# Patient Record
Sex: MALE | Race: WHITE | NOT HISPANIC OR LATINO | Employment: FULL TIME | URBAN - METROPOLITAN AREA
[De-identification: names, ages, dates, MRNs, and addresses within clinical notes are randomized per-mention and may not be internally consistent; named-entity substitution may affect disease eponyms.]

---

## 2017-01-27 ENCOUNTER — TELEPHONE (OUTPATIENT)
Dept: INTERNAL MEDICINE | Facility: CLINIC | Age: 62
End: 2017-01-27

## 2017-01-27 ENCOUNTER — OFFICE VISIT (OUTPATIENT)
Dept: INTERNAL MEDICINE | Facility: CLINIC | Age: 62
End: 2017-01-27

## 2017-01-27 VITALS
WEIGHT: 207 LBS | OXYGEN SATURATION: 98 % | HEART RATE: 80 BPM | SYSTOLIC BLOOD PRESSURE: 126 MMHG | DIASTOLIC BLOOD PRESSURE: 66 MMHG | BODY MASS INDEX: 28.07 KG/M2

## 2017-01-27 DIAGNOSIS — B02.9 HERPES ZOSTER WITHOUT COMPLICATION: Primary | ICD-10-CM

## 2017-01-27 PROCEDURE — 99213 OFFICE O/P EST LOW 20 MIN: CPT | Performed by: INTERNAL MEDICINE

## 2017-01-27 RX ORDER — LIDOCAINE 50 MG/G
2 PATCH TOPICAL EVERY 24 HOURS
Qty: 60 PATCH | Refills: 3 | Status: SHIPPED | OUTPATIENT
Start: 2017-01-27 | End: 2018-01-16

## 2017-01-27 RX ORDER — GABAPENTIN 300 MG/1
300 CAPSULE ORAL 3 TIMES DAILY
Qty: 90 CAPSULE | Refills: 1 | Status: SHIPPED | OUTPATIENT
Start: 2017-01-27 | End: 2018-01-16

## 2017-01-27 RX ORDER — VALACYCLOVIR HYDROCHLORIDE 1 G/1
1000 TABLET, FILM COATED ORAL 3 TIMES DAILY
Qty: 21 TABLET | Refills: 0 | Status: SHIPPED | OUTPATIENT
Start: 2017-01-27 | End: 2018-01-16

## 2017-01-27 NOTE — PROGRESS NOTES
Painful rash  History of Present Illness   Girish Greenberg is a 61 y.o. male presents for acute care. Painful rash. Started approx 1 week ago. No fever.     The following portions of the patient's history were reviewed and updated as appropriate: allergies, current medications, past family history, past medical history, past social history, past surgical history and problem list.  Current Outpatient Prescriptions on File Prior to Visit   Medication Sig Dispense Refill   • albuterol (PROVENTIL HFA;VENTOLIN HFA) 108 (90 BASE) MCG/ACT inhaler Inhale 2 puffs Every 6 (Six) Hours As Needed for shortness of air.     • budesonide-formoterol (SYMBICORT) 160-4.5 MCG/ACT inhaler Inhale 2 puffs Daily.     • thyroid (ARMOUR) 65 MG tablet Take 65 mg by mouth Daily.     • [DISCONTINUED] docusate sodium 100 MG capsule Take 100 mg by mouth 2 (Two) Times a Day As Needed for constipation. 40 capsule 0     No current facility-administered medications on file prior to visit.      Review of Systems   Constitutional: Negative.    HENT: Negative.    Respiratory: Negative.    Cardiovascular: Negative.    Skin: Positive for rash.   Neurological:        Painful neuropathic rash   Hematological: Negative.    Psychiatric/Behavioral: Negative.        Objective   Physical Exam   Constitutional: He is oriented to person, place, and time. He appears well-developed and well-nourished.   HENT:   Head: Normocephalic and atraumatic.   Right Ear: External ear normal.   Left Ear: External ear normal.   Nose: Nose normal.   Mouth/Throat: Oropharynx is clear and moist.   Eyes: Conjunctivae and EOM are normal. Pupils are equal, round, and reactive to light.   Cardiovascular: Normal rate, regular rhythm, normal heart sounds and intact distal pulses.    Pulmonary/Chest: Effort normal and breath sounds normal.   Abdominal: Soft. Bowel sounds are normal.   Musculoskeletal: Normal range of motion.   Neurological: He is alert and oriented to person, place, and time.    Skin: Rash noted.   Psychiatric: He has a normal mood and affect. His behavior is normal. Judgment and thought content normal.   Nursing note and vitals reviewed.       Visit Vitals   • /66   • Pulse 80   • Wt 207 lb (93.9 kg)   • SpO2 98%   • BMI 28.07 kg/m2       Assessment/Plan   Diagnoses and all orders for this visit:    Herpes zoster without complication    Other orders  -     valACYclovir (VALTREX) 1000 MG tablet; Take 1 tablet by mouth 3 (Three) Times a Day.  -     lidocaine (LIDODERM) 5 %; Place 2 patches on the skin Daily. Remove & Discard patch within 12 hours or as directed by MD    Patient w/ acute pain. Will try a lidoderm patch. Valtrex tid. If this does not improve symptoms will try gabapentin.

## 2017-01-27 NOTE — TELEPHONE ENCOUNTER
Pt needs a PA for lidocaine patches. i will get an answer before Monday. Do you want to change it?

## 2017-01-27 NOTE — MR AVS SNAPSHOT
Girish Greenberg   1/27/2017 9:00 AM   Office Visit    Dept Phone:  [phone]   Encounter #:  [id number]    Provider:  Regina Landis MD   Department:  [facility] INTERNAL MEDICINE                Your Full Care Plan              Today's Medication Changes          These changes are accurate as of: 1/27/17 10:13 AM.  If you have any questions, ask your nurse or doctor.               New Medication(s)Ordered:     lidocaine 5 %   Commonly known as:  LIDODERM   Place 2 patches on the skin Daily. Remove & Discard patch within 12 hours or as directed by MD       valACYclovir 1000 MG tablet   Commonly known as:  VALTREX   Take 1 tablet by mouth 3 (Three) Times a Day.         Stop taking medication(s)listed here:      MG capsule                Where to Get Your Medications      These medications were sent to [address] [address] [facility] [phone] [facility] [phone]   [address] [facility] [address]     Phone:  [phone]     lidocaine 5 %    valACYclovir 1000 MG tablet                  Your Updated Medication List          This list is accurate as of: 1/27/17 10:13 AM.  Always use your most recent med list.                albuterol 108 (90 BASE) MCG/ACT inhaler   Commonly known as:  PROVENTIL HFA;VENTOLIN HFA       budesonide-formoterol 160-4.5 MCG/ACT inhaler   Commonly known as:  SYMBICORT       lidocaine 5 %   Commonly known as:  LIDODERM   Place 2 patches on the skin Daily. Remove & Discard patch within 12 hours or as directed by MD       Thyroid 65 MG tablet   Commonly known as:  ARMOUR       valACYclovir 1000 MG tablet   Commonly known as:  VALTREX   Take 1 tablet by mouth 3 (Three) Times a Day.               You Were Diagnosed With        Codes Comments    Herpes zoster without complication    -  Primary ICD-10-CM: B02.9  ICD-9-CM: 053.9       Instructions     None    Patient Instructions History      Upcoming  "Appointments     Visit Type Date Time Department    OFFICE VISIT 2017  9:00 AM KELSEY BAKER JODYON      [employer] Signup     [facility] allows you to send messages to your doctor, view your test results, renew your prescriptions, schedule appointments, and more. To sign up, go to [email or web address] and click on the Sign Up Now link in the New User? box. Enter your [employer] Activation Code exactly as it appears below along with the last four digits of your Social Security Number and your Date of Birth () to complete the sign-up process. If you do not sign up before the expiration date, you must request a new code.    Bevii Activation Code: [email or web address]  Expires: 2/10/2017 10:13 AM    If you have questions, you can email [email or web address] or call [phone] to talk to our [employer] staff. Remember, [employer] is NOT to be used for urgent needs. For medical emergencies, dial 911.               Other Info from Your Visit           Allergies     Penicillins  Other (See Comments)    \"BLACKS OUT\"/ IN CHILDHOOD      Reason for Visit     Herpes Zoster           Vital Signs     Blood Pressure Pulse Weight Oxygen Saturation Body Mass Index Smoking Status    126/66 80 207 lb (93.9 kg) 98% 28.07 kg/m2 Never Smoker      Problems and Diagnoses Noted     Herpes zoster without complication        "

## 2017-05-01 ENCOUNTER — TELEPHONE (OUTPATIENT)
Dept: INTERNAL MEDICINE | Facility: CLINIC | Age: 62
End: 2017-05-01

## 2017-05-09 ENCOUNTER — OFFICE VISIT (OUTPATIENT)
Dept: INTERNAL MEDICINE | Facility: CLINIC | Age: 62
End: 2017-05-09

## 2017-05-09 VITALS
WEIGHT: 212 LBS | DIASTOLIC BLOOD PRESSURE: 62 MMHG | HEART RATE: 74 BPM | SYSTOLIC BLOOD PRESSURE: 110 MMHG | BODY MASS INDEX: 28.75 KG/M2 | OXYGEN SATURATION: 98 %
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
## 2017-05-09 DIAGNOSIS — M54.2 NECK PAIN: ICD-10-CM

## 2017-05-09 DIAGNOSIS — G44.52 NEW DAILY PERSISTENT HEADACHE: ICD-10-CM

## 2017-05-09 DIAGNOSIS — M25.842 CYST OF JOINT OF HAND, LEFT: ICD-10-CM

## 2017-05-09 PROCEDURE — 99214 OFFICE O/P EST MOD 30 MIN: CPT | Performed by: INTERNAL MEDICINE

## 2017-05-09 RX ORDER — MELOXICAM 15 MG/1
15 TABLET ORAL DAILY
Qty: 30 TABLET | Refills: 2 | Status: SHIPPED | OUTPATIENT
Start: 2017-05-09 | End: 2018-01-16

## 2017-05-09 RX ORDER — METAXALONE 800 MG/1
800 TABLET ORAL 3 TIMES DAILY PRN
Qty: 21 TABLET | Refills: 2 | OUTPATIENT
Start: 2017-05-09 | End: 2018-02-02

## 2017-05-09 RX ORDER — METHYLPREDNISOLONE 4 MG/1
TABLET ORAL
Qty: 21 TABLET | Refills: 0 | Status: SHIPPED | OUTPATIENT
Start: 2017-05-09 | End: 2018-01-16

## 2017-05-10 ENCOUNTER — APPOINTMENT (OUTPATIENT)
Dept: CT IMAGING | Facility: HOSPITAL | Age: 62
End: 2017-05-10

## 2017-05-24 ENCOUNTER — TREATMENT (OUTPATIENT)
Dept: PHYSICAL THERAPY | Facility: CLINIC | Age: 62
End: 2017-05-24

## 2017-05-24 ENCOUNTER — HOSPITAL ENCOUNTER (OUTPATIENT)
Dept: CT IMAGING | Facility: HOSPITAL | Age: 62
Discharge: HOME | End: 2017-05-24
Admitting: INTERNAL MEDICINE
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
## 2017-05-24 DIAGNOSIS — S46.911D SHOULDER STRAIN, RIGHT, SUBSEQUENT ENCOUNTER: ICD-10-CM
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
## 2017-05-24 DIAGNOSIS — S16.1XXD CERVICAL MUSCLE STRAIN, SUBSEQUENT ENCOUNTER: ICD-10-CM

## 2017-05-24 DIAGNOSIS — G44.52 NEW DAILY PERSISTENT HEADACHE: ICD-10-CM

## 2017-05-24 PROCEDURE — 97161 PT EVAL LOW COMPLEX 20 MIN: CPT | Performed by: PHYSICAL THERAPIST

## 2017-05-24 PROCEDURE — 97110 THERAPEUTIC EXERCISES: CPT | Performed by: PHYSICAL THERAPIST

## 2017-05-24 PROCEDURE — 70450 CT HEAD/BRAIN W/O DYE: CPT

## 2017-05-24 PROCEDURE — 97140 MANUAL THERAPY 1/> REGIONS: CPT | Performed by: PHYSICAL THERAPIST

## 2017-05-31 ENCOUNTER — TREATMENT (OUTPATIENT)
Dept: PHYSICAL THERAPY | Facility: CLINIC | Age: 62
End: 2017-05-31

## 2017-05-31 DIAGNOSIS — S46.911D SHOULDER STRAIN, RIGHT, SUBSEQUENT ENCOUNTER: ICD-10-CM
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
## 2017-05-31 DIAGNOSIS — S16.1XXD CERVICAL MUSCLE STRAIN, SUBSEQUENT ENCOUNTER: ICD-10-CM

## 2017-05-31 PROCEDURE — 97140 MANUAL THERAPY 1/> REGIONS: CPT | Performed by: PHYSICAL THERAPIST

## 2017-05-31 PROCEDURE — 97110 THERAPEUTIC EXERCISES: CPT | Performed by: PHYSICAL THERAPIST

## 2017-06-01 NOTE — PROGRESS NOTES
Physical Therapy Daily Progress Note        Subjective     Girish Greenberg reports: My neck and shoulder felt better for a few hours after IE. Still have headache daily. Right shoulder still moving better.  Objective   See Exercise, Manual, and Modality Logs for complete treatment.       Assessment/Plan  Decreased headache and neck pain after treatment. Improved right shoulder AROM but sore with ball flex up wall. Reinforced importance of good posture. Pt stated heat made headache worse.    Progress per Plan of Care  Add UBE next visit         Manual Therapy:  25       mins  06068;  Therapeutic Exercise: 15      mins  43316;     Neuromuscular Rito:       mins  86461;    Therapeutic Activity:         mins  06636;     Gait Training:         mins  62893;     Ultrasound:         mins  41407;    Electrical Stimulation:        mins  32100 ( );  Dry Needling         mins self-pay    Timed Treatment:   40   mins   Total Treatment:     55   mins    Rhiannon King, PT  Physical Therapist  [place] License # [id number]

## 2017-06-02 ENCOUNTER — TREATMENT (OUTPATIENT)
Dept: PHYSICAL THERAPY | Facility: CLINIC | Age: 62
End: 2017-06-02
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
## 2017-06-02 DIAGNOSIS — S16.1XXD CERVICAL MUSCLE STRAIN, SUBSEQUENT ENCOUNTER: ICD-10-CM

## 2017-06-02 DIAGNOSIS — S46.911D SHOULDER STRAIN, RIGHT, SUBSEQUENT ENCOUNTER: ICD-10-CM

## 2017-06-02 PROCEDURE — 97140 MANUAL THERAPY 1/> REGIONS: CPT | Performed by: PHYSICAL THERAPIST

## 2017-06-02 PROCEDURE — 97110 THERAPEUTIC EXERCISES: CPT | Performed by: PHYSICAL THERAPIST

## 2017-06-02 NOTE — PROGRESS NOTES
Physical Therapy Daily Progress Note        Subjective     Girish Greenberg reports: Have a headache again. 2-3 hours relief after last treatment. Less dizziness and right shoulder pain less    Objective   See Exercise, Manual, and Modality Logs for complete treatment.       Assessment/Plan  Fatigued quickly with Tband ER on the right. Headache less after manual treatment    Progress per Plan of Care           Manual Therapy:  35       mins  04423;  Therapeutic Exercise: 20      mins  23691;     Neuromuscular Rito:       mins  23350;    Therapeutic Activity:         mins  20707;     Gait Training:         mins  91122;     Ultrasound:         mins  86849;    Electrical Stimulation:        mins  89943 ( );  Dry Needling         mins self-pay    Timed Treatment:   55   mins   Total Treatment:     60   mins    Rhiannon King, PT  Physical Therapist  [place] License # [id number]

## 2017-06-05 ENCOUNTER — TREATMENT (OUTPATIENT)
Dept: PHYSICAL THERAPY | Facility: CLINIC | Age: 62
End: 2017-06-05

## 2017-06-05 DIAGNOSIS — S16.1XXD CERVICAL MUSCLE STRAIN, SUBSEQUENT ENCOUNTER: ICD-10-CM

## 2017-06-05 DIAGNOSIS — S46.911D SHOULDER STRAIN, RIGHT, SUBSEQUENT ENCOUNTER: ICD-10-CM
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
## 2017-06-05 PROCEDURE — 97110 THERAPEUTIC EXERCISES: CPT | Performed by: PHYSICAL THERAPIST

## 2017-06-05 PROCEDURE — 97140 MANUAL THERAPY 1/> REGIONS: CPT | Performed by: PHYSICAL THERAPIST

## 2017-06-05 NOTE — PROGRESS NOTES
Physical Therapy Daily Progress Note        Subjective     Girish Greenberg reports: Feeling some better. Longer periods of relief. Headaches a bit less. Shoulder moving and feeling better. Exercises helping.    Objective   See Exercise, Manual, and Modality Logs for complete treatment.       Assessment/Plan  Pt had increased headache after initial manual and exercise treatment but decreased once I did suboccipital release after there ex.    Progress per Plan of Care           Manual Therapy:  25       mins  79878;  Therapeutic Exercise: 20      mins  66180;     Neuromuscular Rito:       mins  89507;    Therapeutic Activity:         mins  78172;     Gait Training:         mins  57566;     Ultrasound:         mins  49128;    Electrical Stimulation:        mins  03146 ( );  Dry Needling         mins self-pay    Timed Treatment:   45   mins   Total Treatment:     50   mins    Rhiannon King, PT  Physical Therapist  [place] License # [id number]

## 2017-06-07 ENCOUNTER — TREATMENT (OUTPATIENT)
Dept: PHYSICAL THERAPY | Facility: CLINIC | Age: 62
End: 2017-06-07
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
## 2017-06-07 DIAGNOSIS — S46.911D SHOULDER STRAIN, RIGHT, SUBSEQUENT ENCOUNTER: ICD-10-CM

## 2017-06-07 DIAGNOSIS — S16.1XXD CERVICAL MUSCLE STRAIN, SUBSEQUENT ENCOUNTER: ICD-10-CM

## 2017-06-07 PROCEDURE — 97140 MANUAL THERAPY 1/> REGIONS: CPT | Performed by: PHYSICAL THERAPIST

## 2017-06-07 PROCEDURE — 97110 THERAPEUTIC EXERCISES: CPT | Performed by: PHYSICAL THERAPIST

## 2017-06-07 NOTE — PROGRESS NOTES
Physical Therapy Daily Progress Note        Subjective     Girish Greenberg reports: Dull headache but neck pain more central and shoulder feeling better.    Objective   See Exercise, Manual, and Modality Logs for complete treatment.       Assessment/Plan  Doing well with improved segmental mobility Cspine. Still sore endrange shoulder flexion. Headache better after treatment. Need to continue to work on upper cervical mob and STM. Ball on wall more painful with right rotation    Progress per Plan of Care           Manual Therapy:  25       mins  38631;  Therapeutic Exercise: 20      mins  01442;     Neuromuscular Rito:       mins  21474;    Therapeutic Activity:         mins  43211;     Gait Training:         mins  54588;     Ultrasound:         mins  13461;    Electrical Stimulation:        mins  92782 ( );  Dry Needling         mins self-pay    Timed Treatment:   45   mins   Total Treatment:     50   mins    Rhiannon King, PT  Physical Therapist  [place] License # [id number]

## 2017-06-12 ENCOUNTER — TREATMENT (OUTPATIENT)
Dept: PHYSICAL THERAPY | Facility: CLINIC | Age: 62
End: 2017-06-12

## 2017-06-12 DIAGNOSIS — S16.1XXD CERVICAL MUSCLE STRAIN, SUBSEQUENT ENCOUNTER: ICD-10-CM

## 2017-06-12 DIAGNOSIS — S46.911D SHOULDER STRAIN, RIGHT, SUBSEQUENT ENCOUNTER: ICD-10-CM
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
## 2017-06-12 PROCEDURE — 97140 MANUAL THERAPY 1/> REGIONS: CPT | Performed by: PHYSICAL THERAPIST

## 2017-06-12 PROCEDURE — 97110 THERAPEUTIC EXERCISES: CPT | Performed by: PHYSICAL THERAPIST

## 2017-06-12 NOTE — PROGRESS NOTES
Physical Therapy Daily Progress Note        Subjective     Girish Greenberg reports: I am waking up without headaches which is a plus. Neck a little sore 4/10 but headaches come on as day goes on. Shoulder better with increased reaching ability    Objective   See Exercise, Manual, and Modality Logs for complete treatment.       Assessment/Plan  Neck got sore after ball on wall rotations, decreased pain after manual and suboccipital release    Progress per Plan of Care   Add prone T, rows, mirror slides next visit           Manual Therapy:  25       mins  16972;  Therapeutic Exercise: 20      mins  96095;     Neuromuscular Rito:       mins  97260;    Therapeutic Activity:         mins  09328;     Gait Training:         mins  01554;     Ultrasound:         mins  11288;    Electrical Stimulation:        mins  91426 ( );  Dry Needling         mins self-pay    Timed Treatment:   45   mins   Total Treatment:     50   mins    Rhiannon King, PT  Physical Therapist  [place] License # [id number]

## 2017-06-15 ENCOUNTER — TREATMENT (OUTPATIENT)
Dept: PHYSICAL THERAPY | Facility: CLINIC | Age: 62
End: 2017-06-15

## 2017-06-15 DIAGNOSIS — S16.1XXD CERVICAL MUSCLE STRAIN, SUBSEQUENT ENCOUNTER: ICD-10-CM
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
## 2017-06-15 DIAGNOSIS — S46.911D SHOULDER STRAIN, RIGHT, SUBSEQUENT ENCOUNTER: ICD-10-CM

## 2017-06-15 PROCEDURE — 97110 THERAPEUTIC EXERCISES: CPT | Performed by: PHYSICAL THERAPIST

## 2017-06-15 PROCEDURE — 97140 MANUAL THERAPY 1/> REGIONS: CPT | Performed by: PHYSICAL THERAPIST

## 2017-06-15 NOTE — PROGRESS NOTES
Physical Therapy Daily Progress Note        Subjective     Girish Greenberg reports: Felt good Monday and Tuesday most of the day then got a headache. Overall less intense and frequent headaches    Objective   See Exercise, Manual, and Modality Logs for complete treatment.       Assessment/Plan  Full cervical PROM after treatment without pain. Headache mildly improved.Limited exercise secondary to pt arriving late    Progress per Plan of Care           Manual Therapy:  20        mins  82319;  Therapeutic Exercise: 12      mins  61622;     Neuromuscular Rito:       mins  97692;    Therapeutic Activity:         mins  61404;     Gait Training:         mins  39362;     Ultrasound:         mins  27014;    Electrical Stimulation:        mins  19505 ( );  Dry Needling         mins self-pay    Timed Treatment:   32   mins   Total Treatment:     35   mins    Rhiannon King, PT  Physical Therapist  [place] License # [id number]

## 2017-06-19 ENCOUNTER — TREATMENT (OUTPATIENT)
Dept: PHYSICAL THERAPY | Facility: CLINIC | Age: 62
End: 2017-06-19

## 2017-06-19 DIAGNOSIS — S16.1XXD CERVICAL MUSCLE STRAIN, SUBSEQUENT ENCOUNTER: ICD-10-CM

## 2017-06-19 DIAGNOSIS — S46.911D SHOULDER STRAIN, RIGHT, SUBSEQUENT ENCOUNTER: ICD-10-CM
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
## 2017-06-19 PROCEDURE — 97140 MANUAL THERAPY 1/> REGIONS: CPT | Performed by: PHYSICAL THERAPIST

## 2017-06-19 PROCEDURE — 97110 THERAPEUTIC EXERCISES: CPT | Performed by: PHYSICAL THERAPIST

## 2017-06-19 NOTE — PROGRESS NOTES
Physical Therapy Daily Progress Note        Subjective     Girish Greenberg reports: Headache better for 2 days after last treatment. Overall I would be good if i could just get rid of headaches. My neck is sore but I could live with that. Shoulder doing much better    Objective   See Exercise, Manual, and Modality Logs for complete treatment.       Assessment/Plan  Still some segmental restrictions left cspine as well as tightness suboccipitals. Chung Hammonds performed dry needling today as noted in treatment log to see if it helps headaches. Fatigued with 5# shrugs added today    Progress per Plan of Care           Manual Therapy:  20       mins  11094;  Therapeutic Exercise: 25      mins  05659;     Neuromuscular Rito:       mins  79614;    Therapeutic Activity:         mins  81682;     Gait Training:         mins  78049;     Ultrasound:         mins  53681;    Electrical Stimulation:        mins  81671 ( );  Dry Needling         mins self-pay    Timed Treatment:   45   mins   Total Treatment:     50   mins    Rhiannon King, PT  Physical Therapist  [place] License # [id number]

## 2017-06-21 ENCOUNTER — TREATMENT (OUTPATIENT)
Dept: PHYSICAL THERAPY | Facility: CLINIC | Age: 62
End: 2017-06-21

## 2017-06-21 DIAGNOSIS — S46.911D SHOULDER STRAIN, RIGHT, SUBSEQUENT ENCOUNTER: ICD-10-CM

## 2017-06-21 DIAGNOSIS — S16.1XXD CERVICAL MUSCLE STRAIN, SUBSEQUENT ENCOUNTER: ICD-10-CM
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
## 2017-06-21 PROCEDURE — 97140 MANUAL THERAPY 1/> REGIONS: CPT | Performed by: PHYSICAL THERAPIST

## 2017-06-21 PROCEDURE — 97110 THERAPEUTIC EXERCISES: CPT | Performed by: PHYSICAL THERAPIST

## 2017-06-21 NOTE — PROGRESS NOTES
Physical Therapy Daily Progress Note        Subjective     Girish Greenberg reports: Headache last night and this morning. I don't think dry needling helped. The manual therapy works better. Pleased with right shoulder    Objective   See Exercise, Manual, and Modality Logs for complete treatment.       Assessment/Plan  Sore right lower Cspine and fatigued right shoulder after yellow medicine ball supine overhead. Headache slightly better immediately after OA release    Progress per Plan of Care           Manual Therapy:  20       mins  63758;  Therapeutic Exercise: 25      mins  71061;     Neuromuscular Rito:       mins  96195;    Therapeutic Activity:         mins  04159;     Gait Training:         mins  74319;     Ultrasound:         mins  13573;    Electrical Stimulation:        mins  80998 ( );  Dry Needling         mins self-pay    Timed Treatment:   40   mins   Total Treatment:     45   mins    Rhiannon King PT  Physical Therapist  [place] License # [id number]

## 2017-06-26 ENCOUNTER — TREATMENT (OUTPATIENT)
Dept: PHYSICAL THERAPY | Facility: CLINIC | Age: 62
End: 2017-06-26
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
## 2017-06-26 DIAGNOSIS — S46.911D SHOULDER STRAIN, RIGHT, SUBSEQUENT ENCOUNTER: ICD-10-CM

## 2017-06-26 DIAGNOSIS — S16.1XXD CERVICAL MUSCLE STRAIN, SUBSEQUENT ENCOUNTER: ICD-10-CM

## 2017-06-26 PROCEDURE — 97140 MANUAL THERAPY 1/> REGIONS: CPT | Performed by: PHYSICAL THERAPIST

## 2017-06-26 PROCEDURE — 97110 THERAPEUTIC EXERCISES: CPT | Performed by: PHYSICAL THERAPIST
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
## 2017-06-28 ENCOUNTER — TREATMENT (OUTPATIENT)
Dept: PHYSICAL THERAPY | Facility: CLINIC | Age: 62
End: 2017-06-28

## 2017-06-28 DIAGNOSIS — S46.911D SHOULDER STRAIN, RIGHT, SUBSEQUENT ENCOUNTER: ICD-10-CM

## 2017-06-28 DIAGNOSIS — S16.1XXD CERVICAL MUSCLE STRAIN, SUBSEQUENT ENCOUNTER: ICD-10-CM
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
## 2017-06-28 PROCEDURE — 97140 MANUAL THERAPY 1/> REGIONS: CPT | Performed by: PHYSICAL THERAPIST

## 2017-06-28 NOTE — PROGRESS NOTES
Physical Therapy Daily Progress Note        Subjective     Girish Greenberg reports: Headache today about 6/10. It has been bad past few days but I am also working harder.    Objective   Tender L OA  L C3-4, C4-5  R T1-2 and upper trap  See Exercise, Manual, and Modality Logs for complete treatment.       Assessment/Plan  No exercisee today due to headache. Ice initially made it worse but then relieved headache. Instructed pt to use ice after work, kevin since he doesn't like to take NSAIDs/meds.     Progress per Plan of Care           Manual Therapy:  35       mins  17887;  Therapeutic Exercise:       mins  99343;     Neuromuscular Rito:       mins  05310;    Therapeutic Activity:         mins  41420;     Gait Training:         mins  29211;     Ultrasound:         mins  85147;    Electrical Stimulation:        mins  14514 ( );  Dry Needling         mins self-pay    Timed Treatment:   35   mins   Total Treatment:     50   mins    Rhiannon King, PT  Physical Therapist  [place] License # [id number]

## 2017-07-05 ENCOUNTER — TREATMENT (OUTPATIENT)
Dept: PHYSICAL THERAPY | Facility: CLINIC | Age: 62
End: 2017-07-05
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
## 2017-07-05 DIAGNOSIS — S16.1XXD CERVICAL MUSCLE STRAIN, SUBSEQUENT ENCOUNTER: ICD-10-CM

## 2017-07-05 PROCEDURE — 97110 THERAPEUTIC EXERCISES: CPT | Performed by: PHYSICAL THERAPIST

## 2017-07-05 PROCEDURE — 97140 MANUAL THERAPY 1/> REGIONS: CPT | Performed by: PHYSICAL THERAPIST

## 2017-07-05 NOTE — PROGRESS NOTES
Physical Therapy Daily Progress Note        Subjective     Girish Greenberg reports: I am better but feel like I have hit a plateau. Still having headaches, not as intense but still won't go away. My right shoulder blade feels very stiff.    Objective   See Exercise, Manual, and Modality Logs for complete treatment.       Assessment/Plan  Much improved symptoms with less headache after manual treatment. Especially restricted at L TMJ and temple. Left C1 stuck left    Progress per Plan of Care           Manual Therapy:  40       mins  59277;  Therapeutic Exercise: 10    mins  59704;     Neuromuscular Rito:       mins  98717;    Therapeutic Activity:         mins  06970;     Gait Training:         mins  28709;     Ultrasound:         mins  77740;    Electrical Stimulation:        mins  65123 ( );  Dry Needling         mins self-pay    Timed Treatment:  50 mins   Total Treatment:     52   mins    Rhiannon King PT  Physical Therapist  [place] License # [id number]

## 2017-07-07 ENCOUNTER — TREATMENT (OUTPATIENT)
Dept: PHYSICAL THERAPY | Facility: CLINIC | Age: 62
End: 2017-07-07

## 2017-07-07 DIAGNOSIS — S16.1XXD CERVICAL MUSCLE STRAIN, SUBSEQUENT ENCOUNTER: ICD-10-CM

## 2017-07-07 DIAGNOSIS — S46.911D SHOULDER STRAIN, RIGHT, SUBSEQUENT ENCOUNTER: ICD-10-CM
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
## 2017-07-07 PROCEDURE — 97140 MANUAL THERAPY 1/> REGIONS: CPT | Performed by: PHYSICAL THERAPIST

## 2017-07-07 PROCEDURE — 97110 THERAPEUTIC EXERCISES: CPT | Performed by: PHYSICAL THERAPIST

## 2017-07-07 NOTE — PROGRESS NOTES
Physical Therapy Daily Progress Note        Subjective     Girish Greenberg reports: Sore in left jaw and neck after last tretament. Headache was better Wednesday but woke up at 3 am last night and had to take medicine    Objective   Pain R T1-2 with both left and right rotation.   No pain with flexion    See Exercise, Manual, and Modality Logs for complete treatment.       Assessment/Plan  No pain with B rotation after manual treatment/MWM to T1-2. HEadache less as well after manual treatment.     Progress per Plan of Care           Manual Therapy:  30       mins  43721;  Therapeutic Exercise: 10      mins  82340;     Neuromuscular Rito:       mins  46356;    Therapeutic Activity:         mins  23994;     Gait Training:         mins  49500;     Ultrasound:         mins  57886;    Electrical Stimulation:        mins  47395 ( );  Dry Needling         mins self-pay    Timed Treatment:   40   mins   Total Treatment:     45   mins    Rhiannon King, PT  Physical Therapist  [place] License # [id number]

## 2017-07-10 ENCOUNTER — TREATMENT (OUTPATIENT)
Dept: PHYSICAL THERAPY | Facility: CLINIC | Age: 62
End: 2017-07-10

## 2017-07-10 DIAGNOSIS — S46.911D SHOULDER STRAIN, RIGHT, SUBSEQUENT ENCOUNTER: ICD-10-CM

## 2017-07-10 DIAGNOSIS — S16.1XXD CERVICAL MUSCLE STRAIN, SUBSEQUENT ENCOUNTER: ICD-10-CM
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
## 2017-07-10 PROCEDURE — 97140 MANUAL THERAPY 1/> REGIONS: CPT | Performed by: PHYSICAL THERAPIST

## 2017-07-10 PROCEDURE — 97110 THERAPEUTIC EXERCISES: CPT | Performed by: PHYSICAL THERAPIST

## 2017-07-10 NOTE — PROGRESS NOTES
Physical Therapy Daily Progress Note        Subjective     Girish Greenberg reports: My headaches were not as bad over the weekend. Did some yardwork Sunday and the heat and exertion made my neck more sore on left side. Also sore in upper middle back.    Objective   See Exercise, Manual, and Modality Logs for complete treatment.       Assessment/Plan  Headache a bit aggravated with UBE but decreased after manual therapy. Also added Tband horizontal abduction to HEP    Progress per Plan of Care           Manual Therapy:  25       mins  80492;  Therapeutic Exercise: 20      mins  19075;     Neuromuscular Rito:       mins  95151;    Therapeutic Activity:         mins  62831;     Gait Training:         mins  09226;     Ultrasound:         mins  99875;    Electrical Stimulation:        mins  67339 ( );  Dry Needling         mins self-pay    Timed Treatment:   45   mins   Total Treatment:     50   mins    Rhiannon King PT  Physical Therapist  [place] License # [id number]

## 2017-07-17 ENCOUNTER — TREATMENT (OUTPATIENT)
Dept: PHYSICAL THERAPY | Facility: CLINIC | Age: 62
End: 2017-07-17

## 2017-07-17 DIAGNOSIS — S46.911D SHOULDER STRAIN, RIGHT, SUBSEQUENT ENCOUNTER: ICD-10-CM

## 2017-07-17 DIAGNOSIS — S16.1XXD CERVICAL MUSCLE STRAIN, SUBSEQUENT ENCOUNTER: ICD-10-CM
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
## 2017-07-17 PROCEDURE — 97140 MANUAL THERAPY 1/> REGIONS: CPT | Performed by: PHYSICAL THERAPIST

## 2017-07-17 NOTE — PROGRESS NOTES
Physical Therapy Daily Progress Note    15 visits    Subjective     Girish Greenberg reports: Pain left neck and right shoulder over the weekend. I didn't change any activity level. Not sure why it's hurting. Headaches not as bad. I need to be a bit better with my HEP. I fixed my glasses so I have right prescription now in case old glasses were making headaches worse.    Objective   See Exercise, Manual, and Modality Logs for complete treatment.       Assessment/Plan  Discussed importance of HEP and posture. Pt was sitting in waiting room with spine in flexed position. Stated he was trying to just get comfortable with his low back.     Progress per Plan of Care   Instructed pt to follow up with MD concerning continued headaches           Manual Therapy:  30      mins  40464;  Therapeutic Exercise: 5      mins  18341;     Neuromuscular Rito:       mins  01314;    Therapeutic Activity:         mins  87731;     Gait Training:         mins  54791;     Ultrasound:         mins  86667;    Electrical Stimulation:        mins  22535 ( );  Dry Needling         mins self-pay    Timed Treatment:   35   mins   Total Treatment:     40   mins    Rhiannon King, PT  Physical Therapist  [place] License # [id number]

## 2017-07-28 ENCOUNTER — TREATMENT (OUTPATIENT)
Dept: PHYSICAL THERAPY | Facility: CLINIC | Age: 62
End: 2017-07-28

## 2017-07-28 DIAGNOSIS — S16.1XXD CERVICAL MUSCLE STRAIN, SUBSEQUENT ENCOUNTER: ICD-10-CM
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
## 2017-07-28 DIAGNOSIS — S46.911D SHOULDER STRAIN, RIGHT, SUBSEQUENT ENCOUNTER: ICD-10-CM

## 2017-07-28 PROCEDURE — 97110 THERAPEUTIC EXERCISES: CPT | Performed by: PHYSICAL THERAPIST

## 2017-07-28 PROCEDURE — 97140 MANUAL THERAPY 1/> REGIONS: CPT | Performed by: PHYSICAL THERAPIST

## 2017-07-28 NOTE — PROGRESS NOTES
Physical Therapy Daily Progress Note        Subjective     Girish Greenberg reports: Headache past 3 days. Neck and upper back stiff. Overall better but tired of headaches. Has not seen MD     Objective   See Exercise, Manual, and Modality Logs for complete treatment.       Assessment/Plan  Some segmental restriction lower right and mid left. Decreased pain and headache after manual tretament. Needed reminder for Tband ER and hor abduction. Fatigue in right shoulder    Progress per Plan of Care           Manual Therapy:  30       mins  03539;  Therapeutic Exercise: 15      mins  01269;     Neuromuscular Rito:       mins  88510;    Therapeutic Activity:         mins  29082;     Gait Training:         mins  68019;     Ultrasound:         mins  88054;    Electrical Stimulation:        mins  69551 ( );  Dry Needling         mins self-pay    Timed Treatment:   45   mins   Total Treatment:     50   mins    Rhiannon King, PT  Physical Therapist  [place] License # [id number]

## 2017-07-31 ENCOUNTER — TREATMENT (OUTPATIENT)
Dept: PHYSICAL THERAPY | Facility: CLINIC | Age: 62
End: 2017-07-31
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
## 2017-07-31 DIAGNOSIS — S46.911D SHOULDER STRAIN, RIGHT, SUBSEQUENT ENCOUNTER: ICD-10-CM

## 2017-07-31 DIAGNOSIS — S16.1XXD CERVICAL MUSCLE STRAIN, SUBSEQUENT ENCOUNTER: ICD-10-CM

## 2017-07-31 PROCEDURE — 97140 MANUAL THERAPY 1/> REGIONS: CPT | Performed by: PHYSICAL THERAPIST

## 2017-07-31 PROCEDURE — 97110 THERAPEUTIC EXERCISES: CPT | Performed by: PHYSICAL THERAPIST

## 2017-08-01 NOTE — PROGRESS NOTES
Physical Therapy Daily Progress Note        Subjective     Girish Greenberg reports: My upper middle back sore. Headache not as bad today and overall better with less intensity    Objective     See Exercise, Manual, and Modality Logs for complete treatment.       Assessment/Plan  Needed verbal and tactile cues for posture while doing new exercises for cervical stabilization standing.    Progress per Plan of Care           Manual Therapy:  35       mins  19152;  Therapeutic Exercise: 20      mins  27342;     Neuromuscular Rito:       mins  63862;    Therapeutic Activity:         mins  34984;     Gait Training:         mins  64351;     Ultrasound:         mins  18989;    Electrical Stimulation:        mins  05339 ( );  Dry Needling         mins self-pay    Timed Treatment:   55   mins   Total Treatment:     55   mins    Rhiannon King, PT  Physical Therapist  [place] License # [id number]

## 2017-08-02 ENCOUNTER — TREATMENT (OUTPATIENT)
Dept: PHYSICAL THERAPY | Facility: CLINIC | Age: 62
End: 2017-08-02

## 2017-08-02 DIAGNOSIS — S16.1XXD CERVICAL MUSCLE STRAIN, SUBSEQUENT ENCOUNTER: ICD-10-CM

## 2017-08-02 DIAGNOSIS — S46.911D SHOULDER STRAIN, RIGHT, SUBSEQUENT ENCOUNTER: ICD-10-CM
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
## 2017-08-02 PROCEDURE — 97110 THERAPEUTIC EXERCISES: CPT | Performed by: PHYSICAL THERAPIST

## 2017-08-02 PROCEDURE — 97140 MANUAL THERAPY 1/> REGIONS: CPT | Performed by: PHYSICAL THERAPIST

## 2017-08-02 NOTE — PROGRESS NOTES
Physical Therapy Daily Progress Note        Subjective     Girish Greenberg reports: Last treatment helped, headaches seem to finally be less intense for longer periods. Upper back and lower neck the sorest.  Objective   See Exercise, Manual, and Modality Logs for complete treatment.       Assessment/Plan  Full PROM Cspine. Needs to continue to work on posture and thoracic extension mobility    Progress strengthening /stabilization /functional activity           Manual Therapy:  20       mins  52534;  Therapeutic Exercise: 35      mins  52222;     Neuromuscular Rito:       mins  73995;    Therapeutic Activity:         mins  18145;     Gait Training:         mins  67295;     Ultrasound:         mins  10637;    Electrical Stimulation:        mins  47663 ( );  Dry Needling         mins self-pay    Timed Treatment:   55   mins   Total Treatment:     60   mins    Rhiannon King, PT  Physical Therapist  [place] License # [id number]

## 2017-08-14 ENCOUNTER — TREATMENT (OUTPATIENT)
Dept: PHYSICAL THERAPY | Facility: CLINIC | Age: 62
End: 2017-08-14

## 2017-08-14 DIAGNOSIS — S16.1XXD CERVICAL MUSCLE STRAIN, SUBSEQUENT ENCOUNTER: ICD-10-CM

## 2017-08-14 DIAGNOSIS — S46.911D SHOULDER STRAIN, RIGHT, SUBSEQUENT ENCOUNTER: ICD-10-CM
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
## 2017-08-14 PROCEDURE — 97110 THERAPEUTIC EXERCISES: CPT | Performed by: PHYSICAL THERAPIST

## 2017-08-14 PROCEDURE — 97140 MANUAL THERAPY 1/> REGIONS: CPT | Performed by: PHYSICAL THERAPIST

## 2017-08-14 NOTE — PROGRESS NOTES
Physical Therapy Daily Progress Note        Subjective     Girish Greenberg reports: Lower neck and upper trap hurt. Headaches not as bad. Stiff in morning but loosens up.    Objective   See Exercise, Manual, and Modality Logs for complete treatment.       Assessment/Plan  Feels better after treatment. Minimal headache. Needs continued verbal and tactile reminders for posture.     Progress strengthening /stabilization /functional activity           Manual Therapy:  25       mins  46856;  Therapeutic Exercise: 15      mins  85186;     Neuromuscular Rito:       mins  48584;    Therapeutic Activity:         mins  22216;     Gait Training:         mins  16958;     Ultrasound:         mins  23993;    Electrical Stimulation:        mins  75475 ( );  Dry Needling         mins self-pay    Timed Treatment:   40   mins   Total Treatment:     45   mins    Rhiannon King, PT  Physical Therapist  [place] License # [id number]

## 2017-08-21 ENCOUNTER — TREATMENT (OUTPATIENT)
Dept: PHYSICAL THERAPY | Facility: CLINIC | Age: 62
End: 2017-08-21

## 2017-08-21 DIAGNOSIS — S16.1XXD CERVICAL MUSCLE STRAIN, SUBSEQUENT ENCOUNTER: ICD-10-CM

## 2017-08-21 DIAGNOSIS — S46.911D SHOULDER STRAIN, RIGHT, SUBSEQUENT ENCOUNTER: ICD-10-CM
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
## 2017-08-21 PROCEDURE — 97110 THERAPEUTIC EXERCISES: CPT | Performed by: PHYSICAL THERAPIST

## 2017-08-21 PROCEDURE — 97140 MANUAL THERAPY 1/> REGIONS: CPT | Performed by: PHYSICAL THERAPIST
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
## 2017-08-23 ENCOUNTER — TREATMENT (OUTPATIENT)
Dept: PHYSICAL THERAPY | Facility: CLINIC | Age: 62
End: 2017-08-23
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
## 2017-08-23 DIAGNOSIS — S46.911D SHOULDER STRAIN, RIGHT, SUBSEQUENT ENCOUNTER: ICD-10-CM

## 2017-08-23 DIAGNOSIS — S16.1XXD CERVICAL MUSCLE STRAIN, SUBSEQUENT ENCOUNTER: ICD-10-CM

## 2017-08-23 PROCEDURE — 97110 THERAPEUTIC EXERCISES: CPT | Performed by: PHYSICAL THERAPIST

## 2017-08-23 PROCEDURE — 97035 APP MDLTY 1+ULTRASOUND EA 15: CPT | Performed by: PHYSICAL THERAPIST

## 2017-08-23 PROCEDURE — 97140 MANUAL THERAPY 1/> REGIONS: CPT | Performed by: PHYSICAL THERAPIST

## 2017-08-23 NOTE — PROGRESS NOTES
Physical Therapy Daily Progress Note        Subjective     Girish Greenberg reports: Feeling a little better. SHoulder some better. HEadaches minimal now. PLeased with that  Objective   See Exercise, Manual, and Modality Logs for complete treatment.       Assessment/Plan  Much improved shoulder mobility with less pain. Abduction most painful.    Progress strengthening /stabilization /functional activity           Manual Therapy:  30       mins  46259;  Therapeutic Exercise: 10      mins  81149;     Neuromuscular Rito:       mins  42537;    Therapeutic Activity:         mins  52551;     Gait Training:         mins  04709;     Ultrasound:   7      mins  04550;    Electrical Stimulation:        mins  74702 ( );  Dry Needling         mins self-pay    Timed Treatment:   47   mins   Total Treatment:     50   mins    Rhiannon King, PT  Physical Therapist  [place] License # [id number]

## 2017-08-28 ENCOUNTER — TREATMENT (OUTPATIENT)
Dept: PHYSICAL THERAPY | Facility: CLINIC | Age: 62
End: 2017-08-28

## 2017-08-28 DIAGNOSIS — S16.1XXD CERVICAL MUSCLE STRAIN, SUBSEQUENT ENCOUNTER: ICD-10-CM
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
## 2017-08-28 DIAGNOSIS — S46.911D SHOULDER STRAIN, RIGHT, SUBSEQUENT ENCOUNTER: ICD-10-CM

## 2017-08-28 PROCEDURE — 97035 APP MDLTY 1+ULTRASOUND EA 15: CPT | Performed by: PHYSICAL THERAPIST

## 2017-08-28 PROCEDURE — 97140 MANUAL THERAPY 1/> REGIONS: CPT | Performed by: PHYSICAL THERAPIST

## 2017-08-28 PROCEDURE — 97110 THERAPEUTIC EXERCISES: CPT | Performed by: PHYSICAL THERAPIST

## 2017-08-29 NOTE — PROGRESS NOTES
Physical Therapy Daily Progress Note        Subjective     Girish Greenberg reports: Right shoulder and right neck sore today. Still hurts to reach and/or lift overhead    Objective   See Exercise, Manual, and Modality Logs for complete treatment.       Assessment/Plan  Much improved shoulder PROM(ER to 90 without pain; full flexion with min soreness) and less pain after Grade 4 posterior glide to GH joint. Instructed pt's wife in performing posterior glide to shoulder.    Progress per Plan of Care           Manual Therapy:  25       mins  92245;  Therapeutic Exercise: 15      mins  78130;     Neuromuscular Rito:       mins  68194;    Therapeutic Activity:         mins  70739;     Gait Training:         mins  60290;     Ultrasound:   8      mins  63039;    Electrical Stimulation:        mins  64531 ( );  Dry Needling         mins self-pay    Timed Treatment:   48   mins   Total Treatment:     50   mins    Rhiannon King, PT  Physical Therapist  [place] License # [id number]

## 2017-09-07 ENCOUNTER — TREATMENT (OUTPATIENT)
Dept: PHYSICAL THERAPY | Facility: CLINIC | Age: 62
End: 2017-09-07

## 2017-09-07 DIAGNOSIS — S46.911D SHOULDER STRAIN, RIGHT, SUBSEQUENT ENCOUNTER: ICD-10-CM

## 2017-09-07 DIAGNOSIS — S16.1XXD CERVICAL MUSCLE STRAIN, SUBSEQUENT ENCOUNTER: ICD-10-CM
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
## 2017-09-07 PROCEDURE — 97110 THERAPEUTIC EXERCISES: CPT | Performed by: PHYSICAL THERAPIST

## 2017-09-07 PROCEDURE — 97140 MANUAL THERAPY 1/> REGIONS: CPT | Performed by: PHYSICAL THERAPIST

## 2017-09-07 NOTE — PROGRESS NOTES
Physical Therapy Daily Progress Note        Subjective     Girish Greenberg reports: Headaches are better overall. Very dull every day. My right shoulder felt great after last treatment. My wife tried to do posterior glide stretch like you did but it made it feel worse so she stopped. Middle back sore lately, more on left.    Objective   See Exercise, Manual, and Modality Logs for complete treatment.       Assessment/Plan  Tender left Cspine at C4-6. Full PROM right shoulder today with less pain. Encouraged proper posture and Tband ER strengthening at home.    Progress per Plan of Care   1x/week for 3-4 more weeks. Progressing towards DC           Manual Therapy:  30       mins  97311;  Therapeutic Exercise: 15      mins  30299;     Neuromuscular Rito:       mins  15315;    Therapeutic Activity:         mins  76054;     Gait Training:         mins  07515;     Ultrasound:         mins  32959;    Electrical Stimulation:        mins  52116 ( );  Dry Needling         mins self-pay    Timed Treatment:   45   mins   Total Treatment:     50   mins    Rhiannon King PT  Physical Therapist  [place] License # [id number]

## 2017-09-13 ENCOUNTER — TREATMENT (OUTPATIENT)
Dept: PHYSICAL THERAPY | Facility: CLINIC | Age: 62
End: 2017-09-13

## 2017-09-13 DIAGNOSIS — S46.911D SHOULDER STRAIN, RIGHT, SUBSEQUENT ENCOUNTER: ICD-10-CM

## 2017-09-13 DIAGNOSIS — S16.1XXD CERVICAL MUSCLE STRAIN, SUBSEQUENT ENCOUNTER: ICD-10-CM
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
## 2017-09-13 PROCEDURE — 97140 MANUAL THERAPY 1/> REGIONS: CPT | Performed by: PHYSICAL THERAPIST

## 2017-09-13 PROCEDURE — 97110 THERAPEUTIC EXERCISES: CPT | Performed by: PHYSICAL THERAPIST

## 2017-09-13 NOTE — PROGRESS NOTES
Physical Therapy Daily Progress Note        Subjective     Girish Greenberg reports: I would be happy if my shoulder was better. My headaches now are very dull and manageable.    Objective   Pain with active abduction > 90  See Exercise, Manual, and Modality Logs for complete treatment.       Assessment/Plan  Improved abduction to about 140 after treatment with less pain. Encouraged good posture and HEP for RTC and scap strengthening    Progress per Plan of Care   1x/week           Manual Therapy:  25       mins  81449;  Therapeutic Exercise: 15      mins  97310;     Neuromuscular Rito:       mins  89586;    Therapeutic Activity:         mins  52166;     Gait Training:         mins  41533;     Ultrasound:   8      mins  91677;    Electrical Stimulation:        mins  79693 ( );  Dry Needling         mins self-pay    Timed Treatment:   48   mins   Total Treatment:     50   mins    Rhiannon King PT  Physical Therapist  [place] License # [id number]

## 2017-09-18 ENCOUNTER — TREATMENT (OUTPATIENT)
Dept: PHYSICAL THERAPY | Facility: CLINIC | Age: 62
End: 2017-09-18

## 2017-09-18 DIAGNOSIS — S46.911D SHOULDER STRAIN, RIGHT, SUBSEQUENT ENCOUNTER: ICD-10-CM

## 2017-09-18 DIAGNOSIS — S16.1XXD CERVICAL MUSCLE STRAIN, SUBSEQUENT ENCOUNTER: ICD-10-CM
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
## 2017-09-18 PROCEDURE — 97110 THERAPEUTIC EXERCISES: CPT | Performed by: PHYSICAL THERAPIST

## 2017-09-18 PROCEDURE — 97140 MANUAL THERAPY 1/> REGIONS: CPT | Performed by: PHYSICAL THERAPIST

## 2017-09-18 PROCEDURE — 97035 APP MDLTY 1+ULTRASOUND EA 15: CPT | Performed by: PHYSICAL THERAPIST

## 2017-09-18 NOTE — PROGRESS NOTES
Physical Therapy Daily Progress Note        Subjective     Girish Greenberg reports: I feel like I am 80% better. Headaches very dull and shoulder feeling better    Objective   See Exercise, Manual, and Modality Logs for complete treatment.       Assessment/Plan  Very minimal soreness endrange flexion passively. IR/ER WNL without pain    Progress per Plan of Care and Anticipate DC next 2 weeks           Manual Therapy:  25       mins  84169;  Therapeutic Exercise: 20      mins  92772;     Neuromuscular Rito:       mins  08007;    Therapeutic Activity:         mins  25155;     Gait Training:         mins  57640;     Ultrasound:   8      mins  93655;    Electrical Stimulation:        mins  14968 ( );  Dry Needling         mins self-pay    Timed Treatment:   53   mins   Total Treatment:     55   mins    Rhiannon King, PT  Physical Therapist  [place] License # [id number]

## 2017-09-25 ENCOUNTER — TREATMENT (OUTPATIENT)
Dept: PHYSICAL THERAPY | Facility: CLINIC | Age: 62
End: 2017-09-25

## 2017-09-25 DIAGNOSIS — S46.911D SHOULDER STRAIN, RIGHT, SUBSEQUENT ENCOUNTER: ICD-10-CM
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
## 2017-09-25 DIAGNOSIS — S16.1XXD CERVICAL MUSCLE STRAIN, SUBSEQUENT ENCOUNTER: ICD-10-CM

## 2017-09-25 PROCEDURE — 97110 THERAPEUTIC EXERCISES: CPT | Performed by: PHYSICAL THERAPIST

## 2017-09-25 PROCEDURE — 97140 MANUAL THERAPY 1/> REGIONS: CPT | Performed by: PHYSICAL THERAPIST
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
## 2017-10-03 ENCOUNTER — TREATMENT (OUTPATIENT)
Dept: PHYSICAL THERAPY | Facility: CLINIC | Age: 62
End: 2017-10-03

## 2017-10-03 DIAGNOSIS — S16.1XXD CERVICAL MUSCLE STRAIN, SUBSEQUENT ENCOUNTER: ICD-10-CM

## 2017-10-03 DIAGNOSIS — S46.911D SHOULDER STRAIN, RIGHT, SUBSEQUENT ENCOUNTER: ICD-10-CM
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
## 2017-10-03 PROCEDURE — 97140 MANUAL THERAPY 1/> REGIONS: CPT | Performed by: PHYSICAL THERAPIST

## 2017-10-03 PROCEDURE — 97110 THERAPEUTIC EXERCISES: CPT | Performed by: PHYSICAL THERAPIST
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
## 2018-01-05 ENCOUNTER — TRANSCRIBE ORDERS (OUTPATIENT)
Dept: ADMINISTRATIVE | Facility: HOSPITAL | Age: 63
End: 2018-01-05

## 2018-01-05 DIAGNOSIS — C61 PROSTATE CANCER (HCC): Primary | ICD-10-CM

## 2018-01-11 ENCOUNTER — HOSPITAL ENCOUNTER (OUTPATIENT)
Dept: PET IMAGING | Facility: HOSPITAL | Age: 63
End: 2018-01-11

## 2018-01-14 ENCOUNTER — APPOINTMENT (OUTPATIENT)
Dept: GENERAL RADIOLOGY | Facility: HOSPITAL | Age: 63
End: 2018-01-14

## 2018-01-14 ENCOUNTER — HOSPITAL ENCOUNTER (EMERGENCY)
Facility: HOSPITAL | Age: 63
Discharge: HOME | End: 2018-01-14
Attending: EMERGENCY MEDICINE | Admitting: EMERGENCY MEDICINE

## 2018-01-14 VITALS
HEART RATE: 80 BPM | DIASTOLIC BLOOD PRESSURE: 85 MMHG | RESPIRATION RATE: 18 BRPM | SYSTOLIC BLOOD PRESSURE: 136 MMHG | TEMPERATURE: 97.7 F | OXYGEN SATURATION: 98 % | WEIGHT: 208 LBS | BODY MASS INDEX: 28.17 KG/M2 | HEIGHT: 72 IN

## 2018-01-14 DIAGNOSIS — S40.012A CONTUSION OF LEFT SHOULDER, INITIAL ENCOUNTER: ICD-10-CM

## 2018-01-14 DIAGNOSIS — S20.212A CHEST WALL CONTUSION, LEFT, INITIAL ENCOUNTER: Primary | ICD-10-CM

## 2018-01-14 PROCEDURE — 93005 ELECTROCARDIOGRAM TRACING: CPT

## 2018-01-14 PROCEDURE — 93010 ELECTROCARDIOGRAM REPORT: CPT | Performed by: INTERNAL MEDICINE

## 2018-01-14 PROCEDURE — 99284 EMERGENCY DEPT VISIT MOD MDM: CPT

## 2018-01-14 PROCEDURE — 73030 X-RAY EXAM OF SHOULDER: CPT

## 2018-01-14 PROCEDURE — 71101 X-RAY EXAM UNILAT RIBS/CHEST: CPT

## 2018-01-14 RX ORDER — HYDROCODONE BITARTRATE AND ACETAMINOPHEN 7.5; 325 MG/1; MG/1
1 TABLET ORAL ONCE
Status: COMPLETED | OUTPATIENT
Start: 2018-01-14 | End: 2018-01-14

## 2018-01-14 RX ORDER — HYDROCODONE BITARTRATE AND ACETAMINOPHEN 5; 325 MG/1; MG/1
1 TABLET ORAL EVERY 6 HOURS PRN
Qty: 12 TABLET | Refills: 0 | Status: SHIPPED | OUTPATIENT
Start: 2018-01-14 | End: 2018-05-08 | Stop reason: SDUPTHER

## 2018-01-14 RX ORDER — BACLOFEN 10 MG/1
10 TABLET ORAL ONCE
Status: COMPLETED | OUTPATIENT
Start: 2018-01-14 | End: 2018-01-14

## 2018-01-14 RX ORDER — METAXALONE 800 MG/1
800 TABLET ORAL 3 TIMES DAILY
Qty: 15 TABLET | Refills: 0 | Status: SHIPPED | OUTPATIENT
Start: 2018-01-14 | End: 2018-01-16 | Stop reason: SDUPTHER

## 2018-01-14 RX ADMIN — BACLOFEN 10 MG: 10 TABLET ORAL at 23:01

## 2018-01-14 RX ADMIN — HYDROCODONE BITARTRATE AND ACETAMINOPHEN 1 TABLET: 7.5; 325 TABLET ORAL at 23:01

## 2018-01-15 NOTE — ED NOTES
Pt reports L side chest pain is worsened with palpation. He denies bowl/bladder incontinence, denies numbness/tingling. Reassurance given; call light in reach. Pts breathing even and unlabored. Pt appears in NAD at this time. Family at bedside.        Sharonda Lucero RN  01/14/18 5708
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
## 2018-01-15 NOTE — ED PROVIDER NOTES
" EMERGENCY DEPARTMENT ENCOUNTER    CHIEF COMPLAINT  Chief Complaint: L sided rib pain  History given by: Pt  History limited by: Nothing  Room Number: 42/42  PMD: Regina Landis MD      HPI:  Pt is a 62 y.o. male with a hx of GERD and prostate cancer who presents complaining of L sided rib pain onset about four days ago. He reports he sustained a fall about one week ago which he believes started his symptoms. He denies LOC or head injury secondary to his fall. Pt also complains of SOA secondary to pain and L shoulder pain but denies CP, bruising to the effected area, or any other symptoms at this time. Pt states his pain is worsened with movement, palpation, and deep breathing. He reports he has been ambulatory since his fall.    Duration:  Four days  Onset: sudden  Timing: constant  Location: L ribs  Radiation: none  Quality: \"pain\"  Intensity/Severity: moderate  Progression: unchanged  Associated Symptoms: SOA secondary to pain and L shoulder pain  Aggravating Factors: movement, palpation  Alleviating Factors: none  Previous Episodes: none  Treatment before arrival: Pt received no treatment PTA.    PAST MEDICAL HISTORY  Active Ambulatory Problems     Diagnosis Date Noted   • Prostate cancer 10/19/2016   • Ileus 10/23/2016   • Herpes zoster without complication 01/27/2017     Resolved Ambulatory Problems     Diagnosis Date Noted   • No Resolved Ambulatory Problems     Past Medical History:   Diagnosis Date   • Allergy-induced asthma    • Anesthesia complication    • Bruises easily    • GERD (gastroesophageal reflux disease)    • Hypothyroidism    • Joint pain    • Prostate cancer        PAST SURGICAL HISTORY  Past Surgical History:   Procedure Laterality Date   • KNEE ARTHROSCOPY Left    • KNEE MENISCAL REPAIR Right    • PROSTATECTOMY N/A 10/19/2016    Procedure: PROSTATECTOMY LAPAROSCOPIC WITH DAVINCI ROBOT;  Surgeon: Slade Borrego MD;  Location: [facility];  Service:    • TONSILLECTOMY      " CHILDHOOD   • VASECTOMY         FAMILY HISTORY  Family History   Problem Relation Age of Onset   • Anxiety disorder Mother    • Coronary artery disease Mother      CABG   • Stroke Mother    • Kidney cancer Mother    • Alcohol abuse Father    • Coronary artery disease Father      CABG   • Diabetes Father        SOCIAL HISTORY  Social History     Social History   • Marital status:      Spouse name: N/A   • Number of children: N/A   • Years of education: N/A     Occupational History   • Not on file.     Social History Main Topics   • Smoking status: Never Smoker   • Smokeless tobacco: Never Used   • Alcohol use 1.2 oz/week     2 Cans of beer per week   • Drug use: No   • Sexual activity: Defer     Other Topics Concern   • Not on file     Social History Narrative   • No narrative on file       ALLERGIES  Penicillins    REVIEW OF SYSTEMS  Review of Systems   Constitutional: Negative for fever.   Respiratory: Positive for shortness of breath (secondary to pain).    Cardiovascular: Negative for chest pain.   Musculoskeletal: Positive for arthralgias (L ribs and L shoulder).       PHYSICAL EXAM  ED Triage Vitals   Temp Heart Rate Resp BP SpO2   01/14/18 2103 01/14/18 2035 01/14/18 2035 01/14/18 2106 01/14/18 2035   98.5 °F (36.9 °C) 106 20 119/74 97 %      Temp src Heart Rate Source Patient Position BP Location FiO2 (%)   01/14/18 2103 01/14/18 2035 -- -- --   Tympanic Monitor          Physical Exam   Constitutional: No distress.   HENT:   Head: Normocephalic and atraumatic.   Eyes: EOM are normal.   Neck: Normal range of motion.   Cardiovascular: Normal heart sounds.    Pulmonary/Chest: No respiratory distress. He exhibits tenderness (L anteior lower and lateral chest wall). He exhibits no deformity.   Abdominal: There is no tenderness.   Musculoskeletal: He exhibits no edema.        Left shoulder: He exhibits tenderness and pain. He exhibits no deformity.   Neurological: He is alert.   Skin: Skin is warm and dry.    Nursing note and vitals reviewed.      RADIOLOGY  XR Shoulder 2+ View Left   Preliminary Result   No acute findings.           ----------------------------------------------------------------       LEFT SHOULDER 2 VIEWS.       HISTORY: Fall, shoulder pain.       COMPARISON: No prior studies for comparison.       FINDINGS:   There is no fracture or dislocation.           Soft tissue structures are unremarkable.       IMPRESSION:   No acute fracture or dislocation.              XR Ribs Left With PA Chest   Preliminary Result   No acute findings.           ----------------------------------------------------------------       LEFT SHOULDER 2 VIEWS.       HISTORY: Fall, shoulder pain.       COMPARISON: No prior studies for comparison.       FINDINGS:   There is no fracture or dislocation.           Soft tissue structures are unremarkable.       IMPRESSION:   No acute fracture or dislocation.                   I ordered the above noted radiological studies. Interpreted by radiologist. Reviewed by me in PACS.       PROCEDURES  Procedures    EKG           EKG time: 2039  Rhythm/Rate: NSR, 88  P waves and NH: nml  QRS, axis: Q waves in V1   ST and T waves: nml     Interpreted Contemporaneously by me, independently viewed  Unchanged compared to prior 10/12/2016    PROGRESS AND CONSULTS  ED Course     2104 Ordered XR Ribs L with PA Chest and XR L Shoulder for further evaluation    2245 BP- 136/99 HR- 87 Temp- 98.5 °F (36.9 °C) (Tympanic) O2 sat- 96%. Rechecked the patient who is in NAD and is resting comfortably. Informed pt of negative imaging results. Will discharge with pain medication. Suggested pt follow up with PCP. Pt understands and agrees with treatment. All questions and concerns were addressed at this time.    2249 Ordered hydrocodone and baclofen for treatment of pain    MEDICAL DECISION MAKING  Results were reviewed/discussed with the patient and they were also made aware of online access. Pt also made aware  that some labs, such as cultures, will not be resulted during ER visit and follow up with PMD is necessary.     MDM  Number of Diagnoses or Management Options     Amount and/or Complexity of Data Reviewed  Tests in the radiology section of CPT®: ordered and reviewed (XRC and XR Rib L PA Chest shows NAD.)  Tests in the medicine section of CPT®: ordered and reviewed (See EKG procedure note.)  Decide to obtain previous medical records or to obtain history from someone other than the patient: yes  Review and summarize past medical records: yes  Independent visualization of images, tracings, or specimens: yes    Patient Progress  Patient progress: stable         DIAGNOSIS  Final diagnoses:   Chest wall contusion, left, initial encounter       DISPOSITION  DISCHARGE    Patient discharged in stable condition.    Reviewed implications of results, diagnosis, meds, responsibility to follow up, warning signs and symptoms of possible worsening, potential complications and reasons to return to ER, including new or worsening symptoms.    Patient/Family voiced understanding of above instructions.    Discussed plan for discharge, as there is no emergent indication for admission.  Pt/family is agreeable and understands need for follow up and repeat testing.  Pt is aware that discharge does not mean that nothing is wrong but it indicates no emergency is present that requires admission and they must continue care with follow-up as given below or physician of their choice.     FOLLOW-UP  No follow-up provider specified.       Medication List      Notice     No changes were made to your prescriptions during this visit.            Latest Documented Vital Signs:  As of 11:22 PM  BP- 136/85 HR- 85 Temp- 98.5 °F (36.9 °C) (Tympanic) O2 sat- 95%    --    SEGUN query complete. Treatment plan to include limited course of prescribed  controlled substance. Risks including addiction, benefits, and alternatives presented to patient.      Documentation assistance provided by mayra Olivas for Dr. Larios.  Information recorded by the scribe was done at my direction and has been verified and validated by me.       Getachew Olivas  01/14/18 8610       Sameer Larios MD  01/15/18 1932

## 2018-01-15 NOTE — DISCHARGE INSTRUCTIONS
Take medication as prescribed.  Take ibuprofen or Naprosyn as needed for pain.  Follow-up with Dr. Landis later this week if symptoms have not improved.  Return to emergency department for worsening pain, fever, difficulty breathing, or other concern.

## 2018-01-16 ENCOUNTER — TRANSCRIBE ORDERS (OUTPATIENT)
Dept: ADMINISTRATIVE | Facility: HOSPITAL | Age: 63
End: 2018-01-16

## 2018-01-16 ENCOUNTER — OFFICE VISIT (OUTPATIENT)
Dept: INTERNAL MEDICINE | Facility: CLINIC | Age: 63
End: 2018-01-16

## 2018-01-16 VITALS
WEIGHT: 215 LBS | OXYGEN SATURATION: 97 % | DIASTOLIC BLOOD PRESSURE: 70 MMHG | BODY MASS INDEX: 29.16 KG/M2 | SYSTOLIC BLOOD PRESSURE: 112 MMHG | HEART RATE: 78 BPM

## 2018-01-16 DIAGNOSIS — J45.40 MODERATE PERSISTENT ASTHMA, UNSPECIFIED WHETHER COMPLICATED: ICD-10-CM

## 2018-01-16 DIAGNOSIS — C61 PROSTATE CANCER (HCC): ICD-10-CM

## 2018-01-16 DIAGNOSIS — R07.81 RIB PAIN ON LEFT SIDE: Primary | ICD-10-CM

## 2018-01-16 DIAGNOSIS — C61 PROSTATE CANCER (HCC): Primary | ICD-10-CM

## 2018-01-16 PROCEDURE — 99214 OFFICE O/P EST MOD 30 MIN: CPT | Performed by: INTERNAL MEDICINE

## 2018-01-16 PROCEDURE — 94640 AIRWAY INHALATION TREATMENT: CPT | Performed by: INTERNAL MEDICINE

## 2018-01-16 RX ORDER — HYDROCODONE BITARTRATE AND ACETAMINOPHEN 10; 325 MG/1; MG/1
1 TABLET ORAL EVERY 6 HOURS PRN
Qty: 30 TABLET | Refills: 0 | Status: SHIPPED | OUTPATIENT
Start: 2018-01-16 | End: 2018-05-08

## 2018-01-16 RX ORDER — LEVALBUTEROL INHALATION SOLUTION 0.63 MG/3ML
0.63 SOLUTION RESPIRATORY (INHALATION) EVERY 6 HOURS PRN
Status: DISCONTINUED | OUTPATIENT
Start: 2018-01-16 | End: 2021-10-26

## 2018-01-16 RX ADMIN — LEVALBUTEROL INHALATION SOLUTION 0.63 MG: 0.63 SOLUTION RESPIRATORY (INHALATION) at 15:37

## 2018-01-16 NOTE — PROGRESS NOTES
Chief Complaint   Patient presents with   • Fall     1-8-2017.   went to ER yesterday for pain on left side was giving pain meds   rib pain, abdominal hernia, prostate cancer.       History of Present Illness   Girish Greenberg is a 62 y.o. male presents for acute care w/ a new need to me. Patient reports that he had a recent fall. He struck his left side. Now w/ left side pain and left arm pain.   Additional c/o some dyspnea. He has not been able to appropriately administer his symbicort or albuterol due to rib pain.  At ER he was given hydrocodone 7.5/325 mg as well as a muscle relaxer. This provides about 4 1/2 hours of relief. And he is taking the medication 4 times a day.    Has prostate ca. Recent elevation in psa. He is scheduled to meet w/ rad onc this week. Some dissatisfaction w/ his urologist.   Complaint of abdominal hernia.   The following portions of the patient's history were reviewed and updated as appropriate: allergies, current medications, past family history, past medical history, past social history, past surgical history and problem list.  Current Outpatient Prescriptions on File Prior to Visit   Medication Sig Dispense Refill   • albuterol (PROVENTIL HFA;VENTOLIN HFA) 108 (90 BASE) MCG/ACT inhaler Inhale 2 puffs Every 6 (Six) Hours As Needed for shortness of air.     • budesonide-formoterol (SYMBICORT) 160-4.5 MCG/ACT inhaler Inhale 2 puffs Daily.     • HYDROcodone-acetaminophen (NORCO) 5-325 MG per tablet Take 1 tablet by mouth Every 6 (Six) Hours As Needed for Moderate Pain . 12 tablet 0   • metaxalone (SKELAXIN) 800 MG tablet Take 1 tablet by mouth 3 (Three) Times a Day As Needed for Muscle Spasms. 21 tablet 2   • thyroid (ARMOUR) 65 MG tablet Take 65 mg by mouth Daily.     • [DISCONTINUED] gabapentin (NEURONTIN) 300 MG capsule Take 1 capsule by mouth 3 (Three) Times a Day. 90 capsule 1   • [DISCONTINUED] lidocaine (LIDODERM) 5 % Place 2 patches on the skin Daily. Remove & Discard patch within  12 hours or as directed by MD Mcallister patch 3   • [DISCONTINUED] meloxicam (MOBIC) 15 MG tablet Take 1 tablet by mouth Daily. 30 tablet 2   • [DISCONTINUED] metaxalone (SKELAXIN) 800 MG tablet Take 1 tablet by mouth 3 (Three) Times a Day. 15 tablet 0   • [DISCONTINUED] MethylPREDNISolone (MEDROL, SANDEE,) 4 MG tablet Take as directed on package instructions. 21 tablet 0   • [DISCONTINUED] valACYclovir (VALTREX) 1000 MG tablet Take 1 tablet by mouth 3 (Three) Times a Day. 21 tablet 0     No current facility-administered medications on file prior to visit.      Review of Systems   Constitutional: Negative.    HENT: Negative.    Eyes: Negative.    Respiratory: Positive for shortness of breath.    Cardiovascular: Negative.    Gastrointestinal: Positive for constipation.   Endocrine: Negative.    Genitourinary: Negative.    Musculoskeletal:        Left side rib pain   Skin: Negative.    Allergic/Immunologic: Negative.    Neurological: Negative.    Hematological: Negative.    Psychiatric/Behavioral: Negative.        Objective   Physical Exam   Constitutional: He is oriented to person, place, and time. He appears well-developed and well-nourished.   Discomfort from pain   HENT:   Head: Normocephalic and atraumatic.   Right Ear: External ear normal.   Left Ear: External ear normal.   Nose: Nose normal.   Mouth/Throat: Oropharynx is clear and moist.   Eyes: Conjunctivae and EOM are normal. Pupils are equal, round, and reactive to light.   Neck: Normal range of motion. Neck supple.   Cardiovascular: Normal rate, regular rhythm, normal heart sounds and intact distal pulses.    Pulmonary/Chest: Effort normal and breath sounds normal.   Abdominal: Soft. Bowel sounds are normal.   Reducible hernia above scar above umbilicus   Musculoskeletal:   Acute tenderness left side in ribcage to palpation.      Neurological: He is alert and oriented to person, place, and time.   Skin: Skin is warm and dry.   Psychiatric: He has a normal mood and  affect. His behavior is normal. Judgment and thought content normal.   Nursing note and vitals reviewed.       /70  Pulse 78  Wt 97.5 kg (215 lb)  SpO2 97%  BMI 29.16 kg/m2    Assessment/Plan   Diagnoses and all orders for this visit:    Rib pain on left side    Moderate persistent asthma, unspecified whether complicated    Prostate cancer    Other orders  -     HYDROcodone-acetaminophen (NORCO)  MG per tablet; Take 1 tablet by mouth Every 6 (Six) Hours As Needed for Moderate Pain .        Patient w/ left side pain after a fall. He is not having full relief w/ current med. Will increase strength of med. He is to start norco at 10/325mg q 6 hours as needed for pain. He may also use lidocaine patch. He is encouraged to use a stool softener for this. He has asthma. He will continue symbicort twice daily. Prn albuterol. Nebulizer administered today given his difficulty w/ MDI.     He has prostate cancer. He is to get a PET scan and is to see radiation oncology to determine treatment for recent elevation of PSA.     He has an abdominal hernia. Given current issues I would not recommend corrective procedure at this time. He is advised that if he develops pain or if it becomes nonreducable this should be evaluated immediately. He may use a supportive band or compressive undergarment.     Follow up in 1 month or prn.

## 2018-02-01 ENCOUNTER — HOSPITAL ENCOUNTER (OUTPATIENT)
Dept: PET IMAGING | Facility: HOSPITAL | Age: 63
Discharge: HOME | End: 2018-02-01
Admitting: RADIOLOGY

## 2018-02-01 DIAGNOSIS — C61 PROSTATE CANCER (HCC): ICD-10-CM

## 2018-02-01 PROCEDURE — 78815 PET IMAGE W/CT SKULL-THIGH: CPT

## 2018-02-01 PROCEDURE — A9588 FLUCICLOVINE F-18: HCPCS | Performed by: RADIOLOGY

## 2018-02-01 PROCEDURE — 0 FLUCICLOVINE: Performed by: RADIOLOGY

## 2018-02-01 RX ADMIN — FLUCICLOVINE F-18 1 DOSE: 221 INJECTION, SOLUTION INTRAVENOUS at 13:25

## 2018-02-06 ENCOUNTER — TELEPHONE (OUTPATIENT)
Dept: INTERNAL MEDICINE | Facility: CLINIC | Age: 63
End: 2018-02-06

## 2018-02-06 RX ORDER — OSELTAMIVIR PHOSPHATE 75 MG/1
75 CAPSULE ORAL 2 TIMES DAILY
Qty: 10 CAPSULE | Refills: 0 | Status: SHIPPED | OUTPATIENT
Start: 2018-02-06 | End: 2018-05-08

## 2018-02-06 NOTE — TELEPHONE ENCOUNTER
Send tamiflu 75 mg po qd x 10 d for amilcar. Have diane call immediately if any symptoms. Schedule office visit tomorrow for eh if she needs cxr. Use masks.

## 2018-02-06 NOTE — TELEPHONE ENCOUNTER
Pts wife went to a little clinic and was told she had Flu A   is asking for Tamiflu and also she was with her parents   Bertha and Jigar Jiang.  They also told her she needed a chest xray. Can you put a order in and I can put it on Dr Peters schedule for tomorrow?

## 2018-05-02 DIAGNOSIS — Z00.00 HEALTHCARE MAINTENANCE: Primary | ICD-10-CM

## 2018-05-02 DIAGNOSIS — C61 PROSTATE CANCER (HCC): ICD-10-CM

## 2018-05-02 PROBLEM — B02.9 HERPES ZOSTER WITHOUT COMPLICATION: Status: RESOLVED | Noted: 2017-01-27 | Resolved: 2018-05-02

## 2018-05-04 LAB
ALBUMIN SERPL-MCNC: 4.1 G/DL (ref 3.5–5.2)
ALBUMIN/GLOB SERPL: 2 G/DL
ALP SERPL-CCNC: 27 U/L (ref 39–117)
ALT SERPL-CCNC: 46 U/L (ref 1–41)
AST SERPL-CCNC: 31 U/L (ref 1–40)
BASOPHILS # BLD AUTO: 0.04 10*3/MM3 (ref 0–0.2)
BASOPHILS NFR BLD AUTO: 0.9 % (ref 0–1.5)
BILIRUB SERPL-MCNC: 0.4 MG/DL (ref 0.1–1.2)
BUN SERPL-MCNC: 24 MG/DL (ref 8–23)
BUN/CREAT SERPL: 33.8 (ref 7–25)
CALCIUM SERPL-MCNC: 9 MG/DL (ref 8.6–10.5)
CHLORIDE SERPL-SCNC: 105 MMOL/L (ref 98–107)
CHOLEST SERPL-MCNC: 226 MG/DL (ref 0–200)
CO2 SERPL-SCNC: 24 MMOL/L (ref 22–29)
CREAT SERPL-MCNC: 0.71 MG/DL (ref 0.76–1.27)
EOSINOPHIL # BLD AUTO: 0.21 10*3/MM3 (ref 0–0.7)
EOSINOPHIL NFR BLD AUTO: 4.6 % (ref 0.3–6.2)
ERYTHROCYTE [DISTWIDTH] IN BLOOD BY AUTOMATED COUNT: 14.3 % (ref 11.5–14.5)
GFR SERPLBLD CREATININE-BSD FMLA CKD-EPI: 112 ML/MIN/1.73
GFR SERPLBLD CREATININE-BSD FMLA CKD-EPI: 136 ML/MIN/1.73
GLOBULIN SER CALC-MCNC: 2.1 GM/DL
GLUCOSE SERPL-MCNC: 106 MG/DL (ref 65–99)
HCT VFR BLD AUTO: 41.8 % (ref 40.4–52.2)
HCV AB S/CO SERPL IA: <0.1 S/CO RATIO (ref 0–0.9)
HDLC SERPL-MCNC: 77 MG/DL (ref 40–60)
HGB BLD-MCNC: 13.3 G/DL (ref 13.7–17.6)
IMM GRANULOCYTES # BLD: 0 10*3/MM3 (ref 0–0.03)
IMM GRANULOCYTES NFR BLD: 0 % (ref 0–0.5)
LDLC SERPL CALC-MCNC: 129 MG/DL (ref 0–100)
LDLC/HDLC SERPL: 1.67 {RATIO}
LYMPHOCYTES # BLD AUTO: 0.62 10*3/MM3 (ref 0.9–4.8)
LYMPHOCYTES NFR BLD AUTO: 13.6 % (ref 19.6–45.3)
MCH RBC QN AUTO: 30.2 PG (ref 27–32.7)
MCHC RBC AUTO-ENTMCNC: 31.8 G/DL (ref 32.6–36.4)
MCV RBC AUTO: 94.8 FL (ref 79.8–96.2)
MONOCYTES # BLD AUTO: 0.42 10*3/MM3 (ref 0.2–1.2)
MONOCYTES NFR BLD AUTO: 9.2 % (ref 5–12)
NEUTROPHILS # BLD AUTO: 3.27 10*3/MM3 (ref 1.9–8.1)
NEUTROPHILS NFR BLD AUTO: 71.7 % (ref 42.7–76)
PLATELET # BLD AUTO: 276 10*3/MM3 (ref 140–500)
POTASSIUM SERPL-SCNC: 4.6 MMOL/L (ref 3.5–5.2)
PROT SERPL-MCNC: 6.2 G/DL (ref 6–8.5)
PSA SERPL-MCNC: <0.014 NG/ML (ref 0–4)
RBC # BLD AUTO: 4.41 10*6/MM3 (ref 4.6–6)
SODIUM SERPL-SCNC: 142 MMOL/L (ref 136–145)
TRIGL SERPL-MCNC: 101 MG/DL (ref 0–150)
TSH SERPL DL<=0.005 MIU/L-ACNC: 1.09 MIU/ML (ref 0.27–4.2)
VLDLC SERPL CALC-MCNC: 20.2 MG/DL (ref 5–40)
WBC # BLD AUTO: 4.56 10*3/MM3 (ref 4.5–10.7)

## 2018-05-08 ENCOUNTER — OFFICE VISIT (OUTPATIENT)
Dept: INTERNAL MEDICINE | Facility: CLINIC | Age: 63
End: 2018-05-08

## 2018-05-08 ENCOUNTER — TELEPHONE (OUTPATIENT)
Dept: INTERNAL MEDICINE | Facility: CLINIC | Age: 63
End: 2018-05-08

## 2018-05-08 VITALS
OXYGEN SATURATION: 99 % | BODY MASS INDEX: 29.12 KG/M2 | HEART RATE: 63 BPM | DIASTOLIC BLOOD PRESSURE: 80 MMHG | WEIGHT: 208 LBS | SYSTOLIC BLOOD PRESSURE: 124 MMHG | HEIGHT: 71 IN

## 2018-05-08 DIAGNOSIS — Z00.00 HEALTHCARE MAINTENANCE: Primary | ICD-10-CM

## 2018-05-08 DIAGNOSIS — C61 PROSTATE CANCER (HCC): ICD-10-CM

## 2018-05-08 PROCEDURE — 99396 PREV VISIT EST AGE 40-64: CPT | Performed by: INTERNAL MEDICINE

## 2018-05-08 RX ORDER — ESCITALOPRAM OXALATE 10 MG/1
10 TABLET ORAL DAILY
Qty: 30 TABLET | Refills: 5 | Status: SHIPPED | OUTPATIENT
Start: 2018-05-08 | End: 2018-06-08 | Stop reason: SDUPTHER

## 2018-05-08 RX ORDER — TAMSULOSIN HYDROCHLORIDE 0.4 MG/1
2 CAPSULE ORAL DAILY
Refills: 2 | COMMUNITY
Start: 2018-04-26 | End: 2018-07-10

## 2018-05-08 RX ORDER — ESCITALOPRAM OXALATE 10 MG/1
10 TABLET ORAL DAILY
Qty: 30 TABLET | Refills: 5 | Status: SHIPPED | OUTPATIENT
Start: 2018-05-08 | End: 2018-05-08 | Stop reason: SDUPTHER

## 2018-05-08 NOTE — TELEPHONE ENCOUNTER
Patient may continue preparation H. I sent rx for pramoxine cream instead of foam and he may try this if it is approved.

## 2018-05-08 NOTE — PROGRESS NOTES
"Subjective   CPE  Prostate cancer  Reactive airway  Hemorrhoids    Girish Greenberg is a 62 y.o. male who presents for a complete physical exam.  He has prostate cancer. He is s/p prostatectomy and has just completed XRT. He is having loose stooling with hemorrhoidal flair. He is having urinary incontinence, frequency, discomfort. He has reactive airway. Has been using symbicort about 2-3 times weekly and is not using rescue inhaler as prescribed. On armour thyroid and TSH is wnl.       Review of Systems   Constitutional: Positive for fatigue.   HENT: Negative.    Eyes: Negative.    Respiratory: Negative.    Cardiovascular: Negative.    Gastrointestinal: Positive for diarrhea.   Endocrine: Negative.    Genitourinary: Positive for difficulty urinating, frequency and urgency.   Musculoskeletal: Negative.    Skin: Negative.    Allergic/Immunologic: Negative.    Neurological: Negative.    Hematological: Negative.    Psychiatric/Behavioral: Negative.        The following portions of the patient's history were reviewed and updated as appropriate: allergies, current medications, past family history, past medical history, past social history, past surgical history and problem list.     Patient Active Problem List   Diagnosis   • Prostate cancer   • Ileus   • Rib pain on left side       Past Medical History:   Diagnosis Date   • Allergy-induced asthma    • Anesthesia complication     STATES \"VERY SLOW TO WAKE UP\"   • Bruises easily    • GERD (gastroesophageal reflux disease)    • Hypothyroidism    • Joint pain     KNEES   • Prostate cancer     current prostate       Past Surgical History:   Procedure Laterality Date   • KNEE ARTHROSCOPY Left    • KNEE MENISCAL REPAIR Right    • PROSTATECTOMY N/A 10/19/2016    Procedure: PROSTATECTOMY LAPAROSCOPIC WITH DAVINCI ROBOT;  Surgeon: Slade Borrego MD;  Location: [facility];  Service:    • TONSILLECTOMY      CHILDHOOD   • VASECTOMY         Family History   Problem Relation " "Age of Onset   • Anxiety disorder Mother    • Coronary artery disease Mother      CABG   • Stroke Mother    • Kidney cancer Mother    • Alcohol abuse Father    • Coronary artery disease Father      CABG   • Diabetes Father        Social History     Social History   • Marital status:      Spouse name: N/A   • Number of children: N/A   • Years of education: N/A     Occupational History   • Not on file.     Social History Main Topics   • Smoking status: Never Smoker   • Smokeless tobacco: Never Used   • Alcohol use 1.2 oz/week     2 Cans of beer per week   • Drug use: No   • Sexual activity: Defer     Other Topics Concern   • Not on file     Social History Narrative   • No narrative on file       Current Outpatient Prescriptions on File Prior to Visit   Medication Sig Dispense Refill   • albuterol (PROVENTIL HFA;VENTOLIN HFA) 108 (90 BASE) MCG/ACT inhaler Inhale 2 puffs Every 6 (Six) Hours As Needed for shortness of air.     • budesonide-formoterol (SYMBICORT) 160-4.5 MCG/ACT inhaler Inhale 2 puffs Daily.     • thyroid (ARMOUR) 65 MG tablet Take 65 mg by mouth Daily.     • [DISCONTINUED] HYDROcodone-acetaminophen (NORCO)  MG per tablet Take 1 tablet by mouth Every 6 (Six) Hours As Needed for Moderate Pain . 30 tablet 0   • [DISCONTINUED] HYDROcodone-acetaminophen (NORCO) 5-325 MG per tablet Take 1 tablet by mouth Every 6 (Six) Hours As Needed for Moderate Pain . 12 tablet 0   • [DISCONTINUED] oseltamivir (TAMIFLU) 75 MG capsule Take 1 capsule by mouth 2 (Two) Times a Day. 10 capsule 0     Current Facility-Administered Medications on File Prior to Visit   Medication Dose Route Frequency Provider Last Rate Last Dose   • levalbuterol (XOPENEX) nebulizer solution 0.63 mg  0.63 mg Nebulization Q6H PRN Regina Landis MD   0.63 mg at 01/16/18 1537       Allergies   Allergen Reactions   • Penicillins Other (See Comments)     \"BLACKS OUT\"/ IN CHILDHOOD       Immunization History   Administered Date(s) Administered " "  • Flu Vaccine Quad PF >36MO 10/26/2016   • Hepatitis A 06/03/2014   • Influenza, Quadrivalent 10/28/2015   • Pneumococcal Polysaccharide (PPSV23) 10/28/2015   • Tdap 03/15/2009       Objective     /80   Pulse 63   Ht 180.3 cm (71\")   Wt 94.3 kg (208 lb)   SpO2 99%   BMI 29.01 kg/m²     Physical Exam   Constitutional: He is oriented to person, place, and time. He appears well-developed and well-nourished.   HENT:   Head: Normocephalic and atraumatic.   Right Ear: External ear normal.   Left Ear: External ear normal.   Nose: Nose normal.   Mouth/Throat: Oropharynx is clear and moist.   Eyes: Conjunctivae and EOM are normal. Pupils are equal, round, and reactive to light.   Neck: Normal range of motion. Neck supple.   Cardiovascular: Normal rate, regular rhythm, normal heart sounds and intact distal pulses.    Pulmonary/Chest: Effort normal and breath sounds normal.   Abdominal: Soft. Bowel sounds are normal.   Musculoskeletal: Normal range of motion.   Neurological: He is alert and oriented to person, place, and time.   Skin: Skin is warm and dry.   Psychiatric: He has a normal mood and affect. His behavior is normal. Judgment and thought content normal.   Nursing note and vitals reviewed.      Assessment/Plan   There are no diagnoses linked to this encounter.    Discussion    Patient presents today for a CPE.  Patient with prostate cancer and multiple side effects from this. He has a large hemorrhoid v other in the rectal region that is flared with radiation and increased bm. Will give proctofoam and a colorectal referral. He will start using brio v symbicort in a daily fashion and use albuterol for prn dyspnea. Continue allergy therapy. Start lexapro 10 mg hs for sleep disruption, anxiety, and depression as long as it does not interact w/ eligard. Advise therapy as well. He will follow up in 3 months or prn.      Patient follows a healthy diet.   Patient follows an adequate exercise regimen. Colon " cancer screening is up to date.   Immunizations are up to date.   Immunizations are as per orders.           No future appointments.

## 2018-05-15 DIAGNOSIS — K64.9 HEMORRHOIDS, UNSPECIFIED HEMORRHOID TYPE: ICD-10-CM

## 2018-05-15 DIAGNOSIS — K62.89 RECTAL PAIN: ICD-10-CM

## 2018-05-15 DIAGNOSIS — K62.5 RECTAL BLEEDING: Primary | ICD-10-CM

## 2018-05-21 PROBLEM — M19.90 OSTEOARTHRITIS: Status: ACTIVE | Noted: 2018-05-21

## 2018-05-21 PROBLEM — N40.0 BENIGN PROSTATIC HYPERTROPHY WITHOUT URINARY OBSTRUCTION: Status: ACTIVE | Noted: 2018-05-21

## 2018-05-21 PROBLEM — K21.9 GASTROESOPHAGEAL REFLUX DISEASE: Status: ACTIVE | Noted: 2018-05-21

## 2018-05-21 PROBLEM — G47.33 OBSTRUCTIVE SLEEP APNEA SYNDROME: Status: ACTIVE | Noted: 2018-05-21

## 2018-05-21 RX ORDER — VENLAFAXINE HYDROCHLORIDE 75 MG/1
75 CAPSULE, EXTENDED RELEASE ORAL DAILY
Refills: 2 | COMMUNITY
Start: 2018-04-19 | End: 2018-05-22

## 2018-05-22 ENCOUNTER — OFFICE VISIT (OUTPATIENT)
Dept: SURGERY | Facility: CLINIC | Age: 63
End: 2018-05-22

## 2018-05-22 VITALS
DIASTOLIC BLOOD PRESSURE: 80 MMHG | HEART RATE: 87 BPM | SYSTOLIC BLOOD PRESSURE: 120 MMHG | BODY MASS INDEX: 27.36 KG/M2 | TEMPERATURE: 98 F | OXYGEN SATURATION: 99 % | HEIGHT: 72 IN | WEIGHT: 202 LBS

## 2018-05-22 DIAGNOSIS — T66.XXXA RADIATION THERAPY COMPLICATION, INITIAL ENCOUNTER: ICD-10-CM

## 2018-05-22 DIAGNOSIS — Z80.0 FAMILY HISTORY OF COLON CANCER: ICD-10-CM

## 2018-05-22 DIAGNOSIS — K64.8 INTERNAL HEMORRHOIDS WITH COMPLICATION: Primary | ICD-10-CM

## 2018-05-22 PROCEDURE — 99244 OFF/OP CNSLTJ NEW/EST MOD 40: CPT | Performed by: COLON & RECTAL SURGERY

## 2018-05-22 PROCEDURE — 46600 DIAGNOSTIC ANOSCOPY SPX: CPT | Performed by: COLON & RECTAL SURGERY

## 2018-05-22 RX ORDER — MESALAMINE 1000 MG/1
1000 SUPPOSITORY RECTAL NIGHTLY
Qty: 30 EACH | Refills: 0 | Status: SHIPPED | OUTPATIENT
Start: 2018-05-22 | End: 2018-06-17 | Stop reason: SDUPTHER
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
## 2018-06-08 ENCOUNTER — TELEPHONE (OUTPATIENT)
Dept: INTERNAL MEDICINE | Facility: CLINIC | Age: 63
End: 2018-06-08

## 2018-06-08 RX ORDER — ESCITALOPRAM OXALATE 10 MG/1
10 TABLET ORAL DAILY
Qty: 30 TABLET | Refills: 5 | Status: SHIPPED | OUTPATIENT
Start: 2018-06-08 | End: 2020-11-23

## 2018-06-08 NOTE — TELEPHONE ENCOUNTER
Pt insurance will not cover anxiety medication at [facility] . Pt is asking we call in Snaptee[facility] to [facility]

## 2018-06-18 RX ORDER — MESALAMINE 1000 MG/1
1000 SUPPOSITORY RECTAL NIGHTLY
Qty: 30 SUPPOSITORY | Refills: 0 | Status: SHIPPED | OUTPATIENT
Start: 2018-06-18 | End: 2018-07-17 | Stop reason: SDUPTHER

## 2018-07-10 ENCOUNTER — OFFICE VISIT (OUTPATIENT)
Dept: SURGERY | Facility: CLINIC | Age: 63
End: 2018-07-10

## 2018-07-10 VITALS
RESPIRATION RATE: 18 BRPM | DIASTOLIC BLOOD PRESSURE: 80 MMHG | HEIGHT: 72 IN | SYSTOLIC BLOOD PRESSURE: 130 MMHG | HEART RATE: 87 BPM | BODY MASS INDEX: 27.17 KG/M2 | WEIGHT: 200.62 LBS | TEMPERATURE: 98 F | OXYGEN SATURATION: 99 %

## 2018-07-10 DIAGNOSIS — K62.5 RECTAL BLEEDING: ICD-10-CM

## 2018-07-10 DIAGNOSIS — T66.XXXD RADIATION ADVERSE EFFECT, SUBSEQUENT ENCOUNTER: ICD-10-CM

## 2018-07-10 DIAGNOSIS — K64.8 INTERNAL HEMORRHOIDS WITH COMPLICATION: Primary | ICD-10-CM

## 2018-07-10 PROCEDURE — 99213 OFFICE O/P EST LOW 20 MIN: CPT | Performed by: COLON & RECTAL SURGERY

## 2018-07-10 RX ORDER — SODIUM CHLORIDE, SODIUM LACTATE, POTASSIUM CHLORIDE, CALCIUM CHLORIDE 600; 310; 30; 20 MG/100ML; MG/100ML; MG/100ML; MG/100ML
30 INJECTION, SOLUTION INTRAVENOUS CONTINUOUS
Status: CANCELLED | OUTPATIENT
Start: 2018-08-30

## 2018-07-10 NOTE — PROGRESS NOTES
"Girish Greenberg is a 62 y.o. male in for follow up of Internal hemorrhoids with complication    Radiation adverse effect, subsequent encounter    Rectal bleeding    Pt states that his bottom is feeling better  Using canasa  He feels his hemorrhoids don't come out as much    He does still have bleeding most days  He endorses straining  He pushes on his lower back to help evacuate    His diarrhea is a little better since Dr. Landis d/c'ed one of his medications    Most recent cy 2011    /80   Pulse 87   Temp 98 °F (36.7 °C)   Resp 18   Ht 182.9 cm (72\")   Wt 91 kg (200 lb 9.9 oz)   SpO2 99%   BMI 27.21 kg/m²   Body mass index is 27.21 kg/m².      PE:  Physical Exam   Constitutional: He appears well-developed. No distress.   HENT:   Head: Normocephalic and atraumatic.   Abdominal: Soft. He exhibits no distension.   Musculoskeletal: Normal range of motion.   Neurological: He is alert.   Psychiatric: Thought content normal.         Assessment:   1. Internal hemorrhoids with complication    2. Radiation adverse effect, subsequent encounter    3. Rectal bleeding         Plan:    Some improvement of symptoms with canasa.    For unresolved rectal bleeding, I recommend doing a colonoscopy with APC.  I described the patient risks benefits and alternatives and he wishes to proceed.      Scribed for Edmond Harding MD by Kim Maier PA-C 7/10/2018  This patient was evaluated by me, recommendations made, documentation reviewed, edited, and revised by me, Edmond Harding MD          "

## 2018-07-18 RX ORDER — MESALAMINE 1000 MG/1
SUPPOSITORY RECTAL
Qty: 30 SUPPOSITORY | Refills: 0 | Status: SHIPPED | OUTPATIENT
Start: 2018-07-18 | End: 2022-02-11

## 2018-08-01 ENCOUNTER — TELEPHONE (OUTPATIENT)
Dept: INTERNAL MEDICINE | Facility: CLINIC | Age: 63
End: 2018-08-01

## 2018-09-04 ENCOUNTER — OFFICE VISIT (OUTPATIENT)
Dept: INTERNAL MEDICINE | Facility: CLINIC | Age: 63
End: 2018-09-04

## 2018-09-04 VITALS
BODY MASS INDEX: 29.57 KG/M2 | WEIGHT: 218 LBS | HEART RATE: 70 BPM | DIASTOLIC BLOOD PRESSURE: 74 MMHG | SYSTOLIC BLOOD PRESSURE: 120 MMHG | OXYGEN SATURATION: 98 %

## 2018-09-04 DIAGNOSIS — D17.21 LIPOMA OF RIGHT UPPER EXTREMITY: ICD-10-CM

## 2018-09-04 DIAGNOSIS — K43.9 VENTRAL HERNIA WITHOUT OBSTRUCTION OR GANGRENE: Primary | ICD-10-CM

## 2018-09-04 DIAGNOSIS — M79.89 NODULE OF SOFT TISSUE: ICD-10-CM

## 2018-09-04 PROCEDURE — 99214 OFFICE O/P EST MOD 30 MIN: CPT | Performed by: INTERNAL MEDICINE

## 2018-09-04 PROCEDURE — 90471 IMMUNIZATION ADMIN: CPT | Performed by: INTERNAL MEDICINE

## 2018-09-04 PROCEDURE — 90715 TDAP VACCINE 7 YRS/> IM: CPT | Performed by: INTERNAL MEDICINE

## 2018-09-04 NOTE — PROGRESS NOTES
Chief Complaint   Patient presents with   • Mass     behind l knee and on R arm   • Hernia       History of Present Illness   Girish Greenberg is a 62 y.o. male presents for acute needs. He reports a couple of changes. He has soft tissue nodules behind the left knee. He also has one on the right bicep.   Patient has a ventral hernia. This is uncomfortable.   Has hemorrhoids. He can initiate defication but than has difficulty fully evacuating.   Patient has a history of prostate cancer. He is not back to baseline energy yet.   Has anxiety. Tried meds and declines these. Declines counseling.       The following portions of the patient's history were reviewed and updated as appropriate: allergies, current medications, past family history, past medical history, past social history, past surgical history and problem list.  Current Outpatient Prescriptions on File Prior to Visit   Medication Sig Dispense Refill   • albuterol (PROVENTIL HFA;VENTOLIN HFA) 108 (90 BASE) MCG/ACT inhaler Inhale 2 puffs Every 6 (Six) Hours As Needed for shortness of air.     • budesonide-formoterol (SYMBICORT) 160-4.5 MCG/ACT inhaler Inhale 2 puffs Daily.     • thyroid (ARMOUR) 65 MG tablet Take 65 mg by mouth Daily.     • CANASA 1000 MG suppository UNWRAP AND INSERT 1 SUPPOSITORY INTO THE RECTUM EVERY NIGHT. 30 suppository 0   • escitalopram (LEXAPRO) 10 MG tablet Take 1 tablet by mouth Daily. 30 tablet 5   • pramoxine-hydrocortisone (ANALPRAM HC) 1-1 % cream Apply  topically 2 (Two) Times a Day As Needed for Itching, Irritation or Hemorrhoids. 28.4 g 2     Current Facility-Administered Medications on File Prior to Visit   Medication Dose Route Frequency Provider Last Rate Last Dose   • levalbuterol (XOPENEX) nebulizer solution 0.63 mg  0.63 mg Nebulization Q6H PRN Regina Landis MD   0.63 mg at 01/16/18 1537     Review of Systems   Constitutional: Negative.    HENT: Negative.    Eyes: Negative.    Respiratory: Negative.    Cardiovascular:  Negative.    Gastrointestinal: Negative.    Endocrine: Negative.    Genitourinary: Negative.    Musculoskeletal: Negative.    Skin: Negative.    Allergic/Immunologic: Negative.    Neurological: Negative.    Hematological: Negative.    Psychiatric/Behavioral: Positive for sleep disturbance. The patient is nervous/anxious.        Objective   Physical Exam   Constitutional: He is oriented to person, place, and time. He appears well-developed and well-nourished.   HENT:   Head: Normocephalic and atraumatic.   Right Ear: External ear normal.   Left Ear: External ear normal.   Nose: Nose normal.   Mouth/Throat: Oropharynx is clear and moist.   Eyes: Pupils are equal, round, and reactive to light. Conjunctivae and EOM are normal.   Neck: Normal range of motion. Neck supple.   Cardiovascular: Normal rate, regular rhythm, normal heart sounds and intact distal pulses.    Pulmonary/Chest: Effort normal and breath sounds normal.   Abdominal: Soft. Bowel sounds are normal.   Musculoskeletal: Normal range of motion.   Neurological: He is alert and oriented to person, place, and time.   Skin: Skin is warm and dry.   Soft tissue nodule behind left knee and right bicep   Psychiatric: He has a normal mood and affect. His behavior is normal. Judgment and thought content normal.   Nursing note and vitals reviewed.       /74   Pulse 70   Wt 98.9 kg (218 lb)   SpO2 98%   BMI 29.57 kg/m²     Assessment/Plan   Diagnoses and all orders for this visit:    Ventral hernia without obstruction or gangrene  -     Ambulatory Referral to General Surgery    Lipoma of right upper extremity  -     Ambulatory Referral to General Surgery    Nodule of soft tissue  -     US Soft Tissue; Future    patient w/ ventral hernia. He is interested in surgical corrective options as the area is uncomfortable. Recommend weight loss and core strengthening. He will be referred general surgery for evaluation of this and soft tissue nodules behind left knee  and of right bicep. Will also get u/s of soft tissue nodules.   Declines med or counseling for anxiety.   Patient to try yoga and meditation aps. Advise stool softener and f/u w/ colorectal for cscope as planned. He will follow up routinely.

## 2018-09-10 ENCOUNTER — HOSPITAL ENCOUNTER (OUTPATIENT)
Dept: ULTRASOUND IMAGING | Facility: HOSPITAL | Age: 63
Discharge: HOME | End: 2018-09-10
Admitting: INTERNAL MEDICINE

## 2018-09-10 DIAGNOSIS — M79.89 NODULE OF SOFT TISSUE: ICD-10-CM

## 2018-09-10 PROCEDURE — 76882 US LMTD JT/FCL EVL NVASC XTR: CPT

## 2018-09-24 ENCOUNTER — OFFICE VISIT (OUTPATIENT)
Dept: SURGERY | Facility: CLINIC | Age: 63
End: 2018-09-24

## 2018-09-24 VITALS — WEIGHT: 218.4 LBS | BODY MASS INDEX: 29.58 KG/M2 | OXYGEN SATURATION: 98 % | HEIGHT: 72 IN | HEART RATE: 81 BPM

## 2018-09-24 DIAGNOSIS — K43.2 INCISIONAL HERNIA, WITHOUT OBSTRUCTION OR GANGRENE: Primary | ICD-10-CM

## 2018-09-24 DIAGNOSIS — D17.9 MULTIPLE LIPOMAS: ICD-10-CM

## 2018-09-24 PROCEDURE — 99243 OFF/OP CNSLTJ NEW/EST LOW 30: CPT | Performed by: SURGERY

## 2018-09-24 NOTE — PROGRESS NOTES
SUMMARY (A/P):    63-year-old gentleman with 3 issues today:  -Small supraumbilical incisional hernia which is asymptomatic and easily reducible.  Surgery is not absolutely necessary at this time and he is not desiring to have it fixed.  We discussed the risk of incarceration and strangulation and he will return if it becomes larger or more symptomatic.  -Small lipoma right anterior shoulder, no further workup or treatment required.  -Small subcutaneous nodule posterior to left popliteal fossa which is clearly benign regardless of ultrasound report and does not warrant excision in less it enlarges or becomes more symptomatic.      CC:    Hernia and cutaneous lesions, referred for consultation by Dr. Landis    HPI:    63-year-old gentleman presents with mild supraumbilical pain for the last 2 years that is worse with meals and associated with a bulge.  Interestingly, he had laparoscopic prostatectomy with the main incision being above the umbilicus one year ago but states that the bulge was there prior to the surgery.    PSH:    Colonoscopy 2012 (scheduled with Dr. Harding in December)  Laparoscopic prostatectomy (subsequent radiation treatment to his prostate with increasing PSA)  Umbilical and bilateral inguinal hernias repaired as child    PMH:    Mild gastroesophageal reflux disease  Allergies associated asthma  Obstructive sleep apnea    FAMILY HISTORY:    Mother with colorectal cancer    SOCIAL HISTORY:   Denies tobacco use  Occasional alcohol use    ALLERGIES: reviewed, in Epic    MEDICATIONS: reviewed, in Epic    ROS:  No chest pain or shortness of air.  All other systems reviewed and negative other than presenting complaints.    PHYSICAL EXAM:   Constitutional: Well-developed well-nourished, no acute distress  Vital signs: xxx heart rate 81, weight 218 pounds, height 72 inches, BMI 29.6 xx   Eyes: Conjunctiva normal, sclera nonicteric  ENMT: Hearing grossly normal, oral mucosa moist  Neck: Supple, no palpable  mass, normal thyroid, trachea midline  Respiratory: Clear to auscultation, normal inspiratory effort  Cardiovascular: Regular rate, no murmur, no carotid bruit, no peripheral edema, no jugular venous distention  Gastrointestinal: Soft, nontender, no palpable mass, no hepatosplenomegaly, small reducible supraumbilical hernia, bowel sounds normal  Lymphatics (palpable nodes):  cervical-negative, axillary-negative  Skin:  Warm, dry.  2 cm lipoma anterior to the right shoulder.  Small approximately 1 cm subcutaneous nodule in the left posterior knee which has only benign characteristics (soft, small, likely fatty in nature although cannot completely exclude dermatofibroma)  Musculoskeletal: Symmetric strength, normal gait  Psychiatric: Alert and oriented ×3, normal affect     ALEX BRIDGES M.D.

## 2018-10-03 ENCOUNTER — TELEPHONE (OUTPATIENT)
Dept: INTERNAL MEDICINE | Facility: CLINIC | Age: 63
End: 2018-10-03

## 2018-10-04 NOTE — TELEPHONE ENCOUNTER
I do not know the answer to this question. He would need to contact an organization that does the training and ask this.

## 2019-02-25 RX ORDER — PHENAZOPYRIDINE HYDROCHLORIDE 200 MG/1
200 TABLET, FILM COATED ORAL 3 TIMES DAILY PRN
Qty: 20 TABLET | Refills: 1 | Status: SHIPPED | OUTPATIENT
Start: 2019-02-25 | End: 2021-08-10

## 2019-03-11 ENCOUNTER — TELEPHONE (OUTPATIENT)
Dept: INTERNAL MEDICINE | Facility: CLINIC | Age: 64
End: 2019-03-11

## 2019-03-11 DIAGNOSIS — Z00.00 HEALTHCARE MAINTENANCE: Primary | ICD-10-CM

## 2019-03-11 DIAGNOSIS — E03.9 HYPOTHYROIDISM, UNSPECIFIED TYPE: ICD-10-CM

## 2019-03-11 NOTE — TELEPHONE ENCOUNTER
Patient is scheduled for his physical on April 16. He is going to a different lab facility. He is going to [facility] in [place]. Could you please put in CPE lab orders and fax to [facility].  FAX # [phone]    FYI- Same [facility] that wife went to

## 2019-04-04 DIAGNOSIS — Z12.5 PROSTATE CANCER SCREENING: Primary | ICD-10-CM

## 2019-06-27 ENCOUNTER — TELEPHONE (OUTPATIENT)
Dept: INTERNAL MEDICINE | Facility: CLINIC | Age: 64
End: 2019-06-27

## 2019-06-27 NOTE — TELEPHONE ENCOUNTER
Pt is asking for a letter to fly with his new service dog. He states he got the dog for emotional support for his cancer.

## 2019-06-28 NOTE — TELEPHONE ENCOUNTER
Patient cancelled last 2 office visits and it does not look to be rescheduled. Please encourage to reschedule this. Note written. I do not know the flight regulations for this.

## 2019-08-05 ENCOUNTER — DOCUMENTATION (OUTPATIENT)
Dept: INTERNAL MEDICINE | Facility: CLINIC | Age: 64
End: 2019-08-05

## 2019-08-05 ENCOUNTER — TELEPHONE (OUTPATIENT)
Dept: INTERNAL MEDICINE | Facility: CLINIC | Age: 64
End: 2019-08-05

## 2019-08-05 PROBLEM — F32.9 REACTIVE DEPRESSION: Status: ACTIVE | Noted: 2019-08-05

## 2019-08-05 NOTE — PROGRESS NOTES
"Patient calls and requests documentation to allow for flight with his therapy dog. Patient reports a history of depression related to prostate cancer and treatment for this. He became extremely depressed when he was on hormonal therapy to reduce testosterone. Tried lexapro and this \"made him a mess\". He then purchased a therapy dog \"rodrigue\" and this has actually worked very well for his mood. When feeling anxious she sits on his lap and has a calming effect. When depressed she does elevate his mood. Documentation completed for [employer]. Patient to schedule a CPE for further evaluation and treatment.   JW  "

## 2019-08-15 ENCOUNTER — TELEPHONE (OUTPATIENT)
Dept: INTERNAL MEDICINE | Facility: CLINIC | Age: 64
End: 2019-08-15

## 2019-10-08 ENCOUNTER — OFFICE VISIT (OUTPATIENT)
Dept: INTERNAL MEDICINE | Facility: CLINIC | Age: 64
End: 2019-10-08

## 2019-10-08 VITALS
BODY MASS INDEX: 28.04 KG/M2 | DIASTOLIC BLOOD PRESSURE: 86 MMHG | OXYGEN SATURATION: 98 % | SYSTOLIC BLOOD PRESSURE: 125 MMHG | HEART RATE: 75 BPM | WEIGHT: 207 LBS | HEIGHT: 72 IN

## 2019-10-08 DIAGNOSIS — E03.9 HYPOTHYROIDISM, UNSPECIFIED TYPE: ICD-10-CM

## 2019-10-08 DIAGNOSIS — C61 PROSTATE CANCER (HCC): ICD-10-CM

## 2019-10-08 DIAGNOSIS — Z00.00 HEALTHCARE MAINTENANCE: Primary | ICD-10-CM

## 2019-10-08 DIAGNOSIS — Z12.11 COLON CANCER SCREENING: ICD-10-CM

## 2019-10-08 PROBLEM — N40.0 BENIGN PROSTATIC HYPERPLASIA WITHOUT URINARY OBSTRUCTION: Status: RESOLVED | Noted: 2018-05-21 | Resolved: 2019-10-08

## 2019-10-08 PROBLEM — K21.9 GASTROESOPHAGEAL REFLUX DISEASE: Status: RESOLVED | Noted: 2018-05-21 | Resolved: 2019-10-08

## 2019-10-08 PROBLEM — K62.5 RECTAL BLEEDING: Status: RESOLVED | Noted: 2018-07-10 | Resolved: 2019-10-08

## 2019-10-08 LAB
ALBUMIN SERPL-MCNC: 4.5 G/DL (ref 3.5–5.2)
ALBUMIN/GLOB SERPL: 2 G/DL
ALP SERPL-CCNC: 26 U/L (ref 39–117)
ALT SERPL-CCNC: 22 U/L (ref 1–41)
AST SERPL-CCNC: 21 U/L (ref 1–40)
BILIRUB SERPL-MCNC: 0.4 MG/DL (ref 0.2–1.2)
BUN SERPL-MCNC: 20 MG/DL (ref 8–23)
BUN/CREAT SERPL: 23.8 (ref 7–25)
CALCIUM SERPL-MCNC: 9.2 MG/DL (ref 8.6–10.5)
CHLORIDE SERPL-SCNC: 103 MMOL/L (ref 98–107)
CO2 SERPL-SCNC: 24.4 MMOL/L (ref 22–29)
CREAT SERPL-MCNC: 0.84 MG/DL (ref 0.76–1.27)
GLOBULIN SER CALC-MCNC: 2.2 GM/DL
GLUCOSE SERPL-MCNC: 105 MG/DL (ref 65–99)
POTASSIUM SERPL-SCNC: 4.5 MMOL/L (ref 3.5–5.2)
PROT SERPL-MCNC: 6.7 G/DL (ref 6–8.5)
SODIUM SERPL-SCNC: 139 MMOL/L (ref 136–145)
TSH SERPL DL<=0.005 MIU/L-ACNC: 1.91 UIU/ML (ref 0.27–4.2)

## 2019-10-08 PROCEDURE — 93000 ELECTROCARDIOGRAM COMPLETE: CPT | Performed by: INTERNAL MEDICINE

## 2019-10-08 PROCEDURE — 99396 PREV VISIT EST AGE 40-64: CPT | Performed by: INTERNAL MEDICINE
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
## 2020-11-23 ENCOUNTER — OFFICE VISIT (OUTPATIENT)
Dept: INTERNAL MEDICINE | Facility: CLINIC | Age: 65
End: 2020-11-23

## 2020-11-23 VITALS
HEIGHT: 72 IN | BODY MASS INDEX: 27.5 KG/M2 | HEART RATE: 74 BPM | WEIGHT: 203 LBS | SYSTOLIC BLOOD PRESSURE: 126 MMHG | DIASTOLIC BLOOD PRESSURE: 78 MMHG

## 2020-11-23 DIAGNOSIS — Z00.00 HEALTHCARE MAINTENANCE: Primary | ICD-10-CM

## 2020-11-23 DIAGNOSIS — Z12.5 SCREENING PSA (PROSTATE SPECIFIC ANTIGEN): ICD-10-CM

## 2020-11-23 DIAGNOSIS — E03.9 HYPOTHYROIDISM, UNSPECIFIED TYPE: ICD-10-CM

## 2020-11-23 DIAGNOSIS — C61 PROSTATE CANCER (HCC): ICD-10-CM

## 2020-11-23 DIAGNOSIS — E55.9 VITAMIN D DEFICIENCY: ICD-10-CM

## 2020-11-23 DIAGNOSIS — R53.83 FATIGUE, UNSPECIFIED TYPE: ICD-10-CM

## 2020-11-23 PROCEDURE — G0402 INITIAL PREVENTIVE EXAM: HCPCS | Performed by: INTERNAL MEDICINE

## 2020-11-23 PROCEDURE — 99214 OFFICE O/P EST MOD 30 MIN: CPT | Performed by: INTERNAL MEDICINE

## 2020-11-23 RX ORDER — TRAZODONE HYDROCHLORIDE 50 MG/1
50 TABLET ORAL NIGHTLY
Qty: 30 TABLET | Refills: 5 | Status: SHIPPED | OUTPATIENT
Start: 2020-11-23 | End: 2021-03-15

## 2020-11-23 NOTE — PROGRESS NOTES
"The ABCs of the Annual Wellness Visit  Initial Medicare Wellness Visit    Chief Complaint   Patient presents with   • Annual Exam   • Urinary Frequency   • Prostate Cancer   • Hypothyroidism       Subjective   History of Present Illness:  Girish Greenberg is a 65 y.o. male who presents for an Initial Medicare Wellness Visit, to review chronic issues and to address any acute needs. Patient has concerns of decreased energy. He is cycling \"a little bit\", walking. Has history prostate ca. He notes that after treatment he has urinary frequency w/ q 2 hour nocturia and frequent urination throughout the day. Control is poor after treatment for prostate ca. Following w/ urology but senses that they can not provide any additional help. He takes armour thyroid rx by his wife's cousin a homeopathic practitioner. He is also on a myriad of vitamins from [facility].       He is s/p right knee tkr.   HEALTH RISK ASSESSMENT    Recent Hospitalizations:  No hospitalization(s) within the last year.except for knee surgery.     Current Medical Providers:  Patient Care Team:  Alicia Marina MD as PCP - General (Internal Medicine)  Regina Landis MD as PCP - Family Medicine  Slade Borrego MD as Consulting Physician (Urology)  Brian Sabillon MD as Consulting Physician (General Surgery)    Smoking Status:  Social History     Tobacco Use   Smoking Status Never Smoker   Smokeless Tobacco Never Used       Alcohol Consumption:  Social History     Substance and Sexual Activity   Alcohol Use Yes   • Alcohol/week: 2.0 standard drinks   • Types: 2 Cans of beer per week       Depression Screen:   No flowsheet data found.    Fall Risk Screen:  STEADI Fall Risk Assessment has not been completed.    Health Habits and Functional and Cognitive Screening:  No flowsheet data found.      Does the patient have evidence of cognitive impairment? No    Asprin use counseling:Does not need ASA (and currently is not on it)    Age-appropriate " Screening Schedule:  Refer to the list below for future screening recommendations based on patient's age, sex and/or medical conditions. Orders for these recommended tests are listed in the plan section. The patient has been provided with a written plan.    Health Maintenance   Topic Date Due   • COLONOSCOPY  1955   • ZOSTER VACCINE (1 of 2) 09/06/2005   • TDAP/TD VACCINES (3 - Td) 09/04/2028   • INFLUENZA VACCINE  Completed          The following portions of the patient's history were reviewed and updated as appropriate: problem list.    Outpatient Medications Prior to Visit   Medication Sig Dispense Refill   • albuterol (PROVENTIL HFA;VENTOLIN HFA) 108 (90 BASE) MCG/ACT inhaler Inhale 2 puffs Every 6 (Six) Hours As Needed for shortness of air.     • Ascorbic Acid (VITAMIN C PO) Take 1 tablet by mouth Daily.     • budesonide-formoterol (SYMBICORT) 160-4.5 MCG/ACT inhaler Inhale 2 puffs Daily.     • CANASA 1000 MG suppository UNWRAP AND INSERT 1 SUPPOSITORY INTO THE RECTUM EVERY NIGHT. 30 suppository 0   • Cholecalciferol (VITAMIN D PO) Take 1 capsule by mouth Daily.     • Coenzyme Q10 (CO Q 10 PO) Take 1 capsule by mouth Daily.     • Cyanocobalamin (B-12 PO) Take 1 capsule by mouth Daily.     • Green Tea, Camillia sinensis, (GREEN TEA PO) Take 1 tablet by mouth Daily.     • Red Yeast Rice Extract (RED YEAST RICE PO) Take 1 capsule by mouth Daily.     • thyroid (ARMOUR) 65 MG tablet Take 65 mg by mouth Daily.     • TURMERIC CURCUMIN PO Take 1 capsule by mouth Daily.     • phenazopyridine (PYRIDIUM) 200 MG tablet Take 1 tablet by mouth 3 (Three) Times a Day As Needed for bladder spasms. 20 tablet 1   • pramoxine-hydrocortisone (ANALPRAM HC) 1-1 % cream Apply  topically 2 (Two) Times a Day As Needed for Itching, Irritation or Hemorrhoids. 28.4 g 2   • escitalopram (LEXAPRO) 10 MG tablet Take 1 tablet by mouth Daily. 30 tablet 5     Facility-Administered Medications Prior to Visit   Medication Dose Route  "Frequency Provider Last Rate Last Admin   • levalbuterol (XOPENEX) nebulizer solution 0.63 mg  0.63 mg Nebulization Q6H PRN Regina Landis MD   0.63 mg at 01/16/18 1537       Patient Active Problem List   Diagnosis   • Prostate cancer (CMS/HCC)   • Rib pain on left side   • Obstructive sleep apnea syndrome   • Osteoarthritis   • Reactive depression       Advanced Care Planning:  ACP discussion was held with the patient during this visit. Patient does not have an advance directive, information provided.    Review of Systems   Constitutional: Positive for fatigue.   HENT: Negative.    Eyes: Negative.    Respiratory: Negative.    Cardiovascular: Negative.    Gastrointestinal: Negative.    Endocrine: Negative.    Genitourinary: Positive for frequency.   Musculoskeletal: Positive for arthralgias.   Skin: Negative.    Allergic/Immunologic: Negative.    Neurological: Negative.    Hematological: Negative.    Psychiatric/Behavioral: Negative.        Compared to one year ago, the patient feels his physical health is the same.  Compared to one year ago, the patient feels his mental health is the same.    Reviewed chart for potential of high risk medication in the elderly: yes  Reviewed chart for potential of harmful drug interactions in the elderly:yes    Objective         Vitals:    11/23/20 1424   BP: 126/78   Pulse: 74   Weight: 92.1 kg (203 lb)   Height: 182.9 cm (72\")       Body mass index is 27.53 kg/m².  Discussed the patient's BMI with him. The BMI is in the acceptable range.    Physical Exam  Vitals signs and nursing note reviewed.   Constitutional:       Appearance: Normal appearance.   HENT:      Head: Normocephalic and atraumatic.      Right Ear: Tympanic membrane normal.      Left Ear: Tympanic membrane normal.      Nose: Nose normal.      Mouth/Throat:      Mouth: Mucous membranes are moist.   Eyes:      Extraocular Movements: Extraocular movements intact.      Pupils: Pupils are equal, round, and reactive to " light.   Neck:      Musculoskeletal: Normal range of motion.   Cardiovascular:      Rate and Rhythm: Normal rate and regular rhythm.      Pulses: Normal pulses.   Pulmonary:      Effort: Pulmonary effort is normal.      Breath sounds: Normal breath sounds.   Abdominal:      General: Abdomen is flat.      Palpations: Abdomen is soft.   Musculoskeletal: Normal range of motion.   Skin:     General: Skin is warm and dry.   Neurological:      General: No focal deficit present.      Mental Status: He is alert and oriented to person, place, and time.   Psychiatric:         Mood and Affect: Mood normal.         Behavior: Behavior normal.         Thought Content: Thought content normal.         Judgment: Judgment normal.               Assessment/Plan   Medicare Risks and Personalized Health Plan  CMS Preventative Services Quick Reference  Abdominal Aortic Aneurysm Screening  Advance Directive Discussion  Cardiovascular risk    The above risks/problems have been discussed with the patient.  Pertinent information has been shared with the patient in the After Visit Summary.  Follow up plans and orders are seen below in the Assessment/Plan Section.    Diagnoses and all orders for this visit:    1. Healthcare maintenance (Primary)  -     CBC & Differential  -     Comprehensive Metabolic Panel  -     Lipid Panel With LDL / HDL Ratio  -     TSH  -     Urinalysis With Culture If Indicated -  -     T4, Free  -     PSA Screen  -     T3, free  -     Vitamin B12  -     Vitamin D 25 Hydroxy  -     Folate    2. Prostate cancer (CMS/HCC)  -     PSA Screen    3. Hypothyroidism, unspecified type  -     CBC & Differential  -     Comprehensive Metabolic Panel  -     Lipid Panel With LDL / HDL Ratio  -     TSH  -     Urinalysis With Culture If Indicated -  -     T4, Free  -     PSA Screen  -     T3, free  -     Vitamin B12  -     Vitamin D 25 Hydroxy  -     Folate    4. Fatigue, unspecified type  -     CBC & Differential  -     Comprehensive  Metabolic Panel  -     Lipid Panel With LDL / HDL Ratio  -     TSH  -     Urinalysis With Culture If Indicated -  -     T4, Free  -     PSA Screen  -     T3, free  -     Vitamin B12  -     Vitamin D 25 Hydroxy  -     Folate    5. Vitamin D deficiency  -     Vitamin D 25 Hydroxy    6. Screening PSA (prostate specific antigen)  -     PSA Screen    Other orders  -     traZODone (DESYREL) 50 MG tablet; Take 1 tablet by mouth Every Night.  Dispense: 30 tablet; Refill: 5      Follow Up:  No follow-ups on file.   Healthcare maintenance  Given family history colon cancer continue to advise a colonoscopy. He declines at this time.   Prostate cancer. To follow up w/ urology to see about getting improved rest.   Will test thyroid function  Suspect fatigue is predominantly related  To poor sleep from urinary frequency but will test listed labs to ensure no organic source. Will try trazodone to see if this allows for increased sleep. He will also d/w urologist. Will test listed labs and supplement as indicated. Will test thyroid levels as well.     F/u in 6 mo or prn      An After Visit Summary and PPPS were given to the patient.

## 2020-11-24 LAB
25(OH)D3+25(OH)D2 SERPL-MCNC: 48.7 NG/ML (ref 30–100)
ALBUMIN SERPL-MCNC: 4.4 G/DL (ref 3.5–5.2)
ALBUMIN/GLOB SERPL: 2.3 G/DL
ALP SERPL-CCNC: 29 U/L (ref 39–117)
ALT SERPL-CCNC: 22 U/L (ref 1–41)
APPEARANCE UR: CLEAR
AST SERPL-CCNC: 15 U/L (ref 1–40)
BACTERIA #/AREA URNS HPF: ABNORMAL /HPF
BASOPHILS # BLD AUTO: 0.06 10*3/MM3 (ref 0–0.2)
BASOPHILS NFR BLD AUTO: 1.1 % (ref 0–1.5)
BILIRUB SERPL-MCNC: 0.2 MG/DL (ref 0–1.2)
BILIRUB UR QL STRIP: NEGATIVE
BUN SERPL-MCNC: 21 MG/DL (ref 8–23)
BUN/CREAT SERPL: 28.4 (ref 7–25)
CALCIUM SERPL-MCNC: 9.3 MG/DL (ref 8.6–10.5)
CHLORIDE SERPL-SCNC: 103 MMOL/L (ref 98–107)
CHOLEST SERPL-MCNC: 249 MG/DL (ref 0–200)
CO2 SERPL-SCNC: 28.9 MMOL/L (ref 22–29)
COLOR UR: YELLOW
CREAT SERPL-MCNC: 0.74 MG/DL (ref 0.76–1.27)
CRYSTALS URNS MICRO: ABNORMAL
EOSINOPHIL # BLD AUTO: 0.12 10*3/MM3 (ref 0–0.4)
EOSINOPHIL NFR BLD AUTO: 2.1 % (ref 0.3–6.2)
EPI CELLS #/AREA URNS HPF: ABNORMAL /HPF (ref 0–10)
ERYTHROCYTE [DISTWIDTH] IN BLOOD BY AUTOMATED COUNT: 13.1 % (ref 12.3–15.4)
FOLATE SERPL-MCNC: 17.5 NG/ML (ref 4.78–24.2)
GLOBULIN SER CALC-MCNC: 1.9 GM/DL
GLUCOSE SERPL-MCNC: 95 MG/DL (ref 65–99)
GLUCOSE UR QL: NEGATIVE
HCT VFR BLD AUTO: 41.2 % (ref 37.5–51)
HDLC SERPL-MCNC: 87 MG/DL (ref 40–60)
HGB BLD-MCNC: 14.1 G/DL (ref 13–17.7)
HGB UR QL STRIP: NEGATIVE
IMM GRANULOCYTES # BLD AUTO: 0.02 10*3/MM3 (ref 0–0.05)
IMM GRANULOCYTES NFR BLD AUTO: 0.4 % (ref 0–0.5)
KETONES UR QL STRIP: ABNORMAL
LDLC SERPL CALC-MCNC: 123 MG/DL (ref 0–100)
LDLC/HDLC SERPL: 1.34 {RATIO}
LEUKOCYTE ESTERASE UR QL STRIP: NEGATIVE
LYMPHOCYTES # BLD AUTO: 1.05 10*3/MM3 (ref 0.7–3.1)
LYMPHOCYTES NFR BLD AUTO: 18.7 % (ref 19.6–45.3)
MCH RBC QN AUTO: 30.6 PG (ref 26.6–33)
MCHC RBC AUTO-ENTMCNC: 34.2 G/DL (ref 31.5–35.7)
MCV RBC AUTO: 89.4 FL (ref 79–97)
MICRO URNS: ABNORMAL
MICRO URNS: ABNORMAL
MONOCYTES # BLD AUTO: 0.35 10*3/MM3 (ref 0.1–0.9)
MONOCYTES NFR BLD AUTO: 6.2 % (ref 5–12)
MUCOUS THREADS URNS QL MICRO: PRESENT /HPF
NEUTROPHILS # BLD AUTO: 4.02 10*3/MM3 (ref 1.7–7)
NEUTROPHILS NFR BLD AUTO: 71.5 % (ref 42.7–76)
NITRITE UR QL STRIP: NEGATIVE
NRBC BLD AUTO-RTO: 0 /100 WBC (ref 0–0.2)
PH UR STRIP: 5.5 [PH] (ref 5–7.5)
PLATELET # BLD AUTO: 315 10*3/MM3 (ref 140–450)
POTASSIUM SERPL-SCNC: 4.1 MMOL/L (ref 3.5–5.2)
PROT SERPL-MCNC: 6.3 G/DL (ref 6–8.5)
PROT UR QL STRIP: NEGATIVE
PSA SERPL-MCNC: 0.14 NG/ML (ref 0–4)
RBC # BLD AUTO: 4.61 10*6/MM3 (ref 4.14–5.8)
RBC #/AREA URNS HPF: ABNORMAL /HPF (ref 0–2)
SODIUM SERPL-SCNC: 137 MMOL/L (ref 136–145)
SP GR UR: 1.03 (ref 1–1.03)
T3FREE SERPL-MCNC: 3.5 PG/ML (ref 2–4.4)
T4 FREE SERPL-MCNC: 1.1 NG/DL (ref 0.93–1.7)
TRIGL SERPL-MCNC: 227 MG/DL (ref 0–150)
TSH SERPL DL<=0.005 MIU/L-ACNC: 2.38 UIU/ML (ref 0.27–4.2)
UNIDENT CRYS URNS QL MICRO: PRESENT /LPF
URINALYSIS REFLEX: ABNORMAL
UROBILINOGEN UR STRIP-MCNC: 0.2 MG/DL (ref 0.2–1)
VIT B12 SERPL-MCNC: 546 PG/ML (ref 211–946)
VLDLC SERPL CALC-MCNC: 39 MG/DL (ref 5–40)
WBC # BLD AUTO: 5.62 10*3/MM3 (ref 3.4–10.8)
WBC #/AREA URNS HPF: ABNORMAL /HPF (ref 0–5)

## 2020-12-03 ENCOUNTER — TELEPHONE (OUTPATIENT)
Dept: INTERNAL MEDICINE | Facility: CLINIC | Age: 65
End: 2020-12-03

## 2020-12-03 NOTE — TELEPHONE ENCOUNTER
Caller: Girish Greenberg    Relationship: Self    Best call back number: [phone]    Medication needed:   NATURE THYROID 90 GRAINS    When do you need the refill by: 12-11-20    What details did the patient provide when requesting the medication: PATIENT STATED DR OMALLEY IS AWARE OF MEDICATION AND NEEDS IT REFILLED.    Does the patient have less than a 3 day supply:  [] Yes  [x] No    What is the patient's preferred pharmacy: [facility] #[id number] - [place], [place] - [address] [facility] [phone] [facility] [phone]

## 2020-12-07 ENCOUNTER — TELEPHONE (OUTPATIENT)
Dept: INTERNAL MEDICINE | Facility: CLINIC | Age: 65
End: 2020-12-07

## 2020-12-07 RX ORDER — THYROID, PORCINE 60 MG/1
65 TABLET ORAL DAILY
Qty: 30 TABLET | Refills: 0 | Status: SHIPPED | OUTPATIENT
Start: 2020-12-07 | End: 2020-12-31 | Stop reason: ALTCHOICE

## 2020-12-07 NOTE — TELEPHONE ENCOUNTER
"----- Message from Ivis Whitaker MA sent at 12/7/2020 10:23 AM EST -----  Can you rf his armour thyroid 65 please  ----- Message -----  From: Regina Landis MD  Sent: 12/4/2020   5:23 PM EST  To: Ivis Whitaker MA    I can not find nature thyroid available in that dosing. Please have pharmacy send rx request and I can then renew.   Thank you.   ----- Message -----  From: Ivis Whitaker MA  Sent: 12/4/2020  12:30 PM EST  To: Regina Landis MD    Spoke with his wife and she said nature thyroid 90. ( changed by a dr in [place] he use to see)   ----- Message -----  From: Regina Landis MD  Sent: 12/4/2020  12:01 PM EST  To: Ivis Whitaker MA    Med clarification  Listed in med list is armour thyroid 65 last rx 2016  Request for \"nature thyroid 90 grains\". Please clarify what thyroid med patient is taking and is he receiving rx from this office?           "

## 2020-12-28 ENCOUNTER — TELEPHONE (OUTPATIENT)
Dept: INTERNAL MEDICINE | Facility: CLINIC | Age: 65
End: 2020-12-28

## 2020-12-28 RX ORDER — THYROID, PORCINE 60 MG/1
65 TABLET ORAL DAILY
Qty: 90 TABLET | Refills: 1 | Status: SHIPPED | OUTPATIENT
Start: 2020-12-28 | End: 2020-12-31 | Stop reason: ALTCHOICE

## 2020-12-28 NOTE — TELEPHONE ENCOUNTER
PATIENT IS CALLING NEED A CALL BACK FROM JOSETTE      PATIENT IS CALLING ABOUT MEDICATION thyroid (ARMOUR) 65 MG tablet    PATIENT PHARMACY SAID THEY NEVER RECEIVED THE THE ORDER FOR THE MEDICATION        PLEASE CONTACT PATIENT @[phone]

## 2020-12-31 DIAGNOSIS — E03.9 HYPOTHYROIDISM, UNSPECIFIED TYPE: Primary | ICD-10-CM

## 2020-12-31 RX ORDER — LEVOTHYROXINE SODIUM 0.05 MG/1
50 TABLET ORAL DAILY
Qty: 30 TABLET | Refills: 3 | Status: SHIPPED | OUTPATIENT
Start: 2020-12-31 | End: 2021-03-16

## 2020-12-31 NOTE — TELEPHONE ENCOUNTER
Patient states that [facility] cannot get the NATURE Thyroid but they can get NT thyroid.  Can this be switched?    Patient call back # [phone]    Pharmacy: [id number] - [facility] [address] AT [facility] [phone] [facility] [phone]

## 2021-03-08 ENCOUNTER — TELEPHONE (OUTPATIENT)
Dept: INTERNAL MEDICINE | Facility: CLINIC | Age: 66
End: 2021-03-08

## 2021-03-08 ENCOUNTER — DOCUMENTATION (OUTPATIENT)
Dept: INTERNAL MEDICINE | Facility: CLINIC | Age: 66
End: 2021-03-08

## 2021-03-08 DIAGNOSIS — E03.9 HYPOTHYROIDISM, UNSPECIFIED TYPE: Primary | ICD-10-CM

## 2021-03-08 NOTE — TELEPHONE ENCOUNTER
"Spoke with pt. He states his insurance changed him from Nature Thyroid to levothyroxine. He is now feeling cold all the time. He is asking if he should change back to Nature Thyroid.  In your office note 11/23/2020 states she gets that from \"homeopathic practitioner\"  "

## 2021-03-08 NOTE — TELEPHONE ENCOUNTER
PATIENT CALLED AND STATED HIS INSURANCE CHANGED MEDICATION FOR THYROID. CURRENTLY ON levothyroxine (Synthroid) 50 MCG tablet PATIENT WAS CONCERNED AND WANTING TO KNOW IF PCP WOULD WANT TO DO ANOTHER TEST. OR CHANGE BACK TO ORIGINAL  MEDICATION.     CALLBACK NUMBER -[phone] (

## 2021-03-08 NOTE — PROGRESS NOTES
Patient feeling cold after switching from nature thyroid to levothyroxine (insurance dictims). Orders are in the chart for labs.advised patient I  would like to see his thyroid levels before making any medication changes.   jw

## 2021-03-11 ENCOUNTER — TELEPHONE (OUTPATIENT)
Dept: INTERNAL MEDICINE | Facility: CLINIC | Age: 66
End: 2021-03-11

## 2021-03-11 LAB
BASOPHILS # BLD AUTO: 0.08 10*3/MM3 (ref 0–0.2)
BASOPHILS NFR BLD AUTO: 1.4 % (ref 0–1.5)
BUN SERPL-MCNC: 19 MG/DL (ref 8–23)
BUN/CREAT SERPL: 20.2 (ref 7–25)
CALCIUM SERPL-MCNC: 9.6 MG/DL (ref 8.6–10.5)
CHLORIDE SERPL-SCNC: 103 MMOL/L (ref 98–107)
CO2 SERPL-SCNC: 23.7 MMOL/L (ref 22–29)
CREAT SERPL-MCNC: 0.94 MG/DL (ref 0.76–1.27)
EOSINOPHIL # BLD AUTO: 0.22 10*3/MM3 (ref 0–0.4)
EOSINOPHIL NFR BLD AUTO: 3.8 % (ref 0.3–6.2)
ERYTHROCYTE [DISTWIDTH] IN BLOOD BY AUTOMATED COUNT: 12.9 % (ref 12.3–15.4)
GLUCOSE SERPL-MCNC: 114 MG/DL (ref 65–99)
HCT VFR BLD AUTO: 46.3 % (ref 37.5–51)
HGB BLD-MCNC: 15.5 G/DL (ref 13–17.7)
IMM GRANULOCYTES # BLD AUTO: 0.01 10*3/MM3 (ref 0–0.05)
IMM GRANULOCYTES NFR BLD AUTO: 0.2 % (ref 0–0.5)
LYMPHOCYTES # BLD AUTO: 1.18 10*3/MM3 (ref 0.7–3.1)
LYMPHOCYTES NFR BLD AUTO: 20.6 % (ref 19.6–45.3)
MCH RBC QN AUTO: 30.3 PG (ref 26.6–33)
MCHC RBC AUTO-ENTMCNC: 33.5 G/DL (ref 31.5–35.7)
MCV RBC AUTO: 90.6 FL (ref 79–97)
MONOCYTES # BLD AUTO: 0.44 10*3/MM3 (ref 0.1–0.9)
MONOCYTES NFR BLD AUTO: 7.7 % (ref 5–12)
NEUTROPHILS # BLD AUTO: 3.8 10*3/MM3 (ref 1.7–7)
NEUTROPHILS NFR BLD AUTO: 66.3 % (ref 42.7–76)
NRBC BLD AUTO-RTO: 0 /100 WBC (ref 0–0.2)
PLATELET # BLD AUTO: 275 10*3/MM3 (ref 140–450)
POTASSIUM SERPL-SCNC: 4.3 MMOL/L (ref 3.5–5.2)
RBC # BLD AUTO: 5.11 10*6/MM3 (ref 4.14–5.8)
SODIUM SERPL-SCNC: 138 MMOL/L (ref 136–145)
T3FREE SERPL-MCNC: 3 PG/ML (ref 2–4.4)
T4 FREE SERPL-MCNC: 1.42 NG/DL (ref 0.93–1.7)
TSH SERPL DL<=0.005 MIU/L-ACNC: 1.84 UIU/ML (ref 0.27–4.2)
WBC # BLD AUTO: 5.73 10*3/MM3 (ref 3.4–10.8)

## 2021-03-11 NOTE — TELEPHONE ENCOUNTER
Pt informed and understands      ----- Message from Regina Landis MD sent at 3/11/2021  9:02 AM EST -----  Please advise patient that his labs are normal w/ exception of glucose which is a little elevated. He should reduce dietary carbohydrates. Would increase cardiovascular activity as well. This should also help his sensation of being cold. His thyroid is normal and in a very healthy range. No need to change medications at this time.

## 2021-03-15 RX ORDER — TRAZODONE HYDROCHLORIDE 50 MG/1
TABLET ORAL
Qty: 90 TABLET | Refills: 1 | Status: SHIPPED | OUTPATIENT
Start: 2021-03-15 | End: 2021-08-10

## 2021-03-16 RX ORDER — LEVOTHYROXINE SODIUM 0.05 MG/1
TABLET ORAL
Qty: 90 TABLET | Refills: 1 | Status: SHIPPED | OUTPATIENT
Start: 2021-03-16 | End: 2021-09-17

## 2021-05-04 ENCOUNTER — TELEPHONE (OUTPATIENT)
Dept: INTERNAL MEDICINE | Facility: CLINIC | Age: 66
End: 2021-05-04

## 2021-05-04 NOTE — TELEPHONE ENCOUNTER
PATIENT CALLING IN REGARDS TO SPEAK WITH DR OMALLEY'S ASSISTANT ABOUT SOME OF HIS MEDICATION THAT HE IS TAKING THAT IS NOT PRESCRIBED. PLEASE ADVISE, THANK YOU!

## 2021-05-10 ENCOUNTER — OFFICE VISIT (OUTPATIENT)
Dept: INTERNAL MEDICINE | Facility: CLINIC | Age: 66
End: 2021-05-10

## 2021-05-10 VITALS
HEIGHT: 72 IN | WEIGHT: 218 LBS | DIASTOLIC BLOOD PRESSURE: 65 MMHG | BODY MASS INDEX: 29.53 KG/M2 | SYSTOLIC BLOOD PRESSURE: 138 MMHG | HEART RATE: 82 BPM

## 2021-05-10 DIAGNOSIS — F98.8 ATTENTION DEFICIT DISORDER (ADD) WITHOUT HYPERACTIVITY: Primary | ICD-10-CM

## 2021-05-10 DIAGNOSIS — R03.0 ELEVATED BLOOD PRESSURE READING: ICD-10-CM

## 2021-05-10 PROCEDURE — 99214 OFFICE O/P EST MOD 30 MIN: CPT | Performed by: INTERNAL MEDICINE

## 2021-05-10 NOTE — PROGRESS NOTES
Chief Complaint   Patient presents with   • focus issues       History of Present Illness   Girish Greenberg is a 65 y.o. male presents for acute care. Patient reports a new occupation. He has now noticed that his focus is very compromised for the computer tasks necessary. He reports a long term difficulty w/ focus. He is dyslexic and struggled even in grade school with learning. He now has to use a computer or lap top on job site.       The following portions of the patient's history were reviewed and updated as appropriate: allergies, current medications, past family history, past medical history, past social history, past surgical history and problem list.  Current Outpatient Medications on File Prior to Visit   Medication Sig Dispense Refill   • albuterol (PROVENTIL HFA;VENTOLIN HFA) 108 (90 BASE) MCG/ACT inhaler Inhale 2 puffs Every 6 (Six) Hours As Needed for shortness of air.     • Ascorbic Acid (VITAMIN C PO) Take 1 tablet by mouth Daily.     • budesonide-formoterol (SYMBICORT) 160-4.5 MCG/ACT inhaler Inhale 2 puffs Daily.     • CANASA 1000 MG suppository UNWRAP AND INSERT 1 SUPPOSITORY INTO THE RECTUM EVERY NIGHT. 30 suppository 0   • Cholecalciferol (VITAMIN D PO) Take 1 capsule by mouth Daily.     • Coenzyme Q10 (CO Q 10 PO) Take 1 capsule by mouth Daily.     • Cyanocobalamin (B-12 PO) Take 1 capsule by mouth Daily.     • Green Tea, Camillia sinensis, (GREEN TEA PO) Take 1 tablet by mouth Daily.     • levothyroxine (SYNTHROID, LEVOTHROID) 50 MCG tablet TAKE 1 TABLET BY MOUTH EVERY DAY 90 tablet 1   • Red Yeast Rice Extract (RED YEAST RICE PO) Take 1 capsule by mouth Daily.     • traZODone (DESYREL) 50 MG tablet TAKE 1 TABLET BY MOUTH EVERY DAY AT NIGHT 90 tablet 1   • TURMERIC CURCUMIN PO Take 1 capsule by mouth Daily.     • phenazopyridine (PYRIDIUM) 200 MG tablet Take 1 tablet by mouth 3 (Three) Times a Day As Needed for bladder spasms. 20 tablet 1   • pramoxine-hydrocortisone (ANALPRAM HC) 1-1 % cream  "Apply  topically 2 (Two) Times a Day As Needed for Itching, Irritation or Hemorrhoids. 28.4 g 2     Current Facility-Administered Medications on File Prior to Visit   Medication Dose Route Frequency Provider Last Rate Last Admin   • levalbuterol (XOPENEX) nebulizer solution 0.63 mg  0.63 mg Nebulization Q6H PRN Regina Landis MD   0.63 mg at 01/16/18 1537     Review of Systems   Constitutional: Negative.    HENT: Negative.    Eyes: Negative.    Respiratory: Negative.    Cardiovascular: Negative.    Gastrointestinal: Negative.    Endocrine: Negative.    Genitourinary: Negative.    Musculoskeletal: Negative.    Skin: Negative.    Allergic/Immunologic: Negative.    Neurological:        Focus challenge   Hematological: Negative.    Psychiatric/Behavioral: The patient is nervous/anxious.        Objective   Physical Exam  Vitals and nursing note reviewed.   Constitutional:       Appearance: He is well-developed.   HENT:      Head: Normocephalic and atraumatic.      Right Ear: External ear normal.      Left Ear: External ear normal.      Nose: Nose normal.   Eyes:      Pupils: Pupils are equal, round, and reactive to light.   Cardiovascular:      Rate and Rhythm: Normal rate and regular rhythm.      Heart sounds: Normal heart sounds.   Pulmonary:      Effort: Pulmonary effort is normal. No respiratory distress.      Breath sounds: Normal breath sounds.   Abdominal:      Palpations: Abdomen is soft.   Musculoskeletal:         General: Normal range of motion.      Cervical back: Neck supple.   Skin:     General: Skin is warm and dry.   Neurological:      Mental Status: He is alert and oriented to person, place, and time.   Psychiatric:         Behavior: Behavior normal.          /65   Pulse 82   Ht 182.9 cm (72\")   Wt 98.9 kg (218 lb)   BMI 29.57 kg/m²     Assessment/Plan   Diagnoses and all orders for this visit:    Attention deficit disorder (ADD) without hyperactivity    Elevated blood pressure " reading      Patient w/ attention challenges. He has had lifelong challenges with focus. sscores are elevated on adult ADHD-RS-IV scoring in severe range in several categories. He does have elevated bp today w/ mild peripheral edema. Will monitor bp this week and report readings next week. Encouraged hydration w/ water. He is to try isolating while trying to work aand focusing on one task at a time.

## 2021-06-08 ENCOUNTER — OFFICE VISIT (OUTPATIENT)
Dept: INTERNAL MEDICINE | Facility: CLINIC | Age: 66
End: 2021-06-08

## 2021-06-08 VITALS
HEIGHT: 72 IN | WEIGHT: 215 LBS | HEART RATE: 74 BPM | BODY MASS INDEX: 29.12 KG/M2 | DIASTOLIC BLOOD PRESSURE: 70 MMHG | SYSTOLIC BLOOD PRESSURE: 128 MMHG

## 2021-06-08 DIAGNOSIS — Z79.899 MEDICATION THERAPY CHANGED: ICD-10-CM

## 2021-06-08 DIAGNOSIS — F98.8 ATTENTION DEFICIT DISORDER (ADD) WITHOUT HYPERACTIVITY: Primary | ICD-10-CM

## 2021-06-08 DIAGNOSIS — F41.9 ANXIOUS MOOD: ICD-10-CM

## 2021-06-08 DIAGNOSIS — R03.0 ELEVATED BLOOD PRESSURE READING: ICD-10-CM

## 2021-06-08 PROCEDURE — 93000 ELECTROCARDIOGRAM COMPLETE: CPT | Performed by: INTERNAL MEDICINE

## 2021-06-08 PROCEDURE — 99214 OFFICE O/P EST MOD 30 MIN: CPT | Performed by: INTERNAL MEDICINE

## 2021-06-08 RX ORDER — ATOMOXETINE 25 MG/1
25 CAPSULE ORAL DAILY
Qty: 30 CAPSULE | Refills: 5 | Status: SHIPPED | OUTPATIENT
Start: 2021-06-08 | End: 2021-07-01 | Stop reason: DRUGHIGH

## 2021-06-08 NOTE — PROGRESS NOTES
"Chief Complaint   Patient presents with   • ADHD     add follow up   • Edema     elevated office bp       History of Present Illness   Girish Greenberg is a 65 y.o. male presents for follow up evaluation. Continues to struggle w/ focus in the work place. He tried work place modifications but this was not really possible. Unable to separate from distractions. He had elevated bp in office last visit and on arrival today. He notes some swelling in the legs. Declines depressed mood but does feel anxious at times. Has a service dog that helps w/ this. He is also drinking a beer after work most days \"to calm down and relax\".              The following portions of the patient's history were reviewed and updated as appropriate: allergies, current medications, past family history, past medical history, past social history, past surgical history and problem list.  Current Outpatient Medications on File Prior to Visit   Medication Sig Dispense Refill   • albuterol (PROVENTIL HFA;VENTOLIN HFA) 108 (90 BASE) MCG/ACT inhaler Inhale 2 puffs Every 6 (Six) Hours As Needed for shortness of air.     • Ascorbic Acid (VITAMIN C PO) Take 1 tablet by mouth Daily.     • budesonide-formoterol (SYMBICORT) 160-4.5 MCG/ACT inhaler Inhale 2 puffs Daily.     • CANASA 1000 MG suppository UNWRAP AND INSERT 1 SUPPOSITORY INTO THE RECTUM EVERY NIGHT. 30 suppository 0   • Cholecalciferol (VITAMIN D PO) Take 1 capsule by mouth Daily.     • Coenzyme Q10 (CO Q 10 PO) Take 1 capsule by mouth Daily.     • Cyanocobalamin (B-12 PO) Take 1 capsule by mouth Daily.     • Green Tea, Camillia sinensis, (GREEN TEA PO) Take 1 tablet by mouth Daily.     • levothyroxine (SYNTHROID, LEVOTHROID) 50 MCG tablet TAKE 1 TABLET BY MOUTH EVERY DAY 90 tablet 1   • pramoxine-hydrocortisone (ANALPRAM HC) 1-1 % cream Apply  topically 2 (Two) Times a Day As Needed for Itching, Irritation or Hemorrhoids. 28.4 g 2   • Red Yeast Rice Extract (RED YEAST RICE PO) Take 1 capsule by mouth " Daily.     • traZODone (DESYREL) 50 MG tablet TAKE 1 TABLET BY MOUTH EVERY DAY AT NIGHT 90 tablet 1   • TURMERIC CURCUMIN PO Take 1 capsule by mouth Daily.     • phenazopyridine (PYRIDIUM) 200 MG tablet Take 1 tablet by mouth 3 (Three) Times a Day As Needed for bladder spasms. 20 tablet 1     Current Facility-Administered Medications on File Prior to Visit   Medication Dose Route Frequency Provider Last Rate Last Admin   • levalbuterol (XOPENEX) nebulizer solution 0.63 mg  0.63 mg Nebulization Q6H PRN Regina Landis MD   0.63 mg at 01/16/18 1537     Review of Systems   Constitutional: Negative.    HENT: Negative.    Eyes: Negative.    Respiratory: Negative.    Cardiovascular: Positive for leg swelling.   Gastrointestinal: Negative.    Endocrine: Negative.    Genitourinary: Positive for frequency.   Musculoskeletal: Negative.    Skin: Negative.    Allergic/Immunologic: Negative.    Neurological: Negative.    Hematological: Negative.    Psychiatric/Behavioral: The patient is nervous/anxious.        Objective   Physical Exam  Vitals and nursing note reviewed.   Constitutional:       Appearance: Normal appearance.   HENT:      Head: Normocephalic and atraumatic.      Right Ear: Tympanic membrane normal.      Left Ear: Tympanic membrane normal.      Nose: Nose normal.      Mouth/Throat:      Mouth: Mucous membranes are moist.   Eyes:      Extraocular Movements: Extraocular movements intact.      Pupils: Pupils are equal, round, and reactive to light.   Cardiovascular:      Rate and Rhythm: Normal rate and regular rhythm.      Pulses: Normal pulses.      Heart sounds: Normal heart sounds.   Pulmonary:      Effort: Pulmonary effort is normal.      Breath sounds: Normal breath sounds.   Abdominal:      General: Abdomen is flat.      Palpations: Abdomen is soft.   Musculoskeletal:         General: Normal range of motion.      Cervical back: Normal range of motion.   Skin:     General: Skin is warm and dry.   Neurological:  "     General: No focal deficit present.      Mental Status: He is alert and oriented to person, place, and time.   Psychiatric:         Mood and Affect: Mood normal.         Behavior: Behavior normal.         Thought Content: Thought content normal.         Judgment: Judgment normal.        EKG for elevated bp/ med management. Sinus non spec  changes. Unchanged from prior.     /70   Pulse 74   Ht 182.9 cm (72\")   Wt 97.5 kg (215 lb)   BMI 29.16 kg/m²     Assessment/Plan   Diagnoses and all orders for this visit:    Attention deficit disorder (ADD) without hyperactivity    Anxious mood    Other orders  -     atomoxetine (Strattera) 25 MG capsule; Take 1 capsule by mouth Daily.        Patient w/ ADD. Will try very low dose strattera to see if gets focus benefits. Will monitor bp while taking. He is normotensive today and has been at home. Sodium avoidance. Hydrate well with water. Advised to try meditation in place of beer in the evening. Or going for a walk or other stress reliever. Will f/u in 2 months or prn.          "

## 2021-07-01 ENCOUNTER — TELEPHONE (OUTPATIENT)
Dept: INTERNAL MEDICINE | Facility: CLINIC | Age: 66
End: 2021-07-01

## 2021-07-01 RX ORDER — ATOMOXETINE 40 MG/1
40 CAPSULE ORAL DAILY
Qty: 30 CAPSULE | Refills: 5 | Status: SHIPPED | OUTPATIENT
Start: 2021-07-01 | End: 2021-07-29 | Stop reason: SDUPTHER

## 2021-07-01 NOTE — TELEPHONE ENCOUNTER
Caller: Girish Greenberg    Relationship: Self    Best call back number: [phone]    What medication are you requesting: NEW MEDICATION FOR ADD  What are your current symptoms: IT'S NOT TAKING CARE OF HIS SYMPTOMS.  PLEASE ADVISE IF YOU ARE ABLE TO UP HIS MEDICATION.  PATIENT.  PATIENT ISN'T SURE OF THE NAME OF THIS MEDICATION.    If a prescription is needed, what is your preferred pharmacy and phone number: [facility]Y #[id number] - [place], [place] - [address] [address] AT [facility] [phone] [facility] [phone]

## 2021-07-25 NOTE — TELEPHONE ENCOUNTER
/  
All 3 of them are in the gut area  
Called patient and patient is scheduled after C-Scope. However, patient would like Dr. Landis to refer him and put in a referral for a hernia surgeon. Which doctor does she recommend and could Dr. Landis put in the referral.  
Can you move this please  
He has office visit scheduled with Morena on 8/10 and colonoscopy on 8/30 he wants to see her after colonoscopy and morena a referral to hernia surgeon.   
I will look at it during his office visit and then refer if needed.   
Ok to move appointment to week after cscope  
Pt informed  
Would like a referral for a hernia  
Yes I can put in a referral. Where is the hernia located?   
weakness

## 2021-07-29 RX ORDER — ATOMOXETINE 40 MG/1
40 CAPSULE ORAL DAILY
Qty: 30 CAPSULE | Refills: 5 | Status: SHIPPED | OUTPATIENT
Start: 2021-07-29 | End: 2021-08-10

## 2021-08-10 ENCOUNTER — OFFICE VISIT (OUTPATIENT)
Dept: INTERNAL MEDICINE | Facility: CLINIC | Age: 66
End: 2021-08-10

## 2021-08-10 VITALS
DIASTOLIC BLOOD PRESSURE: 76 MMHG | BODY MASS INDEX: 28.99 KG/M2 | HEIGHT: 72 IN | WEIGHT: 214 LBS | SYSTOLIC BLOOD PRESSURE: 122 MMHG | HEART RATE: 80 BPM

## 2021-08-10 DIAGNOSIS — F98.8 ATTENTION DEFICIT DISORDER (ADD) WITHOUT HYPERACTIVITY: Primary | ICD-10-CM

## 2021-08-10 DIAGNOSIS — L21.0 PITYRIASIS: ICD-10-CM

## 2021-08-10 DIAGNOSIS — G47.00 INSOMNIA, UNSPECIFIED TYPE: ICD-10-CM

## 2021-08-10 PROCEDURE — 99214 OFFICE O/P EST MOD 30 MIN: CPT | Performed by: INTERNAL MEDICINE

## 2021-08-10 RX ORDER — DEXTROAMPHETAMINE SACCHARATE, AMPHETAMINE ASPARTATE MONOHYDRATE, DEXTROAMPHETAMINE SULFATE AND AMPHETAMINE SULFATE 5; 5; 5; 5 MG/1; MG/1; MG/1; MG/1
20 CAPSULE, EXTENDED RELEASE ORAL EVERY MORNING
Qty: 30 CAPSULE | Refills: 0 | Status: SHIPPED | OUTPATIENT
Start: 2021-08-10 | End: 2021-08-31 | Stop reason: DRUGHIGH

## 2021-08-10 RX ORDER — KETOCONAZOLE 20 MG/ML
SHAMPOO TOPICAL 2 TIMES WEEKLY
Qty: 120 ML | Refills: 0 | Status: SHIPPED | OUTPATIENT
Start: 2021-08-12 | End: 2022-02-11

## 2021-08-10 RX ORDER — DOXEPIN HYDROCHLORIDE 6 MG/1
6 TABLET ORAL NIGHTLY
Qty: 30 TABLET | Refills: 3 | Status: ON HOLD | OUTPATIENT
Start: 2021-08-10 | End: 2021-10-26

## 2021-08-10 NOTE — PROGRESS NOTES
"Chief Complaint   Patient presents with   • ADHD   • Insomnia       History of Present Illness   Girish Greenberg is a 65 y.o. male presents for follow up evaluation. He is having difficulty w/ focus. Notes that if he has to work at a task for more than 15 min then he \"gets so antsy he has to get up\". He tried straterra w/ no real benefit. He is having some difficulty w/ sleep. Falls asleep but then wakes with sensation of need to urinate. S/p prostatectomy and xrt. Now has some urgency and dribble.     The following portions of the patient's history were reviewed and updated as appropriate: allergies, current medications, past family history, past medical history, past social history, past surgical history and problem list.  Current Outpatient Medications on File Prior to Visit   Medication Sig Dispense Refill   • albuterol (PROVENTIL HFA;VENTOLIN HFA) 108 (90 BASE) MCG/ACT inhaler Inhale 2 puffs Every 6 (Six) Hours As Needed for shortness of air.     • Ascorbic Acid (VITAMIN C PO) Take 1 tablet by mouth Daily.     • budesonide-formoterol (SYMBICORT) 160-4.5 MCG/ACT inhaler Inhale 2 puffs Daily.     • CANASA 1000 MG suppository UNWRAP AND INSERT 1 SUPPOSITORY INTO THE RECTUM EVERY NIGHT. 30 suppository 0   • Cholecalciferol (VITAMIN D PO) Take 1 capsule by mouth Daily.     • Coenzyme Q10 (CO Q 10 PO) Take 1 capsule by mouth Daily.     • Cyanocobalamin (B-12 PO) Take 1 capsule by mouth Daily.     • Green Tea, Camillia sinensis, (GREEN TEA PO) Take 1 tablet by mouth Daily.     • levothyroxine (SYNTHROID, LEVOTHROID) 50 MCG tablet TAKE 1 TABLET BY MOUTH EVERY DAY 90 tablet 1   • Red Yeast Rice Extract (RED YEAST RICE PO) Take 1 capsule by mouth Daily.     • TURMERIC CURCUMIN PO Take 1 capsule by mouth Daily.     • pramoxine-hydrocortisone (ANALPRAM HC) 1-1 % cream Apply  topically 2 (Two) Times a Day As Needed for Itching, Irritation or Hemorrhoids. 28.4 g 2   • [DISCONTINUED] atomoxetine (Strattera) 40 MG capsule Take 1 " capsule by mouth Daily. 30 capsule 5   • [DISCONTINUED] phenazopyridine (PYRIDIUM) 200 MG tablet Take 1 tablet by mouth 3 (Three) Times a Day As Needed for bladder spasms. 20 tablet 1   • [DISCONTINUED] traZODone (DESYREL) 50 MG tablet TAKE 1 TABLET BY MOUTH EVERY DAY AT NIGHT 90 tablet 1     Current Facility-Administered Medications on File Prior to Visit   Medication Dose Route Frequency Provider Last Rate Last Admin   • levalbuterol (XOPENEX) nebulizer solution 0.63 mg  0.63 mg Nebulization Q6H PRN Regina Landis MD   0.63 mg at 01/16/18 1537     Review of Systems   Constitutional: Negative.    HENT: Negative.    Eyes: Negative.    Respiratory: Negative.    Cardiovascular: Negative.    Gastrointestinal: Negative.    Endocrine: Negative.    Genitourinary: Negative.    Musculoskeletal: Negative.    Skin:        Concern of skin spots on trunk     Allergic/Immunologic: Negative.    Neurological: Negative.    Hematological: Negative.    Psychiatric/Behavioral: Negative.        Objective   Physical Exam  Vitals and nursing note reviewed.   Constitutional:       Appearance: Normal appearance. He is well-developed.   HENT:      Head: Normocephalic and atraumatic.      Right Ear: Tympanic membrane and external ear normal.      Left Ear: Tympanic membrane and external ear normal.      Nose: Nose normal.      Mouth/Throat:      Mouth: Mucous membranes are moist.   Eyes:      Extraocular Movements: Extraocular movements intact.      Pupils: Pupils are equal, round, and reactive to light.   Cardiovascular:      Rate and Rhythm: Normal rate and regular rhythm.      Pulses: Normal pulses.      Heart sounds: Normal heart sounds.   Pulmonary:      Effort: Pulmonary effort is normal. No respiratory distress.      Breath sounds: Normal breath sounds.   Abdominal:      General: Abdomen is flat.      Palpations: Abdomen is soft.   Musculoskeletal:         General: Normal range of motion.      Cervical back: Normal range of motion  "and neck supple.   Skin:     General: Skin is warm and dry.      Comments: sk behind left ear and below left nipple  Pale pink macule on trunk x 2     Neurological:      General: No focal deficit present.      Mental Status: He is alert and oriented to person, place, and time.   Psychiatric:         Mood and Affect: Mood normal.         Behavior: Behavior normal.         Thought Content: Thought content normal.         Judgment: Judgment normal.          /76   Pulse 80   Ht 182.9 cm (72\")   Wt 97.1 kg (214 lb)   BMI 29.02 kg/m²     Assessment/Plan   Diagnoses and all orders for this visit:    Attention deficit disorder (ADD) without hyperactivity    Insomnia, unspecified type    Pityriasis    Other orders  -     amphetamine-dextroamphetamine XR (Adderall XR) 20 MG 24 hr capsule; Take 1 capsule by mouth Every Morning  -     ketoconazole (Nizoral) 2 % shampoo; Apply  topically to the appropriate area as directed 2 (Two) Times a Week.      Patient w/ ADD. Will try adderall xr daily. Continue to work on focus strategies. He will try doxepin 6 mg at night once comfortable with adderall. He is aware of the risks and benefits of the medication. Is aware that it is a controlled substance. Has pityriasis on his chest. Will try nizoral shampoo to the area.            "

## 2021-08-17 ENCOUNTER — TELEPHONE (OUTPATIENT)
Dept: INTERNAL MEDICINE | Facility: CLINIC | Age: 66
End: 2021-08-17

## 2021-08-17 NOTE — TELEPHONE ENCOUNTER
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

## 2021-08-30 ENCOUNTER — TELEPHONE (OUTPATIENT)
Dept: INTERNAL MEDICINE | Facility: CLINIC | Age: 66
End: 2021-08-30

## 2021-08-30 ENCOUNTER — TELEMEDICINE (OUTPATIENT)
Dept: INTERNAL MEDICINE | Facility: CLINIC | Age: 66
End: 2021-08-30

## 2021-08-30 DIAGNOSIS — Z20.822 EXPOSURE TO COVID-19 VIRUS: Primary | ICD-10-CM

## 2021-08-30 PROCEDURE — 99213 OFFICE O/P EST LOW 20 MIN: CPT | Performed by: NURSE PRACTITIONER

## 2021-08-30 NOTE — TELEPHONE ENCOUNTER
PATIENT OF DR. WATSON CALLED WANTING TO LEAVE A MESSAGE FOR JOSETTE ABOUT HIS MEDICATION     AMPHETAMINE SULF ER 20MG       HE SAID IT WORKS FOR A SHORT TIME AND HE WANTED TO KNOW IF THERE WAS ANOTHER OPTION FOR HIM OR TAKE TWICE DAILY?     PLEASE CALL AND ADVISE       Girish Greenberg (Self) [phone] (H)

## 2021-08-30 NOTE — PROGRESS NOTES
Subjective   Girish Greenberg is a 65 y.o. male.   Chief Complaint   Patient presents with   • Exposure To Known Illness   • Cough     3 days    You have chosen to receive care through a telehealth visit.  Do you consent to use a video/audio connection for your medical care today? Yes    There were no vitals filed for this visit.  No LMP for male patient.    Girish is a 65 year old male patient of Dr Landis who is being seen via telemedicine for an acute visit. He was exposed to covid 19 possibly. His son who lives with him was recently diagnosed with covid 19 pneumonia. He denies fever, but has had cough, chest tightness and shortness of breath. He is fully vaccinated           The following portions of the patient's history were reviewed and updated as appropriate: allergies, current medications, past family history, past medical history, past social history, past surgical history and problem list.    Review of Systems   Constitutional: Negative for chills and fever.   Respiratory: Positive for cough, chest tightness and shortness of breath. Negative for wheezing.    Cardiovascular: Negative for chest pain.   Musculoskeletal: Positive for arthralgias.       Objective   Physical Exam  Constitutional:       General: He is not in acute distress.     Appearance: Normal appearance. He is well-developed and well-groomed.   HENT:      Head: Normocephalic.      Right Ear: Tympanic membrane and ear canal normal.      Left Ear: Tympanic membrane and ear canal normal.   Eyes:      General: Lids are normal.   Cardiovascular:      Rate and Rhythm: Normal rate and regular rhythm.      Heart sounds: Normal heart sounds.   Pulmonary:      Effort: Pulmonary effort is normal.      Breath sounds: Normal breath sounds.   Skin:     General: Skin is warm and dry.   Neurological:      Mental Status: He is alert and oriented to person, place, and time.   Psychiatric:         Attention and Perception: Attention normal.         Mood and Affect:  Mood normal.         Assessment/Plan   Diagnoses and all orders for this visit:    1. Exposure to COVID-19 virus (Primary)  -     COVID-19,[facility] ROUTINE, NP/OP SWAB IN TRANSPORT MEDIA OR ESWAB 72 HR TAT - Swab, Anterior nasal      Will get a covid 19 test and call with results  Rest and drink plenty of fluids  Continue current medications, discussed monoclonal antibodies if positive  Monitor o2 sat at home  Follow up if symptoms persist, worsen or if new symptoms develop  Advised to go to the ER for cp or worsening SOA   telehealth duration and discussion with patient 10 minutes

## 2021-08-31 RX ORDER — DEXTROAMPHETAMINE SACCHARATE, AMPHETAMINE ASPARTATE MONOHYDRATE, DEXTROAMPHETAMINE SULFATE AND AMPHETAMINE SULFATE 7.5; 7.5; 7.5; 7.5 MG/1; MG/1; MG/1; MG/1
30 CAPSULE, EXTENDED RELEASE ORAL EVERY MORNING
Qty: 30 CAPSULE | Refills: 0 | Status: SHIPPED | OUTPATIENT
Start: 2021-08-31 | End: 2022-03-16

## 2021-08-31 NOTE — TELEPHONE ENCOUNTER
Caller: Girish Greenberg    Relationship: Self    Best call back number: [phone]    What was the call regarding: THE PATIENT IS REQUESTING THAT HE GET A CALL BACK FROM JOSETTE TODAY. THE PATIENT ALSO STATES THAT HE RECEIVED A COVID-19 TEST YESTERDAY AND WOULD LIKE THE RESULTS TODAY AS WELL.     Do you require a callback: YES

## 2021-09-01 ENCOUNTER — TELEPHONE (OUTPATIENT)
Dept: INTERNAL MEDICINE | Facility: CLINIC | Age: 66
End: 2021-09-01

## 2021-09-01 LAB — SARS-COV-2 RNA RESP QL NAA+PROBE: NOT DETECTED

## 2021-09-01 NOTE — TELEPHONE ENCOUNTER
----- Message from DIONE Scales sent at 9/1/2021  7:50 AM EDT -----  Please notify of negative result

## 2021-09-02 ENCOUNTER — TELEPHONE (OUTPATIENT)
Dept: INTERNAL MEDICINE | Facility: CLINIC | Age: 66
End: 2021-09-02

## 2021-09-02 NOTE — TELEPHONE ENCOUNTER
DELETE AFTER REVIEWING: Telephone encounter to be sent to the clinical pool     Caller: Girish Greenberg    Relationship to patient: Self    Best call back number: [phone]    Date of exposure: UNKNOWN    Date of positive COVID19 test: N/A NEGATIVE TEST YESTERDAY RAPID    Date if possible COVID19 exposure: Monday AUG 30    COVID19 symptoms: UPSET STOMACH, SLIGHT HEADACHE, CONGESTED, SHORTNESS OF BREATH    Date of initial quarantine:    Additional information or concerns: PATIENT IS CONCERNED THAT HE KEEPS SHOWING SYMPTOMS BUT HIS TESTS HAVE COME BACK NEGATIVE.  PATIENT WOULD LIKE TO HAVE  OR MA RETURN HIS CALL HE HAS MANY MORE QUESTIONS.

## 2021-09-10 ENCOUNTER — OFFICE VISIT (OUTPATIENT)
Dept: INTERNAL MEDICINE | Facility: CLINIC | Age: 66
End: 2021-09-10

## 2021-09-10 VITALS
HEIGHT: 72 IN | HEART RATE: 82 BPM | DIASTOLIC BLOOD PRESSURE: 66 MMHG | SYSTOLIC BLOOD PRESSURE: 120 MMHG | WEIGHT: 215 LBS | BODY MASS INDEX: 29.12 KG/M2

## 2021-09-10 DIAGNOSIS — M54.50 ACUTE RIGHT-SIDED LOW BACK PAIN WITHOUT SCIATICA: Primary | ICD-10-CM

## 2021-09-10 DIAGNOSIS — Z12.11 COLON CANCER SCREENING: ICD-10-CM

## 2021-09-10 DIAGNOSIS — F98.8 ATTENTION DEFICIT DISORDER (ADD) WITHOUT HYPERACTIVITY: ICD-10-CM

## 2021-09-10 PROCEDURE — 99214 OFFICE O/P EST MOD 30 MIN: CPT | Performed by: INTERNAL MEDICINE

## 2021-09-10 RX ORDER — VALACYCLOVIR HYDROCHLORIDE 1 G/1
1000 TABLET, FILM COATED ORAL 2 TIMES DAILY
Qty: 8 TABLET | Refills: 3 | Status: SHIPPED | OUTPATIENT
Start: 2021-09-10 | End: 2022-05-23

## 2021-09-10 RX ORDER — CYCLOBENZAPRINE HCL 10 MG
10 TABLET ORAL 3 TIMES DAILY PRN
Qty: 25 TABLET | Refills: 1 | Status: SHIPPED | OUTPATIENT
Start: 2021-09-10 | End: 2022-02-11

## 2021-09-10 RX ORDER — PREDNISONE 10 MG/1
30 TABLET ORAL DAILY
Qty: 9 TABLET | Refills: 0 | Status: ON HOLD | OUTPATIENT
Start: 2021-09-10 | End: 2021-10-26

## 2021-09-10 NOTE — PROGRESS NOTES
"Chief Complaint   Patient presents with   • ADHD   • Back Pain   • Mouth Lesions     feverblister       History of Present Illness   Girish Greenberg is a 66 y.o. male presents for follow up evaluation. He is doing well today. Wife w/ covid but he has not had any symptoms. Vaccinated in march and April. He has hypothyroidism. Euthyroid on synthroid. Patients chief complaint is of back pain. Heavy lifting at work one week ago. He tried tylenol alt w/ ibuprofen for about 2-3 days w/ minimal benefit. Has just been working from home since that time due to discomfort.   Patient diagnosed w/ ADD. He notes that focus is \"a lot better\" on current medication. \"i'm satisfied with it\". He can focus for a couple hour stretch in front of the computer. He does get about 6 hour of focus w the medication.             The following portions of the patient's history were reviewed and updated as appropriate: allergies, current medications, past family history, past medical history, past social history, past surgical history and problem list.  Current Outpatient Medications on File Prior to Visit   Medication Sig Dispense Refill   • albuterol (PROVENTIL HFA;VENTOLIN HFA) 108 (90 BASE) MCG/ACT inhaler Inhale 2 puffs Every 6 (Six) Hours As Needed for shortness of air.     • amphetamine-dextroamphetamine XR (Adderall XR) 30 MG 24 hr capsule Take 1 capsule by mouth Every Morning 30 capsule 0   • Ascorbic Acid (VITAMIN C PO) Take 1 tablet by mouth Daily.     • budesonide-formoterol (SYMBICORT) 160-4.5 MCG/ACT inhaler Inhale 2 puffs Daily.     • CANASA 1000 MG suppository UNWRAP AND INSERT 1 SUPPOSITORY INTO THE RECTUM EVERY NIGHT. 30 suppository 0   • Cholecalciferol (VITAMIN D PO) Take 1 capsule by mouth Daily.     • Coenzyme Q10 (CO Q 10 PO) Take 1 capsule by mouth Daily.     • Cyanocobalamin (B-12 PO) Take 1 capsule by mouth Daily.     • Doxepin HCl 6 MG tablet Take 6 mg by mouth Every Night. 30 tablet 3   • Green Tea, Camillia sinensis, " (GREEN TEA PO) Take 1 tablet by mouth Daily.     • ketoconazole (Nizoral) 2 % shampoo Apply  topically to the appropriate area as directed 2 (Two) Times a Week. 120 mL 0   • levothyroxine (SYNTHROID, LEVOTHROID) 50 MCG tablet TAKE 1 TABLET BY MOUTH EVERY DAY 90 tablet 1   • pramoxine-hydrocortisone (ANALPRAM HC) 1-1 % cream Apply  topically 2 (Two) Times a Day As Needed for Itching, Irritation or Hemorrhoids. 28.4 g 2   • Red Yeast Rice Extract (RED YEAST RICE PO) Take 1 capsule by mouth Daily.     • TURMERIC CURCUMIN PO Take 1 capsule by mouth Daily.       Current Facility-Administered Medications on File Prior to Visit   Medication Dose Route Frequency Provider Last Rate Last Admin   • levalbuterol (XOPENEX) nebulizer solution 0.63 mg  0.63 mg Nebulization Q6H PRN Regina Landis MD   0.63 mg at 01/16/18 1537     Review of Systems   Constitutional: Negative.    HENT: Negative.    Eyes: Negative.    Respiratory: Negative.    Cardiovascular: Negative.    Gastrointestinal: Negative.    Endocrine: Negative.    Genitourinary: Negative.    Musculoskeletal: Negative.    Skin: Negative.    Allergic/Immunologic: Negative.    Neurological: Negative.    Hematological: Negative.    Psychiatric/Behavioral: Negative.        Objective   Physical Exam  Vitals and nursing note reviewed.   Constitutional:       Appearance: Normal appearance. He is well-developed.   HENT:      Head: Normocephalic and atraumatic.      Right Ear: Tympanic membrane and external ear normal.      Left Ear: Tympanic membrane and external ear normal.      Nose: Nose normal.      Mouth/Throat:      Mouth: Mucous membranes are moist.   Eyes:      Extraocular Movements: Extraocular movements intact.      Pupils: Pupils are equal, round, and reactive to light.   Cardiovascular:      Rate and Rhythm: Normal rate and regular rhythm.      Heart sounds: Normal heart sounds.   Pulmonary:      Effort: Pulmonary effort is normal. No respiratory distress.      Breath  "sounds: Normal breath sounds.   Abdominal:      General: Abdomen is flat.      Palpations: Abdomen is soft.   Musculoskeletal:         General: Normal range of motion.      Cervical back: Normal range of motion and neck supple.   Skin:     General: Skin is warm and dry.   Neurological:      General: No focal deficit present.      Mental Status: He is alert and oriented to person, place, and time.   Psychiatric:         Mood and Affect: Mood normal.         Behavior: Behavior normal.         Thought Content: Thought content normal.         Judgment: Judgment normal.          /66   Pulse 82   Ht 182.9 cm (72\")   Wt 97.5 kg (215 lb)   BMI 29.16 kg/m²     Assessment/Plan   Diagnoses and all orders for this visit:    Acute right-sided low back pain without sciatica  -     Ambulatory Referral to Physical Therapy Evaluate and treat    Colon cancer screening  -     Ambulatory Referral For Screening Colonoscopy    Attention deficit disorder (ADD) without hyperactivity    Other orders  -     cyclobenzaprine (FLEXERIL) 10 MG tablet; Take 1 tablet by mouth 3 (Three) Times a Day As Needed for Muscle Spasms.  -     predniSONE (DELTASONE) 10 MG tablet; Take 3 tablets by mouth Daily.  -     valACYclovir (Valtrex) 1000 MG tablet; Take 1 tablet by mouth 2 (Two) Times a Day.        Patient w/ acute low back pain. He is encouraged to avoid heavy lifting. He is to practice appropriate lifting techniques. He will take a short course of prednisone. To use flexeril prn. Strongly encouraged physical therapy. Gave hand out on back pain. Topical lidocaine.  To get cscope. To get prevnar in 2 weeks here or pharmacy. Patient has oral hsv. Sent valtrex to his pharmacy he is out of 72 hour window so will use this in the future. To use sunscreen on lips. Continue adderall for ADD/          "

## 2021-09-13 ENCOUNTER — HOSPITAL ENCOUNTER (OUTPATIENT)
Dept: PHYSICAL THERAPY | Facility: HOSPITAL | Age: 66
Setting detail: THERAPIES SERIES
Discharge: HOME | End: 2021-09-13

## 2021-09-13 DIAGNOSIS — M54.50 ACUTE RIGHT-SIDED LOW BACK PAIN WITHOUT SCIATICA: Primary | ICD-10-CM

## 2021-09-13 PROCEDURE — 97161 PT EVAL LOW COMPLEX 20 MIN: CPT
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
## 2021-09-15 ENCOUNTER — HOSPITAL ENCOUNTER (OUTPATIENT)
Dept: PHYSICAL THERAPY | Facility: HOSPITAL | Age: 66
Setting detail: THERAPIES SERIES
Discharge: HOME | End: 2021-09-15

## 2021-09-15 DIAGNOSIS — M54.50 ACUTE RIGHT-SIDED LOW BACK PAIN WITHOUT SCIATICA: Primary | ICD-10-CM

## 2021-09-15 PROCEDURE — 97110 THERAPEUTIC EXERCISES: CPT
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
## 2021-09-17 RX ORDER — LEVOTHYROXINE SODIUM 0.05 MG/1
TABLET ORAL
Qty: 90 TABLET | Refills: 1 | Status: SHIPPED | OUTPATIENT
Start: 2021-09-17 | End: 2022-03-24 | Stop reason: SDUPTHER

## 2021-09-21 ENCOUNTER — HOSPITAL ENCOUNTER (OUTPATIENT)
Dept: PHYSICAL THERAPY | Facility: HOSPITAL | Age: 66
Setting detail: THERAPIES SERIES
Discharge: HOME | End: 2021-09-21

## 2021-09-21 DIAGNOSIS — M54.50 ACUTE RIGHT-SIDED LOW BACK PAIN WITHOUT SCIATICA: Primary | ICD-10-CM

## 2021-09-21 PROCEDURE — 97110 THERAPEUTIC EXERCISES: CPT
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
## 2021-09-23 ENCOUNTER — HOSPITAL ENCOUNTER (OUTPATIENT)
Dept: PHYSICAL THERAPY | Facility: HOSPITAL | Age: 66
Setting detail: THERAPIES SERIES
Discharge: HOME | End: 2021-09-23

## 2021-09-23 DIAGNOSIS — M54.50 ACUTE RIGHT-SIDED LOW BACK PAIN WITHOUT SCIATICA: Primary | ICD-10-CM

## 2021-09-23 PROCEDURE — 97110 THERAPEUTIC EXERCISES: CPT
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
## 2021-09-27 ENCOUNTER — OFFICE VISIT (OUTPATIENT)
Dept: INTERNAL MEDICINE | Facility: CLINIC | Age: 66
End: 2021-09-27

## 2021-09-27 VITALS
DIASTOLIC BLOOD PRESSURE: 76 MMHG | WEIGHT: 213 LBS | HEIGHT: 72 IN | BODY MASS INDEX: 28.85 KG/M2 | SYSTOLIC BLOOD PRESSURE: 120 MMHG | HEART RATE: 76 BPM

## 2021-09-27 DIAGNOSIS — M47.27 OSTEOARTHRITIS OF SPINE WITH RADICULOPATHY, LUMBOSACRAL REGION: ICD-10-CM

## 2021-09-27 DIAGNOSIS — E03.9 HYPOTHYROIDISM, UNSPECIFIED TYPE: ICD-10-CM

## 2021-09-27 DIAGNOSIS — C61 PROSTATE CANCER (HCC): ICD-10-CM

## 2021-09-27 DIAGNOSIS — M54.50 LOW BACK PAIN, UNSPECIFIED BACK PAIN LATERALITY, UNSPECIFIED CHRONICITY, UNSPECIFIED WHETHER SCIATICA PRESENT: Primary | ICD-10-CM

## 2021-09-27 PROCEDURE — 90662 IIV NO PRSV INCREASED AG IM: CPT | Performed by: INTERNAL MEDICINE

## 2021-09-27 PROCEDURE — G0008 ADMIN INFLUENZA VIRUS VAC: HCPCS | Performed by: INTERNAL MEDICINE

## 2021-09-27 PROCEDURE — 96372 THER/PROPH/DIAG INJ SC/IM: CPT | Performed by: INTERNAL MEDICINE

## 2021-09-27 PROCEDURE — 90732 PPSV23 VACC 2 YRS+ SUBQ/IM: CPT | Performed by: INTERNAL MEDICINE

## 2021-09-27 PROCEDURE — 99214 OFFICE O/P EST MOD 30 MIN: CPT | Performed by: INTERNAL MEDICINE

## 2021-09-27 PROCEDURE — G0009 ADMIN PNEUMOCOCCAL VACCINE: HCPCS | Performed by: INTERNAL MEDICINE

## 2021-09-27 PROCEDURE — 72110 X-RAY EXAM L-2 SPINE 4/>VWS: CPT | Performed by: INTERNAL MEDICINE

## 2021-09-27 RX ORDER — KETOROLAC TROMETHAMINE 30 MG/ML
30 INJECTION, SOLUTION INTRAMUSCULAR; INTRAVENOUS ONCE
Status: COMPLETED | OUTPATIENT
Start: 2021-09-27 | End: 2021-09-27

## 2021-09-27 RX ORDER — MELOXICAM 15 MG/1
15 TABLET ORAL DAILY
Qty: 30 TABLET | Refills: 1 | Status: SHIPPED | OUTPATIENT
Start: 2021-09-27 | End: 2021-11-29

## 2021-09-27 RX ADMIN — KETOROLAC TROMETHAMINE 30 MG: 30 INJECTION, SOLUTION INTRAMUSCULAR; INTRAVENOUS at 09:23

## 2021-09-27 NOTE — PROGRESS NOTES
"Chief Complaint   Patient presents with   • Back Pain       History of Present Illness   Girish Greenberg is a 66 y.o. male presents for acute care. Patient reports back pain. Treated here in office w/  flexeril and prednisone w no real benefit. Went to physical therapy \"that really didn't do anything\". Pain is located low right side into the buttock. No pain when seated or lying down. Only experiencing pain when he is standing and trying to walk. Otherwise feeling well.       The following portions of the patient's history were reviewed and updated as appropriate: allergies, current medications, past family history, past medical history, past social history, past surgical history and problem list.  Current Outpatient Medications on File Prior to Visit   Medication Sig Dispense Refill   • albuterol (PROVENTIL HFA;VENTOLIN HFA) 108 (90 BASE) MCG/ACT inhaler Inhale 2 puffs Every 6 (Six) Hours As Needed for shortness of air.     • amphetamine-dextroamphetamine XR (Adderall XR) 30 MG 24 hr capsule Take 1 capsule by mouth Every Morning 30 capsule 0   • Ascorbic Acid (VITAMIN C PO) Take 1 tablet by mouth Daily.     • budesonide-formoterol (SYMBICORT) 160-4.5 MCG/ACT inhaler Inhale 2 puffs Daily.     • CANASA 1000 MG suppository UNWRAP AND INSERT 1 SUPPOSITORY INTO THE RECTUM EVERY NIGHT. 30 suppository 0   • Cholecalciferol (VITAMIN D PO) Take 1 capsule by mouth Daily.     • Coenzyme Q10 (CO Q 10 PO) Take 1 capsule by mouth Daily.     • Cyanocobalamin (B-12 PO) Take 1 capsule by mouth Daily.     • cyclobenzaprine (FLEXERIL) 10 MG tablet Take 1 tablet by mouth 3 (Three) Times a Day As Needed for Muscle Spasms. 25 tablet 1   • Doxepin HCl 6 MG tablet Take 6 mg by mouth Every Night. 30 tablet 3   • Green Tea, Camillia sinensis, (GREEN TEA PO) Take 1 tablet by mouth Daily.     • ketoconazole (Nizoral) 2 % shampoo Apply  topically to the appropriate area as directed 2 (Two) Times a Week. 120 mL 0   • levothyroxine (SYNTHROID, " LEVOTHROID) 50 MCG tablet TAKE 1 TABLET BY MOUTH EVERY DAY 90 tablet 1   • pramoxine-hydrocortisone (ANALPRAM HC) 1-1 % cream Apply  topically 2 (Two) Times a Day As Needed for Itching, Irritation or Hemorrhoids. 28.4 g 2   • predniSONE (DELTASONE) 10 MG tablet Take 3 tablets by mouth Daily. 9 tablet 0   • Red Yeast Rice Extract (RED YEAST RICE PO) Take 1 capsule by mouth Daily.     • TURMERIC CURCUMIN PO Take 1 capsule by mouth Daily.     • valACYclovir (Valtrex) 1000 MG tablet Take 1 tablet by mouth 2 (Two) Times a Day. 8 tablet 3     Current Facility-Administered Medications on File Prior to Visit   Medication Dose Route Frequency Provider Last Rate Last Admin   • levalbuterol (XOPENEX) nebulizer solution 0.63 mg  0.63 mg Nebulization Q6H PRN Regina Landis MD   0.63 mg at 01/16/18 1537     Review of Systems   Constitutional: Negative.    HENT: Negative.    Eyes: Negative.    Respiratory: Negative.    Cardiovascular: Negative.    Gastrointestinal: Negative.    Endocrine: Negative.    Genitourinary: Positive for frequency.        Increased freq after prostatectomy   Musculoskeletal: Positive for back pain.   Skin: Negative.    Allergic/Immunologic: Negative.    Hematological: Negative.    Psychiatric/Behavioral: Negative.        Objective   Physical Exam  Vitals and nursing note reviewed.   Constitutional:       Appearance: Normal appearance.   HENT:      Head: Normocephalic and atraumatic.      Right Ear: Tympanic membrane normal.      Left Ear: Tympanic membrane normal.      Nose: Nose normal.      Mouth/Throat:      Mouth: Mucous membranes are moist.   Eyes:      Extraocular Movements: Extraocular movements intact.      Pupils: Pupils are equal, round, and reactive to light.   Cardiovascular:      Rate and Rhythm: Normal rate and regular rhythm.      Pulses: Normal pulses.   Pulmonary:      Effort: Pulmonary effort is normal.      Breath sounds: Normal breath sounds.   Abdominal:      General: Abdomen is flat.  "     Palpations: Abdomen is soft.   Musculoskeletal:      Cervical back: Normal range of motion.      Comments: Point tenderness lumbosacral region     Skin:     General: Skin is warm and dry.   Neurological:      General: No focal deficit present.      Mental Status: He is alert and oriented to person, place, and time.   Psychiatric:         Mood and Affect: Mood normal.         Behavior: Behavior normal.         Thought Content: Thought content normal.         Judgment: Judgment normal.        Lspine XR w/ degenerative changes. displacemet L5 on S1.    /76   Pulse 76   Ht 182.9 cm (72\")   Wt 96.6 kg (213 lb)   BMI 28.89 kg/m²     Assessment/Plan   Diagnoses and all orders for this visit:    Low back pain, unspecified back pain laterality, unspecified chronicity, unspecified whether sciatica present  -     XR Spine Lumbar 4+ View (In Office)  -     Ambulatory Referral to Pain Management  -     CBC & Differential  -     Comprehensive Metabolic Panel  -     TSH    Osteoarthritis of spine with radiculopathy, lumbosacral region  -     CBC & Differential  -     Comprehensive Metabolic Panel  -     TSH    Prostate cancer (CMS/HCC)  -     CBC & Differential  -     Comprehensive Metabolic Panel  -     TSH    Hypothyroidism, unspecified type  -     TSH    Other orders  -     Pneumococcal Polysaccharide Vaccine 23-Valent Greater Than or Equal To 1yo Subcutaneous / IM  -     Fluzone High-Dose 65+yrs ([id number])          Patient w/ subacute back pain. Xray w/ shifting l4/5. Will give toradol x one today. D/c ibuprofen and start meloxicam once daily. He will use topical lidocaine daily. To pain management for possible injection. Will test listed labs as well given h/o hypothyroidism and prostate ca. Consider mri v ct scan (has stimulator for prostate. Close follow up.      "

## 2021-09-28 ENCOUNTER — APPOINTMENT (OUTPATIENT)
Dept: PHYSICAL THERAPY | Facility: HOSPITAL | Age: 66
End: 2021-09-28

## 2021-09-28 LAB
ALBUMIN SERPL-MCNC: 4.4 G/DL (ref 3.5–5.2)
ALBUMIN/GLOB SERPL: 2.3 G/DL
ALP SERPL-CCNC: 35 U/L (ref 39–117)
ALT SERPL-CCNC: 20 U/L (ref 1–41)
AST SERPL-CCNC: 19 U/L (ref 1–40)
BASOPHILS # BLD AUTO: 0.07 10*3/MM3 (ref 0–0.2)
BASOPHILS NFR BLD AUTO: 1.1 % (ref 0–1.5)
BILIRUB SERPL-MCNC: 0.4 MG/DL (ref 0–1.2)
BUN SERPL-MCNC: 25 MG/DL (ref 8–23)
BUN/CREAT SERPL: 31.6 (ref 7–25)
CALCIUM SERPL-MCNC: 9.5 MG/DL (ref 8.6–10.5)
CHLORIDE SERPL-SCNC: 104 MMOL/L (ref 98–107)
CO2 SERPL-SCNC: 24.6 MMOL/L (ref 22–29)
CREAT SERPL-MCNC: 0.79 MG/DL (ref 0.76–1.27)
EOSINOPHIL # BLD AUTO: 0.13 10*3/MM3 (ref 0–0.4)
EOSINOPHIL NFR BLD AUTO: 2 % (ref 0.3–6.2)
ERYTHROCYTE [DISTWIDTH] IN BLOOD BY AUTOMATED COUNT: 12.6 % (ref 12.3–15.4)
GLOBULIN SER CALC-MCNC: 1.9 GM/DL
GLUCOSE SERPL-MCNC: 105 MG/DL (ref 65–99)
HCT VFR BLD AUTO: 42.5 % (ref 37.5–51)
HGB BLD-MCNC: 14.3 G/DL (ref 13–17.7)
IMM GRANULOCYTES # BLD AUTO: 0.01 10*3/MM3 (ref 0–0.05)
IMM GRANULOCYTES NFR BLD AUTO: 0.2 % (ref 0–0.5)
LYMPHOCYTES # BLD AUTO: 1.05 10*3/MM3 (ref 0.7–3.1)
LYMPHOCYTES NFR BLD AUTO: 16.5 % (ref 19.6–45.3)
MCH RBC QN AUTO: 30 PG (ref 26.6–33)
MCHC RBC AUTO-ENTMCNC: 33.6 G/DL (ref 31.5–35.7)
MCV RBC AUTO: 89.1 FL (ref 79–97)
MONOCYTES # BLD AUTO: 0.39 10*3/MM3 (ref 0.1–0.9)
MONOCYTES NFR BLD AUTO: 6.1 % (ref 5–12)
NEUTROPHILS # BLD AUTO: 4.71 10*3/MM3 (ref 1.7–7)
NEUTROPHILS NFR BLD AUTO: 74.1 % (ref 42.7–76)
NRBC BLD AUTO-RTO: 0 /100 WBC (ref 0–0.2)
PLATELET # BLD AUTO: 322 10*3/MM3 (ref 140–450)
POTASSIUM SERPL-SCNC: 4.5 MMOL/L (ref 3.5–5.2)
PROT SERPL-MCNC: 6.3 G/DL (ref 6–8.5)
RBC # BLD AUTO: 4.77 10*6/MM3 (ref 4.14–5.8)
SODIUM SERPL-SCNC: 141 MMOL/L (ref 136–145)
TSH SERPL DL<=0.005 MIU/L-ACNC: 2.36 UIU/ML (ref 0.27–4.2)
WBC # BLD AUTO: 6.36 10*3/MM3 (ref 3.4–10.8)

## 2021-09-30 DIAGNOSIS — M54.50 ACUTE MIDLINE LOW BACK PAIN WITHOUT SCIATICA: Primary | ICD-10-CM

## 2021-09-30 DIAGNOSIS — C61 PROSTATE CANCER (HCC): ICD-10-CM

## 2021-10-04 ENCOUNTER — PREP FOR SURGERY (OUTPATIENT)
Dept: OTHER | Facility: HOSPITAL | Age: 66
End: 2021-10-04

## 2021-10-04 DIAGNOSIS — Z80.0 FAMILY HISTORY OF COLON CANCER: Primary | ICD-10-CM

## 2021-10-06 ENCOUNTER — PREP FOR SURGERY (OUTPATIENT)
Dept: SURGERY | Facility: SURGERY CENTER | Age: 66
End: 2021-10-06

## 2021-10-06 ENCOUNTER — OFFICE VISIT (OUTPATIENT)
Dept: PAIN MEDICINE | Facility: CLINIC | Age: 66
End: 2021-10-06

## 2021-10-06 ENCOUNTER — TRANSCRIBE ORDERS (OUTPATIENT)
Dept: SURGERY | Facility: SURGERY CENTER | Age: 66
End: 2021-10-06

## 2021-10-06 VITALS
TEMPERATURE: 97.5 F | HEIGHT: 72 IN | BODY MASS INDEX: 28.47 KG/M2 | WEIGHT: 210.2 LBS | DIASTOLIC BLOOD PRESSURE: 84 MMHG | HEART RATE: 86 BPM | SYSTOLIC BLOOD PRESSURE: 131 MMHG | OXYGEN SATURATION: 98 %

## 2021-10-06 DIAGNOSIS — Z41.9 SURGERY, ELECTIVE: Primary | ICD-10-CM

## 2021-10-06 DIAGNOSIS — M54.50 BILATERAL LOW BACK PAIN WITHOUT SCIATICA, UNSPECIFIED CHRONICITY: Primary | ICD-10-CM

## 2021-10-06 DIAGNOSIS — M43.17 SPONDYLOLISTHESIS OF LUMBOSACRAL REGION: ICD-10-CM

## 2021-10-06 DIAGNOSIS — M54.50 LUMBAR SPINE PAIN: Primary | ICD-10-CM

## 2021-10-06 DIAGNOSIS — M47.816 SPONDYLOSIS OF LUMBAR REGION WITHOUT MYELOPATHY OR RADICULOPATHY: ICD-10-CM

## 2021-10-06 DIAGNOSIS — M54.50 ACUTE BILATERAL LOW BACK PAIN WITHOUT SCIATICA: ICD-10-CM

## 2021-10-06 LAB
POC AMPHETAMINES: POSITIVE
POC BARBITURATES: NEGATIVE
POC BENZODIAZEPHINES: NEGATIVE
POC COCAINE: NEGATIVE
POC METHADONE: NEGATIVE
POC METHAMPHETAMINE SCREEN URINE: NEGATIVE
POC OPIATES: POSITIVE
POC OXYCODONE: NEGATIVE
POC PHENCYCLIDINE: NEGATIVE
POC PROPOXYPHENE: NEGATIVE
POC THC: NEGATIVE
POC TRICYCLIC ANTIDEPRESSANTS: NEGATIVE

## 2021-10-06 PROCEDURE — 99204 OFFICE O/P NEW MOD 45 MIN: CPT | Performed by: ANESTHESIOLOGY

## 2021-10-06 PROCEDURE — 80305 DRUG TEST PRSMV DIR OPT OBS: CPT | Performed by: ANESTHESIOLOGY

## 2021-10-06 NOTE — PATIENT INSTRUCTIONS
Facet Joint Block  The facet joints connect the bones of the spine (vertebrae). They make it possible for you to bend, twist, and make other movements with your spine. They also keep you from bending too far, twisting too far, and making other extreme movements.  A facet joint block is a procedure in which a numbing medicine (anesthetic) is injected into a facet joint. In many cases, an anti-inflammatory medicine (steroid) is also injected. A facet joint block may be done:  · To diagnose neck or back pain. If the pain gets better after a facet joint block, it means the pain is probably coming from the facet joint. If the pain does not get better, it means the pain is probably not coming from the facet joint.  · To relieve neck or back pain that is caused by an inflamed facet joint.  A facet joint block is only done to relieve pain if the pain does not improve with other methods, such as medicine, exercise programs, and physical therapy.  Tell a health care provider about:  · Any allergies you have.  · All medicines you are taking, including vitamins, herbs, eye drops, creams, and over-the-counter medicines.  · Any problems you or family members have had with anesthetic medicines.  · Any blood disorders you have.  · Any surgeries you have had.  · Any medical conditions you have or have had.  · Whether you are pregnant or may be pregnant.  What are the risks?  Generally, this is a safe procedure. However, problems may occur, including:  · Bleeding.  · Injury to a nerve near the injection site.  · Pain at the injection site.  · Weakness or numbness in areas controlled by nerves near the injection site.  · Infection.  · Temporary fluid retention.  · Allergic reactions to medicines or dyes.  · Injury to other structures or organs near the injection site.  What happens before the procedure?  Medicines  Ask your health care provider about:  · Changing or stopping your regular medicines. This is especially important if you  are taking diabetes medicines or blood thinners.  · Taking medicines such as aspirin and ibuprofen. These medicines can thin your blood. Do not take these medicines unless your health care provider tells you to take them.  · Taking over-the-counter medicines, vitamins, herbs, and supplements.  Eating and drinking  Follow instructions from your health care provider about eating and drinking, which may include:  · 8 hours before the procedure - stop eating heavy meals or foods, such as meat, fried foods, or fatty foods.  · 6 hours before the procedure - stop eating light meals or foods, such as toast or cereal.  · 6 hours before the procedure - stop drinking milk or drinks that contain milk.  · 2 hours before the procedure - stop drinking clear liquids.  Staying hydrated  Follow instructions from your health care provider about hydration, which may include:  · Up to 2 hours before the procedure - you may continue to drink clear liquids, such as water, clear fruit juice, black coffee, and plain tea.  General instructions  · Do not use any products that contain nicotine or tobacco for at least 4-6 weeks before the procedure. These products include cigarettes, e-cigarettes, and chewing tobacco. If you need help quitting, ask your health care provider.  · Plan to have someone take you home from the hospital or clinic.  · Ask your health care provider:  ? How your surgery site will be marked.  ? What steps will be taken to help prevent infection. These may include:  § Removing hair at the surgery site.  § Washing skin with a germ-killing soap.  § Receiving antibiotic medicine.  What happens during the procedure?    · You will put on a hospital gown.  · You will lie on your stomach on an X-ray table. You may be asked to lie in a different position if an injection will be made in your neck.  · Machines will be used to monitor your oxygen levels, heart rate, and blood pressure.  · Your skin will be cleaned.  · If an injection  will be made in your neck, an IV will be inserted into one of your veins. Fluids and medicine will flow directly into your body through the IV.  · A numbing medicine (local anesthetic) will be applied to your skin. Your skin may sting or burn for a moment.  · A video X-ray machine (fluoroscopy) will be used to find the joint. In some cases, a CT scan may be used.  · A contrast dye may be injected into the facet joint area to help find the joint.  · When the joint is located, an anesthetic will be injected into the joint through the needle.  · Your health care provider will ask you whether you feel pain relief.  ? If you feel relief, a steroid may be injected to provide pain relief for a longer period of time.  ? If you do not feel relief or feel only partial relief, additional injections of an anesthetic may be made in other facet joints.  · The needle will be removed.  · Your skin will be cleaned.  · A bandage (dressing) will be applied over each injection site.  The procedure may vary among health care providers and hospitals.  What happens after the procedure?  · Your blood pressure, heart rate, breathing rate, and blood oxygen level will be monitored until you leave the hospital or clinic.  · You will lie down and rest for a period of time.  Summary  · A facet joint block is a procedure in which a numbing medicine (anesthetic) is injected into a facet joint. An anti-inflammatory medicine (stereoid) may also be injected.  · Follow instructions from your health care provider about medicines and eating and drinking before the procedure.  · Do not use any products that contain nicotine or tobacco for at least 4-6 weeks before the procedure.  · You will lie on your stomach for the procedure, but you may be asked to lie in a different position if an injection will be made in your neck.  · When the joint is located, an anesthetic will be injected into the joint through the needle.  This information is not intended to  replace advice given to you by your health care provider. Make sure you discuss any questions you have with your health care provider.  Document Revised: 04/09/2020 Document Reviewed: 11/22/2019  [employer] Patient Education © 2021 [employer]      ---      Facet Syndrome    Facet syndrome is a condition in which joints (facet joints) that connect the bones of the spine (vertebrae) become damaged. Facet joints help the spine move, and they wear down (degenerate) or become inflamed as a person gets older. This can cause pain and stiffness in the neck (cervical facet syndrome) or in the lower back (lumbar facet syndrome).  When a facet joint becomes damaged, a vertebra may slip forward, out of its normal place in the spine. Damage to a facet joint can also damage nerves near the spine, which can cause tingling or weakness in the arms or legs. Facet syndrome can make it difficult to turn the head or bend backward without pain. This condition usually gets worse over time.  What are the causes?  Common causes of this condition include:  · Age-related inflammation of the facet joints (arthritis) that may create extra bone on the joint surface (bone spurs).  · Age-related decrease in space between the vertebrae (disk degeneration and cartilage degeneration).  · Repetitive stress on the spine, such as repetitive twisting of the back.  · Injury to the back or neck.  · Poor posture.  · Being overweight or obese.  What increases the risk?  The following factors may make you more likely to develop this condition:  · Playing contact sports.  · Doing activities or sports that involve repetitive twisting motions or repetitive heavy lifting.  · Having poor back strength and flexibility.  · Having another back or spine condition, such as scoliosis.  What are the signs or symptoms?  Symptoms of facet syndrome may include:  · An ache in the neck or lower back. This may get worse when you twist or arch your back, or when you look  up.  · Stiffness in the neck or lower back.  · Numbness, tingling, or weakness in the arms or legs.  Symptoms of cervical facet syndrome may include:  · Headache.  · Pain in the back of the head.  · Pain in the shoulder blades.  Symptoms of lumbar facet syndrome may include pain in any of the following areas:  · Groin.  · Thighs.  · Lower back.  · Buttocks.  · Hips.  How is this diagnosed?  This condition may be diagnosed based on:  · Your symptoms.  · Your medical history.  · A physical exam.  · Imaging tests, such as:  ? X-rays.  ? MRI.  · A procedure in which medicines to numb the area (local anesthetic) and medicines to reduce inflammation (steroids) are injected into your affected joint (facet joint block). This helps to diagnose and may relieve pain.  How is this treated?  This condition may be treated by:  · Stopping or modifying activities that make your condition worse.  · Taking medicines that help reduce pain and inflammation.  · Having steroid injections to help reduce severe pain.  · Doing physical therapy.  · Following a weight-control plan.  · Having a procedure called radiofrequency ablation. This is a surgical procedure that uses high-frequency radio waves to block signals from affected nerves.  · Having surgery to stabilize your spine or to take pressure off your nerves. This is rare.  Follow these instructions at home:  Medicines  · Take over-the-counter and prescription medicines only as told by your health care provider.  · Ask your health care provider if the medicine prescribed to you:  ? Requires you to avoid driving or using heavy machinery.  ? Can cause constipation. You may need to take actions to prevent or treat constipation, such as:  § Drink enough fluid to keep your urine pale yellow.  § Take over-the-counter or prescription medicines.  § Eat foods that are high in fiber, such as beans, whole grains, and fresh fruits and vegetables.  § Limit foods that are high in fat and processed  sugars, such as fried or sweet foods.  Activity  · Rest your neck and back as told by your health care provider.  · Return to your normal activities as told by your health care provider. Ask your health care provider what activities are safe for you.  · If physical therapy was prescribed, do exercises as told by your health care provider.  General instructions    · Do not use any products that contain nicotine or tobacco, such as cigarettes, e-cigarettes, and chewing tobacco. These can delay healing. If you need help quitting, ask your health care provider.  · Use good posture throughout your daily activities. Good posture means that your spine is in its natural S-curve position (your spine is neutral), your shoulders are pulled back slightly, and your head is not forward.  · Maintain a healthy weight. Follow instructions from your health care provider for weight control. These may include dietary restrictions.  · Keep all follow-up visits as told by your health care provider. This is important.    Contact a health care provider if you have:  · Symptoms that get worse or do not improve in 2-4 weeks of treatment.  · New symptoms.  Get help right away if you:  · Have numbness or weakness in any part of your body.  · Lose control over your bladder or bowel functions.  Summary  · Facet syndrome is a condition in which joints (facet joints) that connect the bones of the spine (vertebrae) become damaged. This can cause pain and stiffness in the neck (cervical facet syndrome) or in the lower back (lumbar facet syndrome).  · Rest your neck and back as told by your health care provider.  · If physical therapy was prescribed, do exercises as told by your health care provider.  · Take over-the-counter and prescription medicines only as told by your health care provider.  · Contact a health care provider if you have symptoms that get worse or do not improve in 2-4 weeks of treatment, or if you have new symptoms.  This  information is not intended to replace advice given to you by your health care provider. Make sure you discuss any questions you have with your health care provider.  Document Revised: 11/26/2019 Document Reviewed: 12/01/2019  [employer] Patient Education © 2021 [employer]
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
## 2021-10-07 ENCOUNTER — TELEPHONE (OUTPATIENT)
Dept: INTERNAL MEDICINE | Facility: CLINIC | Age: 66
End: 2021-10-07

## 2021-10-26 ENCOUNTER — HOSPITAL ENCOUNTER (OUTPATIENT)
Dept: GENERAL RADIOLOGY | Facility: SURGERY CENTER | Age: 66
Setting detail: HOSPITAL OUTPATIENT SURGERY
End: 2021-10-26

## 2021-10-26 ENCOUNTER — HOSPITAL ENCOUNTER (OUTPATIENT)
Facility: SURGERY CENTER | Age: 66
Setting detail: HOSPITAL OUTPATIENT SURGERY
Discharge: HOME | End: 2021-10-26
Attending: ANESTHESIOLOGY | Admitting: ANESTHESIOLOGY

## 2021-10-26 VITALS
HEIGHT: 72 IN | DIASTOLIC BLOOD PRESSURE: 94 MMHG | RESPIRATION RATE: 16 BRPM | BODY MASS INDEX: 28.44 KG/M2 | WEIGHT: 210 LBS | SYSTOLIC BLOOD PRESSURE: 120 MMHG | OXYGEN SATURATION: 96 % | HEART RATE: 73 BPM | TEMPERATURE: 98.2 F

## 2021-10-26 DIAGNOSIS — Z41.9 SURGERY, ELECTIVE: ICD-10-CM

## 2021-10-26 PROCEDURE — 25010000002 MIDAZOLAM PER 1 MG: Performed by: ANESTHESIOLOGY

## 2021-10-26 PROCEDURE — 0 IOHEXOL 300 MG/ML SOLUTION 10 ML VIAL: Performed by: ANESTHESIOLOGY

## 2021-10-26 PROCEDURE — 64494 INJ PARAVERT F JNT L/S 2 LEV: CPT | Performed by: ANESTHESIOLOGY

## 2021-10-26 PROCEDURE — 64493 INJ PARAVERT F JNT L/S 1 LEV: CPT | Performed by: ANESTHESIOLOGY

## 2021-10-26 PROCEDURE — 3E0U3BZ INTRODUCTION OF ANESTHETIC AGENT INTO JOINTS, PERCUTANEOUS APPROACH: ICD-10-PCS | Performed by: ANESTHESIOLOGY

## 2021-10-26 PROCEDURE — 25010000002 METHYLPREDNISOLONE PER 80 MG: Performed by: ANESTHESIOLOGY

## 2021-10-26 PROCEDURE — 77002 NEEDLE LOCALIZATION BY XRAY: CPT

## 2021-10-26 PROCEDURE — 76000 FLUOROSCOPY <1 HR PHYS/QHP: CPT

## 2021-10-26 PROCEDURE — 25010000002 FENTANYL CITRATE (PF) 50 MCG/ML SOLUTION: Performed by: ANESTHESIOLOGY

## 2021-10-26 RX ORDER — FENTANYL CITRATE 50 UG/ML
INJECTION, SOLUTION INTRAMUSCULAR; INTRAVENOUS AS NEEDED
Status: DISCONTINUED | OUTPATIENT
Start: 2021-10-26 | End: 2021-10-26 | Stop reason: HOSPADM

## 2021-10-26 RX ORDER — HYDROCODONE BITARTRATE AND ACETAMINOPHEN 7.5; 325 MG/1; MG/1
1 TABLET ORAL EVERY 6 HOURS PRN
COMMUNITY
End: 2021-11-29 | Stop reason: SDUPTHER

## 2021-10-26 RX ORDER — MIDAZOLAM HYDROCHLORIDE 1 MG/ML
INJECTION INTRAMUSCULAR; INTRAVENOUS AS NEEDED
Status: DISCONTINUED | OUTPATIENT
Start: 2021-10-26 | End: 2021-10-26 | Stop reason: HOSPADM

## 2021-11-12 ENCOUNTER — OFFICE VISIT (OUTPATIENT)
Dept: PAIN MEDICINE | Facility: CLINIC | Age: 66
End: 2021-11-12

## 2021-11-12 DIAGNOSIS — M43.17 SPONDYLOLISTHESIS OF LUMBOSACRAL REGION: ICD-10-CM

## 2021-11-12 DIAGNOSIS — M54.50 ACUTE BILATERAL LOW BACK PAIN WITHOUT SCIATICA: ICD-10-CM

## 2021-11-12 DIAGNOSIS — M47.816 SPONDYLOSIS OF LUMBAR REGION WITHOUT MYELOPATHY OR RADICULOPATHY: Primary | ICD-10-CM

## 2021-11-12 PROCEDURE — 99442 PR PHYS/QHP TELEPHONE EVALUATION 11-20 MIN: CPT | Performed by: NURSE PRACTITIONER

## 2021-11-12 RX ORDER — BUDESONIDE AND FORMOTEROL FUMARATE DIHYDRATE 160; 4.5 UG/1; UG/1
2 AEROSOL RESPIRATORY (INHALATION)
COMMUNITY
End: 2021-11-29 | Stop reason: SDUPTHER

## 2021-11-12 RX ORDER — ALBUTEROL SULFATE 90 UG/1
2 AEROSOL, METERED RESPIRATORY (INHALATION)
COMMUNITY
End: 2021-11-29 | Stop reason: SDUPTHER

## 2021-11-12 RX ORDER — ATOMOXETINE 40 MG/1
40 CAPSULE ORAL DAILY
COMMUNITY
Start: 2021-09-08 | End: 2021-11-29

## 2021-11-12 NOTE — PROGRESS NOTES
TELEPHONE VISIT    You have chosen to receive care through a telephone visit. Do you consent to use a telephone visit for your medical care today? Yes    Identity verified via  and address  Location of patient: Private residence  Location of Provider: Home office  Anyone else present: No  Type of Technology used: Telephone    CHIEF COMPLAINT  F/u back pain. Pt had bilateral lumbar facet blockade (L5-S1, L4-L5)    Subjective   Girish Greenberg is a 66 y.o. male  who presents for a telephonic follow-up.He has a history of back pain.    He completed Bilateral lumbar facet blocks from L4-S1 on 10/26/2021 performed by Dr. Sullivan.  He reports 70-75% relief from this procedure the day of the procedure.  He then had 50% relief x 2 weeks and then his pain returned to baseline.     Today his pain is 7/10VAS in severity.  Discussed with patient that with his significant relief the day of the procedure from his facet joint injection I recommend proceeding with Bilateral L4-S1 MBBs with the goal of RFA. He asks about how to help his sleep quality as his pain is interfering with his sleep.  Discussed good sleep hygiene, and discussed that ultimately I recommend interventions to try and mitigate his pain. Patient states understanding     Back Pain  This is a chronic problem. The current episode started more than 1 month ago (Right about a month ago). The problem occurs constantly. The problem has been gradually worsening since onset. The pain is present in the lumbar spine (axial distribution). The quality of the pain is described as shooting and stabbing. The pain does not radiate (Other than that bandlike radiation pain into the Left upper gluteal area). The pain is at a severity of 7/10. The pain is moderate. The pain is the same all the time. The symptoms are aggravated by bending, standing and twisting (position change, walking). Pertinent negatives include no abdominal pain, chest pain, dysuria, fever, headaches, numbness or  weakness. 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 He has tried analgesics, bed rest, heat, home exercises and NSAIDs (PT was not beneficial, does do some home stretches) for the symptoms. The treatment provided mild (He had some moderate relief with the leftover hydrocodone pill.  He says he took one last night he also says is the only what he has taken.  It helped him get some more sleep.) relief.      PEG Assessment   What number best describes your pain on average in the past week?7  What number best describes how, during the past week, pain has interfered with your enjoyment of life?7  What number best describes how, during the past week, pain has interfered with your general activity?  7    The following portions of the patient's history were reviewed and updated as appropriate: allergies, current medications, past family history, past medical history, past social history, past surgical history and problem list.    Review of Systems   Constitutional: Positive for fatigue (occ). Negative for activity change and fever.   HENT: Negative for congestion.    Eyes: Negative for visual disturbance.   Respiratory: Negative for cough and shortness of breath.    Cardiovascular: Negative for chest pain.   Gastrointestinal: Negative for abdominal pain, constipation and diarrhea.   Genitourinary: Positive for frequency (hx prostate CA). Negative for difficulty urinating and dysuria.   Musculoskeletal: Positive for back pain.   Neurological: Negative for dizziness, weakness, light-headedness, numbness and headaches.   Psychiatric/Behavioral: Positive for agitation (occ) and sleep disturbance (occ). Negative for suicidal ideas. The patient is not nervous/anxious.      --  The aforementioned information the Chief Complaint section and above subjective data including any HPI data, and also the Review of Systems data, has been personally reviewed and  affirmed.  --    Office visit from 10/6/2021 with Dr. Sullivan reviewed.  Patient complains of back pain that started approximately 4 weeks ago.  The pain is sharp and starting to radiate into his buttock.  He participated in PT for 2 weeks and did not help.  He trialed Flexeril which was not helpful.  He is also using Advil, heat, and lidocaine patch.  His pain radiates in a bandlike distribution bilaterally across the lower lumbar/lumbosacral area.  Long-term opioid therapy is not in his best interest but there are better treatment to try and address his pain.  Follow-up for bilateral L4-5 and L5-S1 facet blocks.    Vitals:    11/12/21 1424   PainSc:   7   PainLoc: Back     Objective   Physical Exam  As this is a telephone check-in, the ability to perform a routine physical exam is extremely limited. The patient seems alert and is oriented appropriately.   On this phone call there is not any evidence of respiratory distress.   The patient seems of normal mood.   The remainder of a routine physical exam is deferred.    Assessment/Plan   Diagnoses and all orders for this visit:    1. Spondylosis of lumbar region without myelopathy or radiculopathy (Primary)    2. Spondylolisthesis of lumbosacral region    3. Acute bilateral low back pain without sciatica      ----------------    Our practice is offering alternative &/or electronic methods to continue to follow our patients while at the same time further the efforts toward social distancing, in accordance with our organizational policies, professional societies' guidance, and gubernatorial mandates.  I support the Healthy at Home campaign and in this visit I have counseled the patient on our needs to limit in-person office visits and physical encounters with medical facilities whenever possible.  I have also educated the patient on the medical necessities of maintaining social distancing while we continue to function during this crisis period.      The patient had  obstacles which preclude consideration for a Video Visit. The patient agreed to a Telephone Encounter.    This visit has been rescheduled as a phone visit to comply with patient safety concerns in accordance with [facility] recommendations. Total time of discussion was 15 minutes.    ----------------    --- Bilateral L4-S1 MBB--Patient will call if he decides to move forward with this procedure.   Reviewed the procedure at length with the patient.  Included in the review was expectations, complications, risk and benefits.The procedure was described in detail and the risks, benefits and alternatives were discussed with the patient (including but not limited to: bleeding, infection, nerve damage, worsening of pain, inability to perform injection, paralysis, seizures, coma, no pain relief and death) who agreed to proceed.  Discussed the potential for sedation if warranted/wanted.  The procedure will plan to be performed at [facility] with fluoroscopic guidance(unless ultrasound is indicated) and could potentially have steroids and contrast dye used. Questions were answered and in a way the patient could understand.  Patient verbalized understanding and wishes to proceed.  This intervention will be ordered.  Discussed with patient that all procedures are part of a multimodal plan of care and include either formal PT or a home exercise program.  Patient has no evidence of coagulopathy or current infection.    -------  Education about Medial Branch Blockade and RF Therapy:    This medial branch blockade (MBB) suggested is intended for diagnostic purposes, with the intent of offering the patient Radiofrequency thermal rhizotomy (RF) if the MBB is diagnostically effective.  The diagnostic blockade is necessary to determine the likelihood that RF therapy could be efficacious in providing long term relief to the patient.    Medial branches are sensory nerve branches that connect to a facet joint and transmit  "sensations & pain signals from that joint.  Facet is a term for the type of joints found in the spine.  Medial branches are the nerves that go to a facet, and therefore are also sometimes called \"facet joint nerves\" (FJNs).      In a medial branch blockade procedure, xray fluoroscopy is used to verify the locations of the outside of the joint lines which are being targeted.  Under xray guidance, needles are placed to these areas.  Contrast dye is injected to confirm proper placement, with dye flowing over the joint area, and to ensure that the dye does not flow into unintended areas such as a vein.  When this is confirmed, local anesthetic is injected to block the medial branch at that joint level.      If MBBs are diagnostically successful in blocking pain, then the patient is most likely a great candidate for Radiofrequency of those facet joint nerves.  In the RF procedure, needles are placed to the joint lines in the same fashion, and after testing, the needle tips are heated to thermally treat the nerves, blocking the nerves by in essence damaging the nerves with the heat treatment.       Medically, a successful RF procedure should provide a patient with 50% pain relief or more for at least 6 months.  Clinical experience suggests that successful patients receive relief more in the range of 12 months on average.  We also discussed that a fortunate minority of patients receive therapeutic success from the MBB, and may not require RF ablation.  If a patient receives more than 8 weeks of relief from MBB, then occasional repeat MBB for therapeutic purposes is a very reasonable alternative therapy.  This course of therapy is consistent with our LCDs according to our CMS  in the area, and therefore other insurance providers should follow accordingly.  We will monitor our patients to screen for these therapeutic responders and will offer RF therapy only when necessary.      We discussed that MBB & RF are " not without risks.  Guidelines regarding anticoagulant use & neuraxial procedures will be respected.  Patients that are ill or otherwise may be at risk for sepsis will not have their spines accessed by neuraxial injections of any type.  This patient will not be offered these therapies if there is an increased risk.   We discussed that there is a risk of postprocedural pain and also a risk of worsening of clinical picture with these procedures as with any neuraxial procedure.    -------    --- Follow-up after procedure.      SEGUN REPORT    As the clinician, I personally reviewed the SEGUN from 11/12/2021 while the patient was in the office today.    -------

## 2021-11-17 PROBLEM — Z80.0 FAMILY HISTORY OF COLON CANCER: Status: ACTIVE | Noted: 2021-11-17

## 2021-11-18 DIAGNOSIS — Z12.5 SCREENING FOR PROSTATE CANCER: ICD-10-CM

## 2021-11-18 DIAGNOSIS — Z13.220 NEED FOR LIPID SCREENING: ICD-10-CM

## 2021-11-18 DIAGNOSIS — Z13.29 SCREENING FOR THYROID DISORDER: Primary | ICD-10-CM

## 2021-11-29 ENCOUNTER — OFFICE VISIT (OUTPATIENT)
Dept: INTERNAL MEDICINE | Facility: CLINIC | Age: 66
End: 2021-11-29

## 2021-11-29 VITALS
DIASTOLIC BLOOD PRESSURE: 84 MMHG | HEIGHT: 72 IN | HEART RATE: 92 BPM | BODY MASS INDEX: 28.04 KG/M2 | WEIGHT: 207 LBS | SYSTOLIC BLOOD PRESSURE: 142 MMHG

## 2021-11-29 DIAGNOSIS — Z00.00 HEALTHCARE MAINTENANCE: Primary | ICD-10-CM

## 2021-11-29 DIAGNOSIS — F98.8 ATTENTION DEFICIT DISORDER (ADD) WITHOUT HYPERACTIVITY: ICD-10-CM

## 2021-11-29 DIAGNOSIS — M54.42 CHRONIC LEFT-SIDED LOW BACK PAIN WITH LEFT-SIDED SCIATICA: ICD-10-CM

## 2021-11-29 DIAGNOSIS — G89.29 CHRONIC LEFT-SIDED LOW BACK PAIN WITH LEFT-SIDED SCIATICA: ICD-10-CM

## 2021-11-29 DIAGNOSIS — C61 PROSTATE CANCER (HCC): ICD-10-CM

## 2021-11-29 DIAGNOSIS — M19.90 OSTEOARTHRITIS, UNSPECIFIED OSTEOARTHRITIS TYPE, UNSPECIFIED SITE: ICD-10-CM

## 2021-11-29 DIAGNOSIS — E03.9 HYPOTHYROIDISM, UNSPECIFIED TYPE: ICD-10-CM

## 2021-11-29 PROCEDURE — G0439 PPPS, SUBSEQ VISIT: HCPCS | Performed by: INTERNAL MEDICINE

## 2021-11-29 PROCEDURE — 99214 OFFICE O/P EST MOD 30 MIN: CPT | Performed by: INTERNAL MEDICINE

## 2021-11-29 RX ORDER — HYDROCODONE BITARTRATE AND ACETAMINOPHEN 7.5; 325 MG/1; MG/1
1 TABLET ORAL EVERY 8 HOURS PRN
Qty: 30 TABLET | Refills: 0 | Status: SHIPPED | OUTPATIENT
Start: 2021-11-29 | End: 2022-02-11

## 2021-11-29 NOTE — PROGRESS NOTES
"The ABCs of the Annual Wellness Visit  Subsequent Medicare Wellness Visit    Chief Complaint   Patient presents with   • Annual Exam   • Back Pain   • Hip Pain   • attention deficit      Subjective    History of Present Illness:  Girish Greenberg is a 66 y.o. male who presents for a Subsequent Medicare Wellness Visit, to review chronic issues, and to discuss acute needs. Patient has low back to left hip pain. He has been to pain management and received injections. He then contacted an orthopedist and by patient's report received an epidural and left hip injection. Had relief of pain for several days and then pain returned. Notes that if he takes hydrocodone he is able to rest at night. Patient taking naproxen 3 tablets daily.  Unable to get MRI L spine related to bladder stimulator implanted. This has been turned off and he is interested in having it removed. He continues to get out of bed to urinate frequently in the evening. He is s/p prostatectomy for prostate cancer.   Has ADD. Notes that he is taking adderall on \"computer days\" for work. Works well when needed.   Has hypothyroidism. tsh at goal level when tested 2 months ago.       The following portions of the patient's history were reviewed and   updated as appropriate: allergies, current medications, past family history, past medical history, past social history, past surgical history and problem list.    Compared to one year ago, the patient feels his physical   health is the same.    Compared to one year ago, the patient feels his mental   health is the same.    Recent Hospitalizations:  He was not admitted to the hospital during the last year.       Current Medical Providers:  Patient Care Team:  Regina Landis MD as PCP - General (Internal Medicine)  Regina Landis MD as PCP - Family Medicine  Slade Borrego MD as Consulting Physician (Urology)  Brian Sabillon MD as Consulting Physician (General Surgery)    Outpatient Medications Prior to Visit "   Medication Sig Dispense Refill   • albuterol (PROVENTIL HFA;VENTOLIN HFA) 108 (90 BASE) MCG/ACT inhaler Inhale 2 puffs Every 6 (Six) Hours As Needed for shortness of air.     • amphetamine-dextroamphetamine XR (Adderall XR) 30 MG 24 hr capsule Take 1 capsule by mouth Every Morning 30 capsule 0   • Ascorbic Acid (VITAMIN C PO) Take 1 tablet by mouth Daily.     • BLACK ELDERBERRY PO Take  by mouth.     • budesonide-formoterol (SYMBICORT) 160-4.5 MCG/ACT inhaler Inhale 2 puffs Daily.     • CANASA 1000 MG suppository UNWRAP AND INSERT 1 SUPPOSITORY INTO THE RECTUM EVERY NIGHT. 30 suppository 0   • Cholecalciferol (VITAMIN D PO) Take 1 capsule by mouth Daily.     • Coenzyme Q10 (CO Q 10 PO) Take 1 capsule by mouth Daily.     • Cyanocobalamin (B-12 PO) Take 1 capsule by mouth Daily.     • cyclobenzaprine (FLEXERIL) 10 MG tablet Take 1 tablet by mouth 3 (Three) Times a Day As Needed for Muscle Spasms. 25 tablet 1   • Green Tea, Camillia sinensis, (GREEN TEA PO) Take 1 tablet by mouth Daily.     • levothyroxine (SYNTHROID, LEVOTHROID) 50 MCG tablet TAKE 1 TABLET BY MOUTH EVERY DAY 90 tablet 1   • pramoxine-hydrocortisone (ANALPRAM HC) 1-1 % cream Apply  topically 2 (Two) Times a Day As Needed for Itching, Irritation or Hemorrhoids. 28.4 g 2   • Red Yeast Rice Extract (RED YEAST RICE PO) Take 1 capsule by mouth Daily.     • TURMERIC CURCUMIN PO Take 1 capsule by mouth Daily.     • valACYclovir (Valtrex) 1000 MG tablet Take 1 tablet by mouth 2 (Two) Times a Day. 8 tablet 3   • HYDROcodone-acetaminophen (NORCO) 7.5-325 MG per tablet Take 1 tablet by mouth Every 6 (Six) Hours As Needed for Moderate Pain .     • meloxicam (MOBIC) 15 MG tablet Take 1 tablet by mouth Daily. 30 tablet 1   • ketoconazole (Nizoral) 2 % shampoo Apply  topically to the appropriate area as directed 2 (Two) Times a Week. 120 mL 0   • albuterol sulfate  (90 Base) MCG/ACT inhaler Inhale 2 puffs.     • atomoxetine (STRATTERA) 40 MG capsule Take 40  "mg by mouth Daily.     • budesonide-formoterol (SYMBICORT) 160-4.5 MCG/ACT inhaler Inhale 2 puffs.       No facility-administered medications prior to visit.       Opioid medication/s are on active medication list.  and I have evaluated his active treatment plan and pain score trends (see table).  There were no vitals filed for this visit.  I have reviewed the chart for potential of high risk medication and harmful drug interactions in the elderly.            Aspirin is not on active medication list.  Aspirin use is not indicated based on review of current medical condition/s. Risk of harm outweighs potential benefits.  .    Patient Active Problem List   Diagnosis   • Prostate cancer (HCC)   • Rib pain on left side   • Obstructive sleep apnea syndrome   • Osteoarthritis   • Reactive depression   • Bilateral low back pain without sciatica   • Family history of colon cancer     Advance Care Planning  Advance Directive is not on file.  ACP discussion was held with the patient during this visit. Patient does not have an advance directive, information provided.          Objective    Vitals:    11/29/21 1116   BP: 142/84   Pulse: 92   Weight: 93.9 kg (207 lb)   Height: 182.9 cm (72\")     BMI Readings from Last 1 Encounters:   11/29/21 28.07 kg/m²   BMI is above normal parameters. Recommendations include: educational material, exercise counseling and nutrition counseling    Does the patient have evidence of cognitive impairment? No    Physical Exam  Vitals and nursing note reviewed.   Constitutional:       Appearance: Normal appearance. He is well-developed.   HENT:      Head: Normocephalic and atraumatic.      Right Ear: Tympanic membrane and external ear normal.      Left Ear: Tympanic membrane and external ear normal.      Nose: Nose normal.      Mouth/Throat:      Mouth: Mucous membranes are moist.   Eyes:      Extraocular Movements: Extraocular movements intact.      Pupils: Pupils are equal, round, and reactive to " light.   Cardiovascular:      Rate and Rhythm: Normal rate and regular rhythm.      Heart sounds: Normal heart sounds.   Pulmonary:      Effort: Pulmonary effort is normal. No respiratory distress.      Breath sounds: Normal breath sounds.   Abdominal:      General: Abdomen is flat.      Palpations: Abdomen is soft.   Genitourinary:     Comments: Per urology    Musculoskeletal:         General: Normal range of motion.      Cervical back: Normal range of motion and neck supple.   Skin:     General: Skin is warm and dry.   Neurological:      General: No focal deficit present.      Mental Status: He is alert and oriented to person, place, and time.   Psychiatric:         Mood and Affect: Mood normal.         Behavior: Behavior normal.         Thought Content: Thought content normal.         Judgment: Judgment normal.                 HEALTH RISK ASSESSMENT    Smoking Status:  Social History     Tobacco Use   Smoking Status Never Smoker   Smokeless Tobacco Never Used     Alcohol Consumption:  Social History     Substance and Sexual Activity   Alcohol Use Yes   • Alcohol/week: 8.0 standard drinks   • Types: 8 Shots of liquor per week     Fall Risk Screen:    STEADI Fall Risk Assessment was completed, and patient is at LOW risk for falls.Assessment completed on:11/29/2021    Depression Screening:  PHQ-2/PHQ-9 Depression Screening 10/6/2021   Little interest or pleasure in doing things 0   Feeling down, depressed, or hopeless 0   Total Score 0       Health Habits and Functional and Cognitive Screening:  No flowsheet data found.    Age-appropriate Screening Schedule:  Refer to the list below for future screening recommendations based on patient's age, sex and/or medical conditions. Orders for these recommended tests are listed in the plan section. The patient has been provided with a written plan.    Health Maintenance   Topic Date Due   • ZOSTER VACCINE (1 of 2) Never done   • TDAP/TD VACCINES (4 - Td or Tdap) 09/04/2028    • INFLUENZA VACCINE  Completed              Assessment/Plan   CMS Preventative Services Quick Reference  Risk Factors Identified During Encounter  Cardiovascular Disease  Immunizations Discussed/Encouraged (specific Immunizations; Shingrix and COVID19  Obesity/Overweight   The above risks/problems have been discussed with the patient.  Follow up actions/plans if indicated are seen below in the Assessment/Plan Section.  Pertinent information has been shared with the patient in the After Visit Summary.    Diagnoses and all orders for this visit:    1. Healthcare maintenance (Primary)  -     CBC & Differential  -     Comprehensive Metabolic Panel  -     Lipid Panel With LDL / HDL Ratio  -     TSH    2. Prostate cancer (HCC)    3. Chronic left-sided low back pain with left-sided sciatica  -     HYDROcodone-acetaminophen (NORCO) 7.5-325 MG per tablet; Take 1 tablet by mouth Every 8 (Eight) Hours As Needed for Moderate Pain .  Dispense: 30 tablet; Refill: 0    4. Osteoarthritis, unspecified osteoarthritis type, unspecified site  -     CBC & Differential  -     Comprehensive Metabolic Panel  -     Lipid Panel With LDL / HDL Ratio  -     TSH    5. Attention deficit disorder (ADD) without hyperactivity  -     CBC & Differential  -     Comprehensive Metabolic Panel  -     Lipid Panel With LDL / HDL Ratio  -     TSH    6. Hypothyroidism, unspecified type  -     CBC & Differential  -     Comprehensive Metabolic Panel  -     Lipid Panel With LDL / HDL Ratio  -     TSH        Follow Up:   No follow-ups on file.     An After Visit Summary and PPPS were made available to the patient. He will continue current med for ADD. He is aware of the risks and benefits of the medication. He has signed an agreement form. hw has oa w/ continued pain. Will give short term opioid while seeking a more long term solution. Could consider a neuropathic medication instead. He is to consider an ablation therapy. To have bladder stim removed and then  get MRI to determine best next treatmen and will f/u w/ pain mgmt and ortho. To request records from ortho as well. Will get listed labs to assess thyroid funciton as well as renal and hepatic function. F/u w/ urology routinely.           I spent 55 minutes caring for Girish on this date of service. This time includes time spent by me in the following activities:preparing for the visit, reviewing tests, obtaining and/or reviewing a separately obtained history, performing a medically appropriate examination and/or evaluation , counseling and educating the patient/family/caregiver, ordering medications, tests, or procedures, documenting information in the medical record and independently interpreting results and communicating that information with the patient/family/caregiver

## 2021-11-30 LAB
ALBUMIN SERPL-MCNC: 4.4 G/DL (ref 3.8–4.8)
ALBUMIN/GLOB SERPL: 2.1 {RATIO} (ref 1.2–2.2)
ALP SERPL-CCNC: 53 IU/L (ref 44–121)
ALT SERPL-CCNC: 23 IU/L (ref 0–44)
APPEARANCE UR: CLEAR
AST SERPL-CCNC: 20 IU/L (ref 0–40)
BACTERIA #/AREA URNS HPF: NORMAL /[HPF]
BASOPHILS # BLD AUTO: 0.1 X10E3/UL (ref 0–0.2)
BASOPHILS NFR BLD AUTO: 1 %
BILIRUB SERPL-MCNC: 0.4 MG/DL (ref 0–1.2)
BILIRUB UR QL STRIP: NEGATIVE
BUN SERPL-MCNC: 25 MG/DL (ref 8–27)
BUN/CREAT SERPL: 32 (ref 10–24)
CALCIUM SERPL-MCNC: 9.5 MG/DL (ref 8.6–10.2)
CASTS URNS QL MICRO: NORMAL /LPF
CHLORIDE SERPL-SCNC: 102 MMOL/L (ref 96–106)
CHOLEST SERPL-MCNC: 225 MG/DL (ref 100–199)
CO2 SERPL-SCNC: 22 MMOL/L (ref 20–29)
COLOR UR: YELLOW
CREAT SERPL-MCNC: 0.77 MG/DL (ref 0.76–1.27)
EOSINOPHIL # BLD AUTO: 0.1 X10E3/UL (ref 0–0.4)
EOSINOPHIL NFR BLD AUTO: 1 %
EPI CELLS #/AREA URNS HPF: NORMAL /HPF (ref 0–10)
ERYTHROCYTE [DISTWIDTH] IN BLOOD BY AUTOMATED COUNT: 12.9 % (ref 11.6–15.4)
GLOBULIN SER CALC-MCNC: 2.1 G/DL (ref 1.5–4.5)
GLUCOSE SERPL-MCNC: 103 MG/DL (ref 65–99)
GLUCOSE UR QL: NEGATIVE
HCT VFR BLD AUTO: 44.1 % (ref 37.5–51)
HDLC SERPL-MCNC: 89 MG/DL
HGB BLD-MCNC: 14.8 G/DL (ref 13–17.7)
HGB UR QL STRIP: NEGATIVE
IMM GRANULOCYTES # BLD AUTO: 0 X10E3/UL (ref 0–0.1)
IMM GRANULOCYTES NFR BLD AUTO: 0 %
KETONES UR QL STRIP: ABNORMAL
LDLC SERPL CALC-MCNC: 124 MG/DL (ref 0–99)
LEUKOCYTE ESTERASE UR QL STRIP: NEGATIVE
LYMPHOCYTES # BLD AUTO: 1.2 X10E3/UL (ref 0.7–3.1)
LYMPHOCYTES NFR BLD AUTO: 17 %
MCH RBC QN AUTO: 29.5 PG (ref 26.6–33)
MCHC RBC AUTO-ENTMCNC: 33.6 G/DL (ref 31.5–35.7)
MCV RBC AUTO: 88 FL (ref 79–97)
MICRO URNS: ABNORMAL
MICRO URNS: ABNORMAL
MONOCYTES # BLD AUTO: 0.4 X10E3/UL (ref 0.1–0.9)
MONOCYTES NFR BLD AUTO: 6 %
NEUTROPHILS # BLD AUTO: 5.3 X10E3/UL (ref 1.4–7)
NEUTROPHILS NFR BLD AUTO: 75 %
NITRITE UR QL STRIP: NEGATIVE
PH UR STRIP: 5.5 [PH] (ref 5–7.5)
PLATELET # BLD AUTO: 342 X10E3/UL (ref 150–450)
POTASSIUM SERPL-SCNC: 4.3 MMOL/L (ref 3.5–5.2)
PROT SERPL-MCNC: 6.5 G/DL (ref 6–8.5)
PROT UR QL STRIP: NEGATIVE
PSA SERPL-MCNC: 0.2 NG/ML (ref 0–4)
RBC # BLD AUTO: 5.01 X10E6/UL (ref 4.14–5.8)
RBC #/AREA URNS HPF: NORMAL /HPF (ref 0–2)
SODIUM SERPL-SCNC: 137 MMOL/L (ref 134–144)
SP GR UR: 1.03 (ref 1–1.03)
TRIGL SERPL-MCNC: 69 MG/DL (ref 0–149)
TSH SERPL DL<=0.005 MIU/L-ACNC: 2.97 UIU/ML (ref 0.45–4.5)
UROBILINOGEN UR STRIP-MCNC: 0.2 MG/DL (ref 0.2–1)
VLDLC SERPL CALC-MCNC: 12 MG/DL (ref 5–40)
WBC # BLD AUTO: 7.1 X10E3/UL (ref 3.4–10.8)
WBC #/AREA URNS HPF: NORMAL /HPF (ref 0–5)

## 2021-12-10 ENCOUNTER — TELEPHONE (OUTPATIENT)
Dept: INTERNAL MEDICINE | Facility: CLINIC | Age: 66
End: 2021-12-10

## 2021-12-10 ENCOUNTER — OFFICE VISIT (OUTPATIENT)
Dept: INTERNAL MEDICINE | Facility: CLINIC | Age: 66
End: 2021-12-10

## 2021-12-10 DIAGNOSIS — J02.9 SORE THROAT: Primary | ICD-10-CM

## 2021-12-10 DIAGNOSIS — R51.9 ACUTE NONINTRACTABLE HEADACHE, UNSPECIFIED HEADACHE TYPE: ICD-10-CM

## 2021-12-10 DIAGNOSIS — H92.02 LEFT EAR PAIN: ICD-10-CM

## 2021-12-10 LAB
EXPIRATION DATE: NORMAL
INTERNAL CONTROL: NORMAL
Lab: NORMAL
S PYO AG THROAT QL: NEGATIVE

## 2021-12-10 PROCEDURE — 87880 STREP A ASSAY W/OPTIC: CPT | Performed by: INTERNAL MEDICINE

## 2021-12-10 PROCEDURE — 99213 OFFICE O/P EST LOW 20 MIN: CPT | Performed by: INTERNAL MEDICINE

## 2021-12-10 RX ORDER — FLUTICASONE PROPIONATE 50 MCG
2 SPRAY, SUSPENSION (ML) NASAL DAILY
Qty: 1 ML | Refills: 3 | Status: SHIPPED | OUTPATIENT
Start: 2021-12-10 | End: 2022-02-11

## 2021-12-10 RX ORDER — DOXYCYCLINE 100 MG/1
100 CAPSULE ORAL 2 TIMES DAILY
Qty: 14 CAPSULE | Refills: 0 | Status: SHIPPED | OUTPATIENT
Start: 2021-12-10 | End: 2022-02-11

## 2021-12-10 RX ORDER — PREDNISONE 20 MG/1
20 TABLET ORAL DAILY
Qty: 4 TABLET | Refills: 0 | Status: SHIPPED | OUTPATIENT
Start: 2021-12-10 | End: 2021-12-28

## 2021-12-12 LAB
LABCORP SARS-COV-2, NAA 2 DAY TAT: NORMAL
SARS-COV-2 RNA RESP QL NAA+PROBE: NOT DETECTED

## 2021-12-22 ENCOUNTER — TELEPHONE (OUTPATIENT)
Dept: INTERNAL MEDICINE | Facility: CLINIC | Age: 66
End: 2021-12-22

## 2021-12-22 NOTE — TELEPHONE ENCOUNTER
Caller: Girish Greenberg    Relationship: Self    Best call back number: [phone]    What is the best time to reach you: ANYTIME    Who are you requesting to speak with (clinical staff, provider,  specific staff member): JOSETTE    What was the call regarding: PATIENT IS REQUESTING TO SPEAK WITH JOSETTE. PATIENT STATES HE STILL HAS CONGESTION AND REQUESTING A NEW MEDICATION    Do you require a callback: YES

## 2021-12-28 ENCOUNTER — OFFICE VISIT (OUTPATIENT)
Dept: INTERNAL MEDICINE | Facility: CLINIC | Age: 66
End: 2021-12-28

## 2021-12-28 VITALS
WEIGHT: 207 LBS | DIASTOLIC BLOOD PRESSURE: 76 MMHG | HEIGHT: 72 IN | BODY MASS INDEX: 28.04 KG/M2 | OXYGEN SATURATION: 98 % | SYSTOLIC BLOOD PRESSURE: 118 MMHG | HEART RATE: 85 BPM

## 2021-12-28 DIAGNOSIS — J01.00 SUBACUTE MAXILLARY SINUSITIS: ICD-10-CM

## 2021-12-28 DIAGNOSIS — J40 BRONCHITIS: Primary | ICD-10-CM

## 2021-12-28 LAB
HCT VFR BLDA CALC: 44.5 % (ref 38–51)
HGB BLDA-MCNC: 14.6 G/DL (ref 12–17)
LYMPHOCYTES # BLD: 18.1 %
MCH, POC: 29.8
MCHC, POC: 32.8
MCV, POC: 90.8
MONOCYTES # BLD: 4.6 %
PLATELET # BLD: 279 10*3/MM3
PMV BLD: 8.5 FL
POC NEUTROPHIL: 77.3 %
RBC, POC: 4.9
RDW, POC: 50.5
WBC # BLD: 6 10*3/UL

## 2021-12-28 PROCEDURE — 85025 COMPLETE CBC W/AUTO DIFF WBC: CPT | Performed by: INTERNAL MEDICINE

## 2021-12-28 PROCEDURE — 99214 OFFICE O/P EST MOD 30 MIN: CPT | Performed by: INTERNAL MEDICINE

## 2021-12-28 PROCEDURE — 71046 X-RAY EXAM CHEST 2 VIEWS: CPT | Performed by: INTERNAL MEDICINE

## 2021-12-28 RX ORDER — GUAIFENESIN AND CODEINE PHOSPHATE 100; 10 MG/5ML; MG/5ML
5 SOLUTION ORAL 3 TIMES DAILY PRN
Qty: 125 ML | Refills: 1 | Status: SHIPPED | OUTPATIENT
Start: 2021-12-28 | End: 2022-02-11

## 2021-12-28 RX ORDER — METHYLPREDNISOLONE 4 MG/1
TABLET ORAL
Qty: 21 EACH | Refills: 0 | Status: SHIPPED | OUTPATIENT
Start: 2021-12-28 | End: 2022-02-11

## 2021-12-28 RX ORDER — GUAIFENESIN AND CODEINE PHOSPHATE 100; 10 MG/5ML; MG/5ML
5 SOLUTION ORAL 3 TIMES DAILY PRN
Qty: 125 ML | Refills: 1 | Status: SHIPPED | OUTPATIENT
Start: 2021-12-28 | End: 2021-12-28 | Stop reason: SDUPTHER

## 2021-12-28 NOTE — PROGRESS NOTES
"Chief Complaint   Patient presents with   • Cough       History of Present Illness   Girish Greenberg is a 66 y.o. male presents for acute care. Patient w/ cough and congestion. Symptoms started 6 days ago. Went to urgent care. Treated with erythromycin. Reports that today he \"does not feel as dead as I was\". Has coughed until ribs hurt. He is taking mucinex for cough suppresion. Took doxycycline and short course prednisone 12/10 for similar symptoms. On symbicort 2 puffs twice daily. He takes this intermittently. On flonase 2 spray each nostril daily. Tested negative for flu, strep, and covid at [facility]. Has albuterol inhaler. Using this about one time a day. tmax 99.9.                 The following portions of the patient's history were reviewed and updated as appropriate: allergies, current medications, past family history, past medical history, past social history, past surgical history and problem list.  Current Outpatient Medications on File Prior to Visit   Medication Sig Dispense Refill   • albuterol (PROVENTIL HFA;VENTOLIN HFA) 108 (90 BASE) MCG/ACT inhaler Inhale 2 puffs Every 6 (Six) Hours As Needed for shortness of air.     • amphetamine-dextroamphetamine XR (Adderall XR) 30 MG 24 hr capsule Take 1 capsule by mouth Every Morning 30 capsule 0   • Ascorbic Acid (VITAMIN C PO) Take 1 tablet by mouth Daily.     • BLACK ELDERBERRY PO Take  by mouth.     • budesonide-formoterol (SYMBICORT) 160-4.5 MCG/ACT inhaler Inhale 2 puffs Daily.     • CANASA 1000 MG suppository UNWRAP AND INSERT 1 SUPPOSITORY INTO THE RECTUM EVERY NIGHT. 30 suppository 0   • Cholecalciferol (VITAMIN D PO) Take 1 capsule by mouth Daily.     • Coenzyme Q10 (CO Q 10 PO) Take 1 capsule by mouth Daily.     • Cyanocobalamin (B-12 PO) Take 1 capsule by mouth Daily.     • cyclobenzaprine (FLEXERIL) 10 MG tablet Take 1 tablet by mouth 3 (Three) Times a Day As Needed for Muscle Spasms. 25 tablet 1   • doxycycline (MONODOX) 100 MG capsule Take 1 capsule by " mouth 2 (Two) Times a Day. 14 capsule 0   • fluticasone (Flonase) 50 MCG/ACT nasal spray 2 sprays into the nostril(s) as directed by provider Daily. 1 mL 3   • Green Tea, Camillia sinensis, (GREEN TEA PO) Take 1 tablet by mouth Daily.     • HYDROcodone-acetaminophen (NORCO) 7.5-325 MG per tablet Take 1 tablet by mouth Every 8 (Eight) Hours As Needed for Moderate Pain . 30 tablet 0   • ketoconazole (Nizoral) 2 % shampoo Apply  topically to the appropriate area as directed 2 (Two) Times a Week. 120 mL 0   • levothyroxine (SYNTHROID, LEVOTHROID) 50 MCG tablet TAKE 1 TABLET BY MOUTH EVERY DAY 90 tablet 1   • pramoxine-hydrocortisone (ANALPRAM HC) 1-1 % cream Apply  topically 2 (Two) Times a Day As Needed for Itching, Irritation or Hemorrhoids. 28.4 g 2   • Red Yeast Rice Extract (RED YEAST RICE PO) Take 1 capsule by mouth Daily.     • TURMERIC CURCUMIN PO Take 1 capsule by mouth Daily.     • valACYclovir (Valtrex) 1000 MG tablet Take 1 tablet by mouth 2 (Two) Times a Day. 8 tablet 3   • [DISCONTINUED] predniSONE (DELTASONE) 20 MG tablet Take 1 tablet by mouth Daily. 4 tablet 0     No current facility-administered medications on file prior to visit.     Review of Systems   Constitutional: Positive for fatigue and fever.   HENT: Positive for postnasal drip, rhinorrhea, sinus pressure and sore throat.    Eyes: Negative.    Respiratory: Positive for cough and wheezing.    Cardiovascular: Negative.    Gastrointestinal: Negative.    Endocrine: Negative.    Genitourinary: Negative.    Musculoskeletal: Negative.    Skin: Negative.    Allergic/Immunologic: Negative.    Neurological: Negative.    Hematological: Negative.    Psychiatric/Behavioral: Negative.        Objective   Physical Exam  Vitals and nursing note reviewed.   Constitutional:       Appearance: Normal appearance.      Comments: Ill but not acutely distressed   HENT:      Head: Normocephalic and atraumatic.      Right Ear: Tympanic membrane normal.      Left Ear:  "Tympanic membrane normal.      Nose: Nose normal.      Mouth/Throat:      Comments: Pharyngeal erythema w/ post nasal drainage  Eyes:      Extraocular Movements: Extraocular movements intact.      Pupils: Pupils are equal, round, and reactive to light.   Cardiovascular:      Rate and Rhythm: Normal rate and regular rhythm.      Pulses: Normal pulses.      Heart sounds: Normal heart sounds.   Pulmonary:      Effort: Pulmonary effort is normal.      Breath sounds: Wheezing present.   Abdominal:      General: Abdomen is flat.      Palpations: Abdomen is soft.   Musculoskeletal:         General: Normal range of motion.      Cervical back: Normal range of motion.   Skin:     General: Skin is warm and dry.   Neurological:      General: No focal deficit present.      Mental Status: He is alert and oriented to person, place, and time.   Psychiatric:         Mood and Affect: Mood normal.         Behavior: Behavior normal.         Thought Content: Thought content normal.         Judgment: Judgment normal.        cxr for cough and congestion. No acute disease. No prior for comparison.     /76   Pulse 85   Ht 182.9 cm (72\")   Wt 93.9 kg (207 lb)   SpO2 98%   BMI 28.07 kg/m²     Assessment/Plan   Diagnoses and all orders for this visit:    Bronchitis    Subacute maxillary sinusitis    Other orders  -     Discontinue: guaiFENesin-codeine (GUAIFENESIN AC) 100-10 MG/5ML liquid; Take 5 mL by mouth 3 (Three) Times a Day As Needed for Cough.  -     guaiFENesin-codeine (GUAIFENESIN AC) 100-10 MG/5ML liquid; Take 5 mL by mouth 3 (Three) Times a Day As Needed for Cough.  -     methylPREDNISolone (MEDROL) 4 MG dose pack; Take as directed on package instructions.        Patient w/ acute bronchitis/ sinusitis. He will take cheratussin in a prn fashion. He will use flonase, nasal saline rinse. To start a steroid course. Will use a spacer w/ albuterol and use tid. Will hydrate well. Given nebulized xopenex w/ some improvement " today. patTo follow up if worsens or nor improvement.

## 2022-02-02 ENCOUNTER — HOSPITAL ENCOUNTER (OUTPATIENT)
Facility: HOSPITAL | Age: 67
Setting detail: HOSPITAL OUTPATIENT SURGERY
Discharge: HOME | End: 2022-02-02
Attending: SURGERY | Admitting: SURGERY

## 2022-02-02 ENCOUNTER — ANESTHESIA EVENT (OUTPATIENT)
Dept: GASTROENTEROLOGY | Facility: HOSPITAL | Age: 67
End: 2022-02-02

## 2022-02-02 ENCOUNTER — ANESTHESIA (OUTPATIENT)
Dept: GASTROENTEROLOGY | Facility: HOSPITAL | Age: 67
End: 2022-02-02

## 2022-02-02 VITALS
WEIGHT: 204.13 LBS | SYSTOLIC BLOOD PRESSURE: 124 MMHG | OXYGEN SATURATION: 95 % | BODY MASS INDEX: 27.65 KG/M2 | DIASTOLIC BLOOD PRESSURE: 81 MMHG | TEMPERATURE: 98.5 F | RESPIRATION RATE: 16 BRPM | HEART RATE: 68 BPM | HEIGHT: 72 IN

## 2022-02-02 DIAGNOSIS — Z80.0 FAMILY HISTORY OF COLON CANCER: ICD-10-CM

## 2022-02-02 PROCEDURE — 88305 TISSUE EXAM BY PATHOLOGIST: CPT | Performed by: SURGERY

## 2022-02-02 PROCEDURE — S0260 H&P FOR SURGERY: HCPCS | Performed by: SURGERY

## 2022-02-02 PROCEDURE — 45380 COLONOSCOPY AND BIOPSY: CPT | Performed by: SURGERY

## 2022-02-02 PROCEDURE — 25010000002 PROPOFOL 10 MG/ML EMULSION: Performed by: ANESTHESIOLOGY

## 2022-02-02 RX ORDER — LIDOCAINE HYDROCHLORIDE 20 MG/ML
INJECTION, SOLUTION INFILTRATION; PERINEURAL AS NEEDED
Status: DISCONTINUED | OUTPATIENT
Start: 2022-02-02 | End: 2022-02-02 | Stop reason: SURG

## 2022-02-02 RX ORDER — PROPOFOL 10 MG/ML
VIAL (ML) INTRAVENOUS CONTINUOUS PRN
Status: DISCONTINUED | OUTPATIENT
Start: 2022-02-02 | End: 2022-02-02 | Stop reason: SURG

## 2022-02-02 RX ORDER — PROPOFOL 10 MG/ML
VIAL (ML) INTRAVENOUS AS NEEDED
Status: DISCONTINUED | OUTPATIENT
Start: 2022-02-02 | End: 2022-02-02 | Stop reason: SURG

## 2022-02-02 RX ORDER — SODIUM CHLORIDE, SODIUM LACTATE, POTASSIUM CHLORIDE, CALCIUM CHLORIDE 600; 310; 30; 20 MG/100ML; MG/100ML; MG/100ML; MG/100ML
30 INJECTION, SOLUTION INTRAVENOUS CONTINUOUS PRN
Status: DISCONTINUED | OUTPATIENT
Start: 2022-02-02 | End: 2022-02-02 | Stop reason: HOSPADM

## 2022-02-02 RX ADMIN — SODIUM CHLORIDE, POTASSIUM CHLORIDE, SODIUM LACTATE AND CALCIUM CHLORIDE 30 ML/HR: 600; 310; 30; 20 INJECTION, SOLUTION INTRAVENOUS at 07:30

## 2022-02-02 RX ADMIN — PROPOFOL 150 MG: 10 INJECTION, EMULSION INTRAVENOUS at 08:08

## 2022-02-02 RX ADMIN — LIDOCAINE HYDROCHLORIDE 60 MG: 20 INJECTION, SOLUTION INFILTRATION; PERINEURAL at 08:08

## 2022-02-02 RX ADMIN — Medication 200 MCG/KG/MIN: at 08:08

## 2022-02-02 NOTE — H&P
HPI: Family history of colon cancer-mother    PMH, PSH, MEDS AND ALLERGIES reviewed and reconciled with EPIC    PHYSICAL EXAM:  -  Constitutional:  no acute distress  -  Respiratory:  normal inspiratory effort  -  Cardiovascular: regular rate  -  Gastrointestinal: Soft    ASSESSMENT/PLAN:    Surveillance colonoscopy    Gordy Orellana M.D.

## 2022-02-02 NOTE — ANESTHESIA PREPROCEDURE EVALUATION
Anesthesia Evaluation     Patient summary reviewed and Nursing notes reviewed   history of anesthetic complications: prolonged sedation               Airway   Mallampati: II  TM distance: >3 FB  Neck ROM: full  No difficulty expected  Dental - normal exam     Pulmonary - normal exam   (+) COPD, asthma,sleep apnea on CPAP,     ROS comment: Intolerant to CPAP  Cardiovascular - normal exam        Neuro/Psych  (+) psychiatric history Depression,       ROS Comment: Hx CHI  GI/Hepatic/Renal/Endo    (+)  GERD,  thyroid problem hypothyroidism    Musculoskeletal     Abdominal  - normal exam   Substance History      OB/GYN          Other   arthritis,    history of cancer      Other Comment: Hx prostate CA                  Anesthesia Plan    ASA 3     MAC     intravenous induction     Anesthetic plan, all risks, benefits, and alternatives have been provided, discussed and informed consent has been obtained with: patient.        CODE STATUS:

## 2022-02-02 NOTE — DISCHARGE INSTRUCTIONS
For the next 24 hours patient needs to be with a responsible adult.    For 24 hours DO NOT drive, operate machinery, appliances, drink alcohol, make important decisions or sign legal documents.    Start with a light or bland diet if you are feeling sick to your stomach otherwise advance to regular diet as tolerated.    Follow recommendations on procedure report if provided by your doctor.    Call Dr Orellana for problems [phone]    Problems may include but not limited to: large amounts of bleeding, trouble breathing, repeated vomiting, severe unrelieved pain, fever or chills.      Colon Polyps    Colon polyps are tissue growths inside the colon, which is part of the large intestine. They are one of the types of polyps that can grow in the body. A polyp may be a round bump or a mushroom-shaped growth. You could have one polyp or more than one.  Most colon polyps are noncancerous (benign). However, some colon polyps can become cancerous over time. Finding and removing the polyps early can help prevent this.  What are the causes?  The exact cause of colon polyps is not known.  What increases the risk?  The following factors may make you more likely to develop this condition:  · Having a family history of colorectal cancer or colon polyps.  · Being older than 45 years of age.  · Being younger than 45 years of age and having a significant family history of colorectal cancer or colon polyps or a genetic condition that puts you at higher risk of getting colon polyps.  · Having inflammatory bowel disease, such as ulcerative colitis or Crohn's disease.  · Having certain conditions passed from parent to child (hereditary conditions), such as:  ? Familial adenomatous polyposis (FAP).  ? Whiteside syndrome.  ? Turcot syndrome.  ? Peutz-Jeghers syndrome.  ? MUTYH-associated polyposis (MAP).  · Being overweight.  · Certain lifestyle factors. These include smoking cigarettes, drinking too much alcohol, not getting enough exercise,  and eating a diet that is high in fat and red meat and low in fiber.  · Having had childhood cancer that was treated with radiation of the abdomen.  What are the signs or symptoms?  Many times, there are no symptoms.  If you have symptoms, they may include:  · Blood coming from the rectum during a bowel movement.  · Blood in the stool (feces). The blood may be bright red or very dark in color.  · Pain in the abdomen.  · A change in bowel habits, such as constipation or diarrhea.  How is this diagnosed?  This condition is diagnosed with a colonoscopy. This is a procedure in which a lighted, flexible scope is inserted into the opening between the buttocks (anus) and then passed into the colon to examine the area. Polyps are sometimes found when a colonoscopy is done as part of routine cancer screening tests.  How is this treated?  This condition is treated by removing any polyps that are found. Most polyps can be removed during a colonoscopy. Those polyps will then be tested for cancer. Additional treatment may be needed depending on the results of testing.  Follow these instructions at home:  Eating and drinking    · Eat foods that are high in fiber, such as fruits, vegetables, and whole grains.  · Eat foods that are high in calcium and vitamin D, such as milk, cheese, yogurt, eggs, liver, fish, and broccoli.  · Limit foods that are high in fat, such as fried foods and desserts.  · Limit the amount of red meat, precooked or cured meat, or other processed meat that you eat, such as hot dogs, sausages, courtney, or meat loaves.  · Limit sugary drinks.    Lifestyle  · Maintain a healthy weight, or lose weight if recommended by your health care provider.  · Exercise every day or as told by your health care provider.  · Do not use any products that contain nicotine or tobacco, such as cigarettes, e-cigarettes, and chewing tobacco. If you need help quitting, ask your health care provider.  · Do not drink alcohol if:  ? Your  health care provider tells you not to drink.  ? You are pregnant, may be pregnant, or are planning to become pregnant.  · If you drink alcohol:  ? Limit how much you use to:  § 0-1 drink a day for women.  § 0-2 drinks a day for men.  ? Know how much alcohol is in your drink. In the [place] one drink equals one 12 oz bottle of beer (355 mL), one 5 oz glass of wine (148 mL), or one 1½ oz glass of hard liquor (44 mL).  General instructions  · Take over-the-counter and prescription medicines only as told by your health care provider.  · Keep all follow-up visits. This is important. This includes having regularly scheduled colonoscopies. Talk to your health care provider about when you need a colonoscopy.  Contact a health care provider if:  · You have new or worsening bleeding during a bowel movement.  · You have new or increased blood in your stool.  · You have a change in bowel habits.  · You lose weight for no known reason.  Summary  · Colon polyps are tissue growths inside the colon, which is part of the large intestine. They are one type of polyp that can grow in the body.  · Most colon polyps are noncancerous (benign), but some can become cancerous over time.  · This condition is diagnosed with a colonoscopy.  · This condition is treated by removing any polyps that are found. Most polyps can be removed during a colonoscopy.  This information is not intended to replace advice given to you by your health care provider. Make sure you discuss any questions you have with your health care provider.  Document Revised: 04/07/2021 Document Reviewed: 04/07/2021  Elsevier Patient Education © 2021 [employer]

## 2022-02-02 NOTE — ANESTHESIA POSTPROCEDURE EVALUATION
Patient: Girish Greenberg    Procedure Summary     Date: 02/02/22 Room / Location:  [facility]    Anesthesia Start: 0806 Anesthesia Stop: 0828    Procedure: COLONOSCOPY to CECUM WITH COLD BX POLYPECTOMY (N/A ) Diagnosis:       Family history of colon cancer      (Family history of colon cancer [Z80.0])    Surgeons: Gordy Orellana MD Provider: Akbar Coker MD    Anesthesia Type: MAC ASA Status: 3          Anesthesia Type: MAC    Vitals  No vitals data found for the desired time range.          Post Anesthesia Care and Evaluation    Patient location during evaluation: PHASE II  Patient participation: complete - patient participated  Level of consciousness: awake and alert  Pain management: adequate  Airway patency: patent  Anesthetic complications: No anesthetic complications  PONV Status: none  Cardiovascular status: acceptable and hemodynamically stable  Respiratory status: acceptable, nonlabored ventilation and spontaneous ventilation  Hydration status: acceptable

## 2022-02-02 NOTE — OP NOTE
PREOPERATIVE DIAGNOSIS:  High risk screening secondary to family history of colon cancer-mother    POSTOPERATIVE DIAGNOSIS AND FINDINGS:  1.  Small ascending colon polyp  2.  Small rectal polyp    PROCEDURE:  Colonoscopy to cecum with cold biopsy removal of polyps    SURGEON:  Gordy Orellana MD    ANESTHESIA:  MAC    SPECIMEN(S):  Polyps    DESCRIPTION:  In decubitus position digital rectal exam was normal. Colonoscope inserted under direct visualization of lumen to cecum confirmed by visualization of ileocecal valve and appendiceal orifice.  Scope was slowly withdrawn circumferentially examining all mucosal surfaces.  Bowel preparation was good.  Small ascending colon polyp noted and removed with cold biopsy forceps, good hemostasis at the site.  Small rectal polyp noted and removed with cold biopsy forceps, good hemostasis noted at the site.  No other mucosal abnormalities noted, tolerated well.    RECOMMENDATION FOR FUTURE SURVEILLANCE:  To be determined based on polyp pathology and issued as separate report    Gordy Orellana M.D.

## 2022-02-03 LAB
LAB AP CASE REPORT: NORMAL
PATH REPORT.FINAL DX SPEC: NORMAL
PATH REPORT.GROSS SPEC: NORMAL

## 2022-02-06 ENCOUNTER — DOCUMENTATION (OUTPATIENT)
Dept: SURGERY | Facility: CLINIC | Age: 67
End: 2022-02-06

## 2022-02-06 NOTE — PROGRESS NOTES
ENDOSCOPY FOLLOW UP NOTE    Colonoscopy  2/2/2022    Indication:  • Family history of colon cancer-mother    Findings:  1. Small ascending polyp  2. Small rectal polyp    Pathology:  1. Hyperplastic   2. Tubulovillous     Recommendations:  • Based on family history of colon cancer and tubulovillous nature of polyp, 2 year surveillance recommended     Gordy Orellana M.D.

## 2022-02-06 NOTE — PROGRESS NOTES
Please let him know he had two benign polyps. Based on family history of colon cancer and the tubulovillous nature of the rectal polyp, he should get surveillance in two years-put in computer for reminder

## 2022-02-07 ENCOUNTER — TELEPHONE (OUTPATIENT)
Dept: SURGERY | Facility: CLINIC | Age: 67
End: 2022-02-07

## 2022-02-07 ENCOUNTER — TRANSCRIBE ORDERS (OUTPATIENT)
Dept: ADMINISTRATIVE | Facility: HOSPITAL | Age: 67
End: 2022-02-07

## 2022-02-07 DIAGNOSIS — R06.00 DYSPNEA, UNSPECIFIED TYPE: Primary | ICD-10-CM

## 2022-02-07 NOTE — TELEPHONE ENCOUNTER
----- Message from Gordy Orellana MD sent at 2/6/2022  8:19 AM EST -----  Please let him know he had two benign polyps. Based on family history of colon cancer and the tubulovillous nature of the rectal polyp, he should get surveillance in two years-put in computer for reminder

## 2022-02-11 ENCOUNTER — PRE-ADMISSION TESTING (OUTPATIENT)
Dept: PREADMISSION TESTING | Facility: HOSPITAL | Age: 67
End: 2022-02-11

## 2022-02-11 VITALS
DIASTOLIC BLOOD PRESSURE: 77 MMHG | WEIGHT: 218 LBS | HEIGHT: 72 IN | TEMPERATURE: 97.5 F | BODY MASS INDEX: 29.53 KG/M2 | SYSTOLIC BLOOD PRESSURE: 130 MMHG | HEART RATE: 69 BPM | RESPIRATION RATE: 20 BRPM | OXYGEN SATURATION: 98 %

## 2022-02-11 LAB
ANION GAP SERPL CALCULATED.3IONS-SCNC: 8.5 MMOL/L (ref 5–15)
BUN SERPL-MCNC: 22 MG/DL (ref 8–23)
BUN/CREAT SERPL: 20.4 (ref 7–25)
CALCIUM SPEC-SCNC: 9.2 MG/DL (ref 8.6–10.5)
CHLORIDE SERPL-SCNC: 110 MMOL/L (ref 98–107)
CO2 SERPL-SCNC: 24.5 MMOL/L (ref 22–29)
CREAT SERPL-MCNC: 1.08 MG/DL (ref 0.76–1.27)
DEPRECATED RDW RBC AUTO: 42.4 FL (ref 37–54)
ERYTHROCYTE [DISTWIDTH] IN BLOOD BY AUTOMATED COUNT: 13.1 % (ref 12.3–15.4)
GFR SERPL CREATININE-BSD FRML MDRD: 68 ML/MIN/1.73
GLUCOSE SERPL-MCNC: 101 MG/DL (ref 65–99)
HCT VFR BLD AUTO: 42.4 % (ref 37.5–51)
HGB BLD-MCNC: 14.4 G/DL (ref 13–17.7)
MCH RBC QN AUTO: 30.4 PG (ref 26.6–33)
MCHC RBC AUTO-ENTMCNC: 34 G/DL (ref 31.5–35.7)
MCV RBC AUTO: 89.6 FL (ref 79–97)
PLATELET # BLD AUTO: 266 10*3/MM3 (ref 140–450)
PMV BLD AUTO: 9.1 FL (ref 6–12)
POTASSIUM SERPL-SCNC: 4.8 MMOL/L (ref 3.5–5.2)
QT INTERVAL: 384 MS
RBC # BLD AUTO: 4.73 10*6/MM3 (ref 4.14–5.8)
SODIUM SERPL-SCNC: 143 MMOL/L (ref 136–145)
WBC NRBC COR # BLD: 5.24 10*3/MM3 (ref 3.4–10.8)

## 2022-02-11 PROCEDURE — 36415 COLL VENOUS BLD VENIPUNCTURE: CPT

## 2022-02-11 PROCEDURE — 80048 BASIC METABOLIC PNL TOTAL CA: CPT

## 2022-02-11 PROCEDURE — 93010 ELECTROCARDIOGRAM REPORT: CPT | Performed by: INTERNAL MEDICINE

## 2022-02-11 PROCEDURE — 85027 COMPLETE CBC AUTOMATED: CPT

## 2022-02-11 PROCEDURE — 93005 ELECTROCARDIOGRAM TRACING: CPT

## 2022-02-11 NOTE — DISCHARGE INSTRUCTIONS
Take the following medications the morning of surgery with a small sip of water:  INHALERS, LEVOTHYROXINE    ARRIVAL TIME FOR SURGERY IS 7:00AM      If you are on prescription narcotic pain medication to control your pain you may also take that medication the morning of surgery.    General Instructions:  • Do not eat or drink anything after midnight the night before surgery.  • Infants may have breast milk up to four hours before surgery.  • Infants drinking formula may drink formula up to six hours before surgery.   • Patients who avoid smoking, chewing tobacco and alcohol for 4 weeks prior to surgery have a reduced risk of post-operative complications.  Quit smoking as many days before surgery as you can.  • Do not smoke, use chewing tobacco or drink alcohol the day of surgery.   • If applicable bring your C-PAP/ BI-PAP machine.  • Bring any papers given to you in the doctor’s office.  • Wear clean comfortable clothes.  • Do not wear contact lenses, false eyelashes or make-up.  Bring a case for your glasses.   • Bring crutches or walker if applicable.  • Remove all piercings.  Leave jewelry and any other valuables at home.  • Hair extensions with metal clips must be removed prior to surgery.  • The Pre-Admission Testing nurse will instruct you to bring medications if unable to obtain an accurate list in Pre-Admission Testing.        If you were given a blood bank ID arm band remember to bring it with you the day of surgery.    Preventing a Surgical Site Infection:  • For 2 to 3 days before surgery, avoid shaving with a razor because the razor can irritate skin and make it easier to develop an infection.    • Any areas of open skin can increase the risk of a post-operative wound infection by allowing bacteria to enter and travel throughout the body.  Notify your surgeon if you have any skin wounds / rashes even if it is not near the expected surgical site.  The area will need assessed to determine if surgery should  be delayed until it is healed.  • The night prior to surgery shower using a fresh bar of anti-bacterial soap (such as Dial) and clean washcloth.  Sleep in a clean bed with clean clothing.  Do not allow pets to sleep with you.  • Shower on the morning of surgery using a fresh bar of anti-bacterial soap (such as Dial) and clean washcloth.  Dry with a clean towel and dress in clean clothing.  • Ask your surgeon if you will be receiving antibiotics prior to surgery.  • Make sure you, your family, and all healthcare providers clean their hands with soap and water or an alcohol based hand  before caring for you or your wound.    Day of surgery:  Your arrival time is approximately two hours before your scheduled surgery time.  Upon arrival, a Pre-op nurse and Anesthesiologist will review your health history, obtain vital signs, and answer questions you may have.  The only belongings needed at this time will be your home medications and if applicable your C-PAP/BI-PAP machine.  A Pre-op nurse will start an IV and you may receive medication in preparation for surgery, including something to help you relax.      Please be aware that surgery does come with discomfort.  We want to make every effort to control your discomfort so please discuss any uncontrolled symptoms with your nurse.   Your doctor will most likely have prescribed pain medications.      If you are going home after surgery you will receive individualized written care instructions before being discharged.  A responsible adult must drive you to and from the hospital on the day of your surgery and stay with you for 24 hours.  Discharge prescriptions can be filled by the hospital pharmacy during regular pharmacy hours.  If you are having surgery late in the day/evening your prescription may be e-prescribed to your pharmacy.  Please verify your pharmacy hours or chose a 24 hour pharmacy to avoid not having access to your prescription because your pharmacy  has closed for the day.    If you are staying overnight following surgery, you will be transported to your hospital room following the recovery period.  [facility] has all private rooms.    If you have any questions please call Pre-Admission Testing at [phone].  Deductibles and co-payments are collected on the day of service. Please be prepared to pay the required co-pay, deductible or deposit on the day of service as defined by your plan.    Patient Education for Self-Quarantine Process    • Following your COVID testing, we strongly recommend that you wear a mask when you are with other people and practice social distancing.   • Limit your activities to only required outings.  • Wash your hands with soap and water frequently for at least 20 seconds.   • Avoid touching your eyes, nose and mouth with unwashed hands.  • Do not share anything - utensils, drinking glasses, food from the same bowl.   • Sanitize household surfaces daily. Include all high touch areas (door handles, light switches, phones, countertops, etc.)    Call your surgeon immediately if you experience any of the following symptoms:  • Sore Throat  • Shortness of Breath or difficulty breathing  • Cough  • Chills  • Body soreness or muscle pain  • Headache  • Fever  • New loss of taste or smell  • Do not arrive for your surgery ill.  Your procedure will need to be rescheduled to another time.  You will need to call your physician before the day of surgery to avoid any unnecessary exposure to hospital staff as well as other patients.

## 2022-02-12 ENCOUNTER — LAB (OUTPATIENT)
Dept: LAB | Facility: HOSPITAL | Age: 67
End: 2022-02-12

## 2022-02-12 LAB — SARS-COV-2 ORF1AB RESP QL NAA+PROBE: NOT DETECTED

## 2022-02-12 PROCEDURE — C9803 HOPD COVID-19 SPEC COLLECT: HCPCS

## 2022-02-12 PROCEDURE — U0004 COV-19 TEST NON-CDC HGH THRU: HCPCS

## 2022-02-12 PROCEDURE — U0005 INFEC AGEN DETEC AMPLI PROBE: HCPCS

## 2022-03-02 ENCOUNTER — APPOINTMENT (OUTPATIENT)
Dept: GENERAL RADIOLOGY | Facility: HOSPITAL | Age: 67
End: 2022-03-02

## 2022-03-16 ENCOUNTER — PRE-ADMISSION TESTING (OUTPATIENT)
Dept: PREADMISSION TESTING | Facility: HOSPITAL | Age: 67
End: 2022-03-16

## 2022-03-16 VITALS
RESPIRATION RATE: 18 BRPM | HEART RATE: 68 BPM | OXYGEN SATURATION: 97 % | HEIGHT: 72 IN | WEIGHT: 215 LBS | BODY MASS INDEX: 29.12 KG/M2 | DIASTOLIC BLOOD PRESSURE: 78 MMHG | SYSTOLIC BLOOD PRESSURE: 118 MMHG | TEMPERATURE: 98 F

## 2022-03-16 LAB
ANION GAP SERPL CALCULATED.3IONS-SCNC: 9 MMOL/L (ref 5–15)
BUN SERPL-MCNC: 28 MG/DL (ref 8–23)
BUN/CREAT SERPL: 32.6 (ref 7–25)
CALCIUM SPEC-SCNC: 9 MG/DL (ref 8.6–10.5)
CHLORIDE SERPL-SCNC: 106 MMOL/L (ref 98–107)
CO2 SERPL-SCNC: 22 MMOL/L (ref 22–29)
CREAT SERPL-MCNC: 0.86 MG/DL (ref 0.76–1.27)
DEPRECATED RDW RBC AUTO: 41.5 FL (ref 37–54)
EGFRCR SERPLBLD CKD-EPI 2021: 95.5 ML/MIN/1.73
ERYTHROCYTE [DISTWIDTH] IN BLOOD BY AUTOMATED COUNT: 12.7 % (ref 12.3–15.4)
GLUCOSE SERPL-MCNC: 109 MG/DL (ref 65–99)
HCT VFR BLD AUTO: 41.7 % (ref 37.5–51)
HGB BLD-MCNC: 13.8 G/DL (ref 13–17.7)
MCH RBC QN AUTO: 29.7 PG (ref 26.6–33)
MCHC RBC AUTO-ENTMCNC: 33.1 G/DL (ref 31.5–35.7)
MCV RBC AUTO: 89.9 FL (ref 79–97)
PLATELET # BLD AUTO: 284 10*3/MM3 (ref 140–450)
PMV BLD AUTO: 9.3 FL (ref 6–12)
POTASSIUM SERPL-SCNC: 4.4 MMOL/L (ref 3.5–5.2)
RBC # BLD AUTO: 4.64 10*6/MM3 (ref 4.14–5.8)
SARS-COV-2 ORF1AB RESP QL NAA+PROBE: NOT DETECTED
SODIUM SERPL-SCNC: 137 MMOL/L (ref 136–145)
WBC NRBC COR # BLD: 5.71 10*3/MM3 (ref 3.4–10.8)

## 2022-03-16 PROCEDURE — 85027 COMPLETE CBC AUTOMATED: CPT | Performed by: UROLOGY

## 2022-03-16 PROCEDURE — U0004 COV-19 TEST NON-CDC HGH THRU: HCPCS | Performed by: NURSE PRACTITIONER

## 2022-03-16 PROCEDURE — 80048 BASIC METABOLIC PNL TOTAL CA: CPT | Performed by: UROLOGY

## 2022-03-16 PROCEDURE — C9803 HOPD COVID-19 SPEC COLLECT: HCPCS | Performed by: NURSE PRACTITIONER

## 2022-03-16 PROCEDURE — C9803 HOPD COVID-19 SPEC COLLECT: HCPCS

## 2022-03-16 PROCEDURE — 36415 COLL VENOUS BLD VENIPUNCTURE: CPT | Performed by: UROLOGY

## 2022-03-18 ENCOUNTER — ANESTHESIA (OUTPATIENT)
Dept: PERIOP | Facility: HOSPITAL | Age: 67
End: 2022-03-18

## 2022-03-18 ENCOUNTER — HOSPITAL ENCOUNTER (OUTPATIENT)
Facility: HOSPITAL | Age: 67
Setting detail: HOSPITAL OUTPATIENT SURGERY
Discharge: HOME | End: 2022-03-18
Attending: UROLOGY | Admitting: UROLOGY

## 2022-03-18 ENCOUNTER — APPOINTMENT (OUTPATIENT)
Dept: GENERAL RADIOLOGY | Facility: HOSPITAL | Age: 67
End: 2022-03-18

## 2022-03-18 ENCOUNTER — ANESTHESIA EVENT (OUTPATIENT)
Dept: PERIOP | Facility: HOSPITAL | Age: 67
End: 2022-03-18

## 2022-03-18 VITALS
TEMPERATURE: 97.5 F | WEIGHT: 215.61 LBS | DIASTOLIC BLOOD PRESSURE: 90 MMHG | HEART RATE: 61 BPM | SYSTOLIC BLOOD PRESSURE: 155 MMHG | HEIGHT: 72 IN | BODY MASS INDEX: 29.2 KG/M2 | RESPIRATION RATE: 16 BRPM | OXYGEN SATURATION: 98 %

## 2022-03-18 DIAGNOSIS — N39.41 URGE INCONTINENCE OF URINE: Primary | ICD-10-CM

## 2022-03-18 PROCEDURE — 76000 FLUOROSCOPY <1 HR PHYS/QHP: CPT | Performed by: UROLOGY

## 2022-03-18 PROCEDURE — 72220 X-RAY EXAM SACRUM TAILBONE: CPT

## 2022-03-18 PROCEDURE — 25010000002 ONDANSETRON PER 1 MG: Performed by: ANESTHESIOLOGY

## 2022-03-18 PROCEDURE — 25010000002 MIDAZOLAM PER 1 MG: Performed by: ANESTHESIOLOGY

## 2022-03-18 PROCEDURE — 25010000002 LEVOFLOXACIN PER 250 MG: Performed by: UROLOGY

## 2022-03-18 PROCEDURE — 72220 X-RAY EXAM SACRUM TAILBONE: CPT | Performed by: UROLOGY

## 2022-03-18 PROCEDURE — 25010000002 KETOROLAC TROMETHAMINE PER 15 MG: Performed by: ANESTHESIOLOGY

## 2022-03-18 PROCEDURE — 25010000002 PROPOFOL 10 MG/ML EMULSION: Performed by: REGISTERED NURSE

## 2022-03-18 RX ORDER — DIPHENHYDRAMINE HYDROCHLORIDE 50 MG/ML
12.5 INJECTION INTRAMUSCULAR; INTRAVENOUS
Status: DISCONTINUED | OUTPATIENT
Start: 2022-03-18 | End: 2022-03-18 | Stop reason: HOSPADM

## 2022-03-18 RX ORDER — OXYCODONE AND ACETAMINOPHEN 7.5; 325 MG/1; MG/1
1 TABLET ORAL EVERY 4 HOURS PRN
Status: DISCONTINUED | OUTPATIENT
Start: 2022-03-18 | End: 2022-03-18 | Stop reason: HOSPADM

## 2022-03-18 RX ORDER — PROMETHAZINE HYDROCHLORIDE 25 MG/1
25 SUPPOSITORY RECTAL ONCE AS NEEDED
Status: DISCONTINUED | OUTPATIENT
Start: 2022-03-18 | End: 2022-03-18 | Stop reason: HOSPADM

## 2022-03-18 RX ORDER — FAMOTIDINE 10 MG/ML
20 INJECTION, SOLUTION INTRAVENOUS ONCE
Status: COMPLETED | OUTPATIENT
Start: 2022-03-18 | End: 2022-03-18

## 2022-03-18 RX ORDER — SODIUM CHLORIDE, SODIUM LACTATE, POTASSIUM CHLORIDE, CALCIUM CHLORIDE 600; 310; 30; 20 MG/100ML; MG/100ML; MG/100ML; MG/100ML
9 INJECTION, SOLUTION INTRAVENOUS CONTINUOUS
Status: DISCONTINUED | OUTPATIENT
Start: 2022-03-18 | End: 2022-03-18 | Stop reason: HOSPADM

## 2022-03-18 RX ORDER — FLUMAZENIL 0.1 MG/ML
0.2 INJECTION INTRAVENOUS AS NEEDED
Status: DISCONTINUED | OUTPATIENT
Start: 2022-03-18 | End: 2022-03-18 | Stop reason: HOSPADM

## 2022-03-18 RX ORDER — LIDOCAINE HYDROCHLORIDE 10 MG/ML
0.5 INJECTION, SOLUTION EPIDURAL; INFILTRATION; INTRACAUDAL; PERINEURAL ONCE AS NEEDED
Status: DISCONTINUED | OUTPATIENT
Start: 2022-03-18 | End: 2022-03-18 | Stop reason: HOSPADM

## 2022-03-18 RX ORDER — HYDROCODONE BITARTRATE AND ACETAMINOPHEN 7.5; 325 MG/1; MG/1
1 TABLET ORAL ONCE AS NEEDED
Status: DISCONTINUED | OUTPATIENT
Start: 2022-03-18 | End: 2022-03-18 | Stop reason: HOSPADM

## 2022-03-18 RX ORDER — ONDANSETRON 2 MG/ML
4 INJECTION INTRAMUSCULAR; INTRAVENOUS ONCE AS NEEDED
Status: DISCONTINUED | OUTPATIENT
Start: 2022-03-18 | End: 2022-03-18 | Stop reason: HOSPADM

## 2022-03-18 RX ORDER — PROPOFOL 10 MG/ML
VIAL (ML) INTRAVENOUS AS NEEDED
Status: DISCONTINUED | OUTPATIENT
Start: 2022-03-18 | End: 2022-03-18 | Stop reason: SURG

## 2022-03-18 RX ORDER — FENTANYL CITRATE 50 UG/ML
50 INJECTION, SOLUTION INTRAMUSCULAR; INTRAVENOUS
Status: DISCONTINUED | OUTPATIENT
Start: 2022-03-18 | End: 2022-03-18 | Stop reason: HOSPADM

## 2022-03-18 RX ORDER — HYDROCODONE BITARTRATE AND ACETAMINOPHEN 5; 325 MG/1; MG/1
1 TABLET ORAL EVERY 6 HOURS PRN
Qty: 20 TABLET | Refills: 0 | Status: SHIPPED | OUTPATIENT
Start: 2022-03-18 | End: 2022-05-23

## 2022-03-18 RX ORDER — CEFAZOLIN SODIUM 1 G/50ML
1 INJECTION, SOLUTION INTRAVENOUS ONCE
Status: DISCONTINUED | OUTPATIENT
Start: 2022-03-18 | End: 2022-03-18

## 2022-03-18 RX ORDER — PROMETHAZINE HYDROCHLORIDE 12.5 MG/1
25 TABLET ORAL ONCE AS NEEDED
Status: DISCONTINUED | OUTPATIENT
Start: 2022-03-18 | End: 2022-03-18 | Stop reason: HOSPADM

## 2022-03-18 RX ORDER — LIDOCAINE HYDROCHLORIDE 10 MG/ML
INJECTION, SOLUTION EPIDURAL; INFILTRATION; INTRACAUDAL; PERINEURAL AS NEEDED
Status: DISCONTINUED | OUTPATIENT
Start: 2022-03-18 | End: 2022-03-18 | Stop reason: HOSPADM

## 2022-03-18 RX ORDER — IBUPROFEN 600 MG/1
600 TABLET ORAL ONCE AS NEEDED
Status: DISCONTINUED | OUTPATIENT
Start: 2022-03-18 | End: 2022-03-18 | Stop reason: HOSPADM

## 2022-03-18 RX ORDER — KETOROLAC TROMETHAMINE 30 MG/ML
INJECTION, SOLUTION INTRAMUSCULAR; INTRAVENOUS AS NEEDED
Status: DISCONTINUED | OUTPATIENT
Start: 2022-03-18 | End: 2022-03-18 | Stop reason: SURG

## 2022-03-18 RX ORDER — CIPROFLOXACIN 500 MG/1
500 TABLET, FILM COATED ORAL 2 TIMES DAILY
Qty: 6 TABLET | Refills: 0 | Status: SHIPPED | OUTPATIENT
Start: 2022-03-18 | End: 2022-05-23

## 2022-03-18 RX ORDER — NALOXONE HCL 0.4 MG/ML
0.2 VIAL (ML) INJECTION AS NEEDED
Status: DISCONTINUED | OUTPATIENT
Start: 2022-03-18 | End: 2022-03-18 | Stop reason: HOSPADM

## 2022-03-18 RX ORDER — LEVOFLOXACIN 5 MG/ML
500 INJECTION, SOLUTION INTRAVENOUS ONCE
Status: COMPLETED | OUTPATIENT
Start: 2022-03-18 | End: 2022-03-18

## 2022-03-18 RX ORDER — HYDROMORPHONE HYDROCHLORIDE 1 MG/ML
0.25 INJECTION, SOLUTION INTRAMUSCULAR; INTRAVENOUS; SUBCUTANEOUS
Status: DISCONTINUED | OUTPATIENT
Start: 2022-03-18 | End: 2022-03-18 | Stop reason: HOSPADM

## 2022-03-18 RX ORDER — HYDRALAZINE HYDROCHLORIDE 20 MG/ML
5 INJECTION INTRAMUSCULAR; INTRAVENOUS
Status: DISCONTINUED | OUTPATIENT
Start: 2022-03-18 | End: 2022-03-18 | Stop reason: HOSPADM

## 2022-03-18 RX ORDER — EPHEDRINE SULFATE 50 MG/ML
5 INJECTION, SOLUTION INTRAVENOUS ONCE AS NEEDED
Status: DISCONTINUED | OUTPATIENT
Start: 2022-03-18 | End: 2022-03-18 | Stop reason: HOSPADM

## 2022-03-18 RX ORDER — SODIUM CHLORIDE 0.9 % (FLUSH) 0.9 %
3-10 SYRINGE (ML) INJECTION AS NEEDED
Status: DISCONTINUED | OUTPATIENT
Start: 2022-03-18 | End: 2022-03-18 | Stop reason: HOSPADM

## 2022-03-18 RX ORDER — LABETALOL HYDROCHLORIDE 5 MG/ML
5 INJECTION, SOLUTION INTRAVENOUS
Status: DISCONTINUED | OUTPATIENT
Start: 2022-03-18 | End: 2022-03-18 | Stop reason: HOSPADM

## 2022-03-18 RX ORDER — DIPHENHYDRAMINE HCL 25 MG
25 CAPSULE ORAL
Status: DISCONTINUED | OUTPATIENT
Start: 2022-03-18 | End: 2022-03-18 | Stop reason: HOSPADM

## 2022-03-18 RX ORDER — SODIUM CHLORIDE 0.9 % (FLUSH) 0.9 %
3 SYRINGE (ML) INJECTION EVERY 12 HOURS SCHEDULED
Status: DISCONTINUED | OUTPATIENT
Start: 2022-03-18 | End: 2022-03-18 | Stop reason: HOSPADM

## 2022-03-18 RX ORDER — MIDAZOLAM HYDROCHLORIDE 1 MG/ML
0.5 INJECTION INTRAMUSCULAR; INTRAVENOUS
Status: DISCONTINUED | OUTPATIENT
Start: 2022-03-18 | End: 2022-03-18 | Stop reason: HOSPADM

## 2022-03-18 RX ORDER — ONDANSETRON 2 MG/ML
INJECTION INTRAMUSCULAR; INTRAVENOUS AS NEEDED
Status: DISCONTINUED | OUTPATIENT
Start: 2022-03-18 | End: 2022-03-18 | Stop reason: SURG

## 2022-03-18 RX ORDER — LIDOCAINE HYDROCHLORIDE 20 MG/ML
INJECTION, SOLUTION INFILTRATION; PERINEURAL AS NEEDED
Status: DISCONTINUED | OUTPATIENT
Start: 2022-03-18 | End: 2022-03-18 | Stop reason: SURG

## 2022-03-18 RX ADMIN — PROPOFOL 50 MG: 10 INJECTION, EMULSION INTRAVENOUS at 15:27

## 2022-03-18 RX ADMIN — MIDAZOLAM 0.5 MG: 1 INJECTION INTRAMUSCULAR; INTRAVENOUS at 14:24

## 2022-03-18 RX ADMIN — PROPOFOL 160 MCG/KG/MIN: 10 INJECTION, EMULSION INTRAVENOUS at 15:27

## 2022-03-18 RX ADMIN — LIDOCAINE HYDROCHLORIDE 100 MG: 20 INJECTION, SOLUTION INFILTRATION; PERINEURAL at 15:27

## 2022-03-18 RX ADMIN — ONDANSETRON 4 MG: 2 INJECTION INTRAMUSCULAR; INTRAVENOUS at 15:46

## 2022-03-18 RX ADMIN — KETOROLAC TROMETHAMINE 30 MG: 30 INJECTION, SOLUTION INTRAMUSCULAR at 15:46

## 2022-03-18 RX ADMIN — LEVOFLOXACIN 500 MG: 500 INJECTION, SOLUTION INTRAVENOUS at 15:02

## 2022-03-18 RX ADMIN — SODIUM CHLORIDE, POTASSIUM CHLORIDE, SODIUM LACTATE AND CALCIUM CHLORIDE 9 ML/HR: 600; 310; 30; 20 INJECTION, SOLUTION INTRAVENOUS at 13:52

## 2022-03-18 RX ADMIN — FAMOTIDINE 20 MG: 10 INJECTION INTRAVENOUS at 14:24

## 2022-03-18 NOTE — ANESTHESIA PREPROCEDURE EVALUATION
Anesthesia Evaluation     Patient summary reviewed and Nursing notes reviewed   no history of anesthetic complications:  NPO Solid Status: > 8 hours  NPO Liquid Status: > 8 hours           Airway   Mallampati: II  Dental - normal exam     Pulmonary - normal exam   (+) COPD, asthma,sleep apnea,   Cardiovascular - negative cardio ROS and normal exam        Neuro/Psych  (+) psychiatric history Depression,    GI/Hepatic/Renal/Endo    (+)  GERD,  thyroid problem hypothyroidism    Musculoskeletal     Abdominal    Substance History      OB/GYN          Other   arthritis,    history of cancer remission                    Anesthesia Plan    ASA 2     MAC     intravenous induction     Anesthetic plan, all risks, benefits, and alternatives have been provided, discussed and informed consent has been obtained with: patient.        CODE STATUS:

## 2022-03-18 NOTE — ANESTHESIA POSTPROCEDURE EVALUATION
"Patient: Girish Greenberg    Procedure Summary     Date: 03/18/22 Room / Location: [facility] OR 09 / [facility] MAIN OR    Anesthesia Start: 1517 Anesthesia Stop: 1605    Procedure: INTERSTIM REMOVAL (N/A ) Diagnosis:     Surgeons: Slade Borrego MD Provider: Jacki Cagle MD    Anesthesia Type: MAC ASA Status: 2          Anesthesia Type: MAC    Vitals  Vitals Value Taken Time   /90 03/18/22 1633   Temp 36.4 °C (97.5 °F) 03/18/22 1603   Pulse 60 03/18/22 1634   Resp 16 03/18/22 1632   SpO2 97 % 03/18/22 1635   Vitals shown include unvalidated device data.        Post Anesthesia Care and Evaluation    Patient participation: complete - patient participated  Level of consciousness: awake  Pain management: adequate  Airway patency: patent  Anesthetic complications: No anesthetic complications  PONV Status: none  Cardiovascular status: acceptable  Respiratory status: acceptable  Hydration status: acceptable    Comments: /90   Pulse 61   Temp 36.4 °C (97.5 °F) (Oral)   Resp 16   Ht 182.9 cm (72\")   Wt 97.8 kg (215 lb 9.8 oz)   SpO2 98%   BMI 29.24 kg/m²         "

## 2022-03-21 ENCOUNTER — HOSPITAL ENCOUNTER (OUTPATIENT)
Dept: GENERAL RADIOLOGY | Facility: HOSPITAL | Age: 67
Discharge: HOME | End: 2022-03-21
Admitting: INTERNAL MEDICINE

## 2022-03-21 DIAGNOSIS — R06.00 DYSPNEA, UNSPECIFIED TYPE: ICD-10-CM

## 2022-03-21 PROCEDURE — 76000 FLUOROSCOPY <1 HR PHYS/QHP: CPT

## 2022-03-24 RX ORDER — LEVOTHYROXINE SODIUM 0.05 MG/1
50 TABLET ORAL DAILY
Qty: 90 TABLET | Refills: 1 | Status: SHIPPED | OUTPATIENT
Start: 2022-03-24 | End: 2022-12-22

## 2022-05-17 ENCOUNTER — TRANSCRIBE ORDERS (OUTPATIENT)
Dept: ADMINISTRATIVE | Facility: HOSPITAL | Age: 67
End: 2022-05-17

## 2022-05-17 DIAGNOSIS — R09.02 HYPOXIA: Primary | ICD-10-CM

## 2022-05-19 ENCOUNTER — HOSPITAL ENCOUNTER (OUTPATIENT)
Dept: CT IMAGING | Facility: HOSPITAL | Age: 67
Discharge: HOME | End: 2022-05-19
Admitting: INTERNAL MEDICINE

## 2022-05-19 DIAGNOSIS — R09.02 HYPOXIA: ICD-10-CM

## 2022-05-19 PROCEDURE — 82565 ASSAY OF CREATININE: CPT

## 2022-05-19 PROCEDURE — 71275 CT ANGIOGRAPHY CHEST: CPT

## 2022-05-19 PROCEDURE — 0 IOPAMIDOL PER 1 ML: Performed by: INTERNAL MEDICINE

## 2022-05-19 RX ADMIN — IOPAMIDOL 95 ML: 755 INJECTION, SOLUTION INTRAVENOUS at 08:08

## 2022-05-20 LAB — CREAT BLDA-MCNC: 0.8 MG/DL (ref 0.6–1.3)

## 2022-05-23 ENCOUNTER — OFFICE VISIT (OUTPATIENT)
Dept: INTERNAL MEDICINE | Facility: CLINIC | Age: 67
End: 2022-05-23

## 2022-05-23 ENCOUNTER — TELEPHONE (OUTPATIENT)
Dept: INTERNAL MEDICINE | Facility: CLINIC | Age: 67
End: 2022-05-23

## 2022-05-23 VITALS
HEART RATE: 98 BPM | OXYGEN SATURATION: 96 % | WEIGHT: 216 LBS | DIASTOLIC BLOOD PRESSURE: 76 MMHG | TEMPERATURE: 97.2 F | BODY MASS INDEX: 29.26 KG/M2 | SYSTOLIC BLOOD PRESSURE: 110 MMHG | HEIGHT: 72 IN

## 2022-05-23 DIAGNOSIS — J44.1 COPD WITH ACUTE EXACERBATION: Primary | ICD-10-CM

## 2022-05-23 LAB
EXPIRATION DATE: NORMAL
FLUAV AG NPH QL: NEGATIVE
FLUBV AG NPH QL: NEGATIVE
INTERNAL CONTROL: NORMAL
Lab: NORMAL

## 2022-05-23 PROCEDURE — 87804 INFLUENZA ASSAY W/OPTIC: CPT | Performed by: STUDENT IN AN ORGANIZED HEALTH CARE EDUCATION/TRAINING PROGRAM

## 2022-05-23 PROCEDURE — 99214 OFFICE O/P EST MOD 30 MIN: CPT | Performed by: STUDENT IN AN ORGANIZED HEALTH CARE EDUCATION/TRAINING PROGRAM

## 2022-05-23 RX ORDER — PREDNISONE 20 MG/1
40 TABLET ORAL DAILY
Qty: 10 TABLET | Refills: 0 | Status: SHIPPED | OUTPATIENT
Start: 2022-05-23 | End: 2022-05-28

## 2022-05-23 RX ORDER — AZITHROMYCIN 250 MG/1
TABLET, FILM COATED ORAL
Qty: 6 TABLET | Refills: 0 | Status: SHIPPED | OUTPATIENT
Start: 2022-05-23

## 2022-05-23 NOTE — TELEPHONE ENCOUNTER
Caller: Girish Greenberg    Relationship: Self    Best call back number: [phone]    What medication are you requesting: PCP RECOMMENDATION     What are your current symptoms: CHEST CONGESTION, SORE THROAT, EARACHE     How long have you been experiencing symptoms: 5 DAYS     Have you had these symptoms before:    [x] Yes  [] No    Have you been treated for these symptoms before:   [x] Yes  [] No    If a prescription is needed, what is your preferred pharmacy and phone number: [id number] - [place], [place] - [address] AT [facility] [phone] [facility] [phone]      Additional notes: PT TOOK AT HOME COVID TEST ON 5/22/22 AND IT WAS NEGATIVE.
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
## 2022-05-25 DIAGNOSIS — C61 PROSTATE CANCER: Primary | ICD-10-CM

## 2022-05-25 DIAGNOSIS — E53.8 B12 DEFICIENCY: ICD-10-CM

## 2022-05-25 DIAGNOSIS — E03.9 HYPOTHYROIDISM, UNSPECIFIED TYPE: ICD-10-CM

## 2022-06-02 LAB
ALBUMIN SERPL-MCNC: 3.7 G/DL (ref 3.8–4.8)
ALBUMIN/GLOB SERPL: 1.8 {RATIO} (ref 1.2–2.2)
ALP SERPL-CCNC: 37 IU/L (ref 44–121)
ALT SERPL-CCNC: 15 IU/L (ref 0–44)
AST SERPL-CCNC: 16 IU/L (ref 0–40)
BILIRUB SERPL-MCNC: 0.2 MG/DL (ref 0–1.2)
BUN SERPL-MCNC: 25 MG/DL (ref 8–27)
BUN/CREAT SERPL: 25 (ref 10–24)
CALCIUM SERPL-MCNC: 9 MG/DL (ref 8.6–10.2)
CHLORIDE SERPL-SCNC: 106 MMOL/L (ref 96–106)
CHOLEST SERPL-MCNC: 195 MG/DL (ref 100–199)
CO2 SERPL-SCNC: 20 MMOL/L (ref 20–29)
CREAT SERPL-MCNC: 0.99 MG/DL (ref 0.76–1.27)
EGFRCR SERPLBLD CKD-EPI 2021: 84 ML/MIN/1.73
GLOBULIN SER CALC-MCNC: 2.1 G/DL (ref 1.5–4.5)
GLUCOSE SERPL-MCNC: 94 MG/DL (ref 65–99)
HDLC SERPL-MCNC: 67 MG/DL
LDLC SERPL CALC-MCNC: 101 MG/DL (ref 0–99)
LDLC/HDLC SERPL: 1.5 RATIO (ref 0–3.6)
POTASSIUM SERPL-SCNC: 4.5 MMOL/L (ref 3.5–5.2)
PROT SERPL-MCNC: 5.8 G/DL (ref 6–8.5)
SODIUM SERPL-SCNC: 139 MMOL/L (ref 134–144)
TRIGL SERPL-MCNC: 159 MG/DL (ref 0–149)
TSH SERPL DL<=0.005 MIU/L-ACNC: 1.44 UIU/ML (ref 0.45–4.5)
VIT B12 SERPL-MCNC: 1077 PG/ML (ref 232–1245)
VLDLC SERPL CALC-MCNC: 27 MG/DL (ref 5–40)

## 2022-06-06 ENCOUNTER — OFFICE VISIT (OUTPATIENT)
Dept: INTERNAL MEDICINE | Facility: CLINIC | Age: 67
End: 2022-06-06

## 2022-06-06 VITALS
WEIGHT: 200 LBS | SYSTOLIC BLOOD PRESSURE: 128 MMHG | BODY MASS INDEX: 27.09 KG/M2 | DIASTOLIC BLOOD PRESSURE: 78 MMHG | HEART RATE: 89 BPM | HEIGHT: 72 IN

## 2022-06-06 DIAGNOSIS — G89.29 CHRONIC BILATERAL LOW BACK PAIN WITHOUT SCIATICA: ICD-10-CM

## 2022-06-06 DIAGNOSIS — E78.5 HYPERLIPIDEMIA, UNSPECIFIED HYPERLIPIDEMIA TYPE: ICD-10-CM

## 2022-06-06 DIAGNOSIS — M54.50 CHRONIC BILATERAL LOW BACK PAIN WITHOUT SCIATICA: ICD-10-CM

## 2022-06-06 DIAGNOSIS — E03.9 HYPOTHYROIDISM, UNSPECIFIED TYPE: Primary | ICD-10-CM

## 2022-06-06 PROCEDURE — 99214 OFFICE O/P EST MOD 30 MIN: CPT | Performed by: INTERNAL MEDICINE

## 2022-06-06 RX ORDER — DULOXETIN HYDROCHLORIDE 30 MG/1
30 CAPSULE, DELAYED RELEASE ORAL DAILY
Qty: 90 CAPSULE | Refills: 3 | Status: SHIPPED | OUTPATIENT
Start: 2022-06-06 | End: 2022-08-19 | Stop reason: SDUPTHER

## 2022-06-06 NOTE — PROGRESS NOTES
Chief Complaint   Patient presents with   • Hypothyroidism   • Hyperlipidemia   • Osteoarthritis       History of Present Illness   Girish Greenberg is a 66 y.o. male presents for routine follow up evalution. Has history prostate cancer, arthritis, scar, hypothyroidism, kalpana. TSH is at goal level. Has a history of KALPANA but is not on cpap therapy. He is to have a sleep study in a couple of months to check for kalpana or deoxygenation. He had a lung CTA this did show a 7,, nodule that is to be retested in 6 months. Some cardiac calcification noted but not quantified. He is taking red yeast rice for lipids and ldl is 100. He has low back pain. He is getting injections routinely. He reports his daughter thinks he should also be taking cymbalta.           The following portions of the patient's history were reviewed and updated as appropriate: allergies, current medications, past family history, past medical history, past social history, past surgical history and problem list.  Current Outpatient Medications on File Prior to Visit   Medication Sig Dispense Refill   • albuterol (PROVENTIL HFA;VENTOLIN HFA) 108 (90 BASE) MCG/ACT inhaler Inhale 2 puffs Every 6 (Six) Hours As Needed for shortness of air.     • Ascorbic Acid (VITAMIN C PO) Take 1 tablet by mouth Daily. HOLDING FOR OR     • BLACK ELDERBERRY PO Take 1 tablet by mouth Every Night. HOLDING FOR OR     • budesonide-formoterol (SYMBICORT) 160-4.5 MCG/ACT inhaler Inhale 2 puffs As Needed.     • Cholecalciferol (VITAMIN D PO) Take 1 capsule by mouth Daily. HOLDING FOR OR     • Coenzyme Q10 (CO Q 10 PO) Take 1 capsule by mouth Daily. HOLD FOR SURGERY     • Cyanocobalamin (B-12 PO) Take 1 capsule by mouth Daily. HOLDING FOR OR     • Green Tea, Camillia sinensis, (GREEN TEA PO) Take 1 tablet by mouth Daily. HOLD FOR SURGERY     • levothyroxine (SYNTHROID, LEVOTHROID) 50 MCG tablet Take 1 tablet by mouth Daily. 90 tablet 1   • Red Yeast Rice Extract (RED YEAST RICE PO) Take 1  capsule by mouth Daily. HOLD FOR SURGERY     • TURMERIC CURCUMIN PO Take 1 capsule by mouth Daily. HOLD FOR SURGERY     • azithromycin (Zithromax Z-Shahram) 250 MG tablet Take 2 tablets by mouth on day 1, then 1 tablet daily on days 2-5 6 tablet 0     No current facility-administered medications on file prior to visit.     Review of Systems   Constitutional: Negative.    HENT: Negative.    Eyes: Negative.    Respiratory: Negative.    Cardiovascular: Negative.    Gastrointestinal: Negative.    Endocrine: Negative.    Genitourinary: Negative.    Musculoskeletal: Negative.    Skin: Negative.    Allergic/Immunologic: Negative.    Neurological: Negative.    Hematological: Negative.    Psychiatric/Behavioral: Negative.        Objective   Physical Exam  Vitals and nursing note reviewed.   Constitutional:       Appearance: Normal appearance. He is well-developed.   HENT:      Head: Normocephalic and atraumatic.      Right Ear: Tympanic membrane and external ear normal.      Left Ear: Tympanic membrane and external ear normal.      Nose: Nose normal.      Mouth/Throat:      Mouth: Mucous membranes are moist.   Eyes:      Extraocular Movements: Extraocular movements intact.      Pupils: Pupils are equal, round, and reactive to light.   Cardiovascular:      Rate and Rhythm: Normal rate and regular rhythm.      Pulses: Normal pulses.      Heart sounds: Normal heart sounds.   Pulmonary:      Effort: Pulmonary effort is normal. No respiratory distress.      Breath sounds: Normal breath sounds.   Abdominal:      General: Abdomen is flat.      Palpations: Abdomen is soft.   Musculoskeletal:         General: Normal range of motion.      Cervical back: Normal range of motion and neck supple.   Skin:     General: Skin is warm and dry.   Neurological:      General: No focal deficit present.      Mental Status: He is alert and oriented to person, place, and time.   Psychiatric:         Mood and Affect: Mood normal.         Behavior:  "Behavior normal.         Thought Content: Thought content normal.         Judgment: Judgment normal.          /78   Pulse 89   Ht 182.9 cm (72\")   Wt 90.7 kg (200 lb)   BMI 27.12 kg/m²     Assessment & Plan   Diagnoses and all orders for this visit:    Hypothyroidism, unspecified type    Chronic bilateral low back pain without sciatica    Hyperlipidemia, unspecified hyperlipidemia type      Patient w/ hypothyroidism. He will continue current dosing of  Synthroid. Will continue epidural injections through pain management. He will try cymbalta at 30 mg daily and advance to 60 mg if needed. He will f/u w/ pulmonolgy regarding kalpana/ evening hypoxia. He will get to get a ct cardiac calcium testing given calcification noted on ct scan. Will quantify this. Continue red yeast rice for now but may consider statin therapy.            "

## 2022-07-14 ENCOUNTER — TRANSCRIBE ORDERS (OUTPATIENT)
Dept: SLEEP MEDICINE | Facility: HOSPITAL | Age: 67
End: 2022-07-14

## 2022-07-14 ENCOUNTER — HOSPITAL ENCOUNTER (OUTPATIENT)
Dept: SLEEP MEDICINE | Facility: HOSPITAL | Age: 67
Discharge: HOME | End: 2022-07-14
Admitting: INTERNAL MEDICINE

## 2022-07-14 VITALS — HEIGHT: 72 IN | WEIGHT: 200 LBS | BODY MASS INDEX: 27.09 KG/M2

## 2022-07-14 DIAGNOSIS — R09.02 HYPOXEMIA: Primary | ICD-10-CM

## 2022-07-14 DIAGNOSIS — R09.02 HYPOXEMIA: ICD-10-CM

## 2022-07-14 PROCEDURE — 95810 POLYSOM 6/> YRS 4/> PARAM: CPT

## 2022-08-09 ENCOUNTER — TELEPHONE (OUTPATIENT)
Dept: INTERNAL MEDICINE | Facility: CLINIC | Age: 67
End: 2022-08-09

## 2022-08-09 NOTE — TELEPHONE ENCOUNTER
Caller: Girish Greenberg    Relationship: Self    Best call back number:[phone]    What is the best time to reach you: ANYTIME    Who are you requesting to speak with (clinical staff, provider,  specific staff member): DR OMALLEY    What was the call regarding: PATIENT STATES THAT HE GOT A SLEEP STUDY DONE WAS TOLD TO CONTACT HIS DOCTOR TO READ AND REVIEW IT.PATIENT REQUESTING A CALL BACK WITH ANY COMMENTS OR IF ANY STEPS NEED TO BE TAKEN.

## 2022-08-19 ENCOUNTER — TELEPHONE (OUTPATIENT)
Dept: INTERNAL MEDICINE | Facility: CLINIC | Age: 67
End: 2022-08-19

## 2022-08-19 RX ORDER — DULOXETIN HYDROCHLORIDE 30 MG/1
30 CAPSULE, DELAYED RELEASE ORAL DAILY
Qty: 90 CAPSULE | Refills: 3 | Status: SHIPPED | OUTPATIENT
Start: 2022-08-19 | End: 2022-09-01 | Stop reason: DRUGHIGH

## 2022-08-19 NOTE — TELEPHONE ENCOUNTER
Caller: Girish Greenberg    Relationship: Self    Best call back number: [phone]    Requested Prescriptions:       DULoxetine (Cymbalta) 30 MG capsule     Pharmacy where request should be sent:    [facility]/pharm[facility] #[id number] - [place], [place] - [address] AT [facility] - [phone] [facility] [phone]   [phone] details provided by patient:PATIENT IS CALLING TO STATE HE HAS BEEN TAKING 2 (30 MG) TABLETS AND IT HAS BEEN WORKING.  HE IS WANTING TO KNOW IF DR. OMALLEY WOULD BE WILLING TO INCREASE THE DOSAGE  PATIENT STATES HE TOOK LAST OF THE MEDICATION TODAY.      Does the patient have less than a 3 day supply:  [x] Yes  [] No    Mita Hunter, RegSched Rep   08/19/22 15:54 EDT     PLEASE ADVISE.

## 2022-08-29 NOTE — TELEPHONE ENCOUNTER
PATIENT STATES HE HAS ZACARIAS OUT FOR OVER A WEEK AND A HALF THE PHARMACY JUST TOLD HIM TODAY THEY WOULD NOT BE ABLE TO REFILL THIS TILL 09/07 PATIENT IS OUT BECAUSE HE HAS BEEN TAKING DOUBLE THE PRESCRIBED DOSE SO THEY ONLY LASTED HALF THE TIME. H STATES  SAID SHE COULD INCREASE IT IF NEEDED SO HE IS REQUESTING HER TO SEND HIM IN A SCRIPT OF THIS MED FOR 60MG ONCE A DAY. ASAP.     PATIENT> [phone]    ALSO PATIENT IS USING NEW PHARMACY NOW    [facility]/pharmacy #[id number] - [place], [place] - [address] - [phone] [facility] [phone]   [phone]

## 2022-08-31 ENCOUNTER — TELEPHONE (OUTPATIENT)
Dept: INTERNAL MEDICINE | Facility: CLINIC | Age: 67
End: 2022-08-31

## 2022-08-31 NOTE — TELEPHONE ENCOUNTER
Caller: Girish Greenberg    Relationship: Self    Best call back number: [phone]     PATIENT IS WONDERING IF WE CAN CALL IN A SCRIPT FOR A HEMORRHOID.     HE HAS HAD IT FOR 2 WEEKS NOW AND ITS REALLY BOTHERING HIM.    [id number] - [place], [place] - [address] [phone] [facility] [phone]   [phone]    PLEASE ADVISE

## 2022-08-31 NOTE — TELEPHONE ENCOUNTER
Caller: Girish Greenberg    Relationship: Self    Best call back number: [phone] (M)    PATIENT RAN OUT OF THIS MEDICATION 2 WEEKS AGO AND IS STILL WANTING TO SEE IF DR OMALLEY WOULD CALL IN A NEW SCRIPT FOR A 60 MG DOSAGE, ONCE A DAY    PHARMACY WOULDN'T FILL THIS UNTIL 09/07/22    THIS NEEDS TO GO TO [facility] [address] IN [place], PER PATIENTS REQUEST TODAY 08/31/22     [facility]/pharmacy #[id number] - [place], [place] - [address] [phone] [facility] [phone]   [phone]

## 2022-09-01 RX ORDER — HYDROCORTISONE ACETATE PRAMOXINE HCL 2.5; 1 G/100G; G/100G
CREAM TOPICAL 3 TIMES DAILY
Qty: 30 G | Refills: 2 | Status: SHIPPED | OUTPATIENT
Start: 2022-09-01

## 2022-09-01 RX ORDER — DULOXETIN HYDROCHLORIDE 60 MG/1
60 CAPSULE, DELAYED RELEASE ORAL DAILY
Qty: 90 CAPSULE | Refills: 1 | Status: SHIPPED | OUTPATIENT
Start: 2022-09-01 | End: 2023-01-13

## 2022-09-22 ENCOUNTER — APPOINTMENT (OUTPATIENT)
Dept: PREADMISSION TESTING | Facility: HOSPITAL | Age: 67
End: 2022-09-22

## 2022-09-23 ENCOUNTER — TRANSCRIBE ORDERS (OUTPATIENT)
Dept: ADMINISTRATIVE | Facility: HOSPITAL | Age: 67
End: 2022-09-23

## 2022-09-23 DIAGNOSIS — R91.1 PULMONARY NODULE: Primary | ICD-10-CM

## 2022-11-29 ENCOUNTER — TRANSCRIBE ORDERS (OUTPATIENT)
Dept: ADMINISTRATIVE | Facility: HOSPITAL | Age: 67
End: 2022-11-29

## 2022-11-29 DIAGNOSIS — M84.48XA SACRAL INSUFFICIENCY FRACTURE, INITIAL ENCOUNTER: Primary | ICD-10-CM

## 2022-12-07 DIAGNOSIS — Z12.5 SCREENING FOR PROSTATE CANCER: ICD-10-CM

## 2022-12-07 DIAGNOSIS — E05.90 HYPERTHYROIDISM: Primary | ICD-10-CM

## 2022-12-12 ENCOUNTER — HOSPITAL ENCOUNTER (OUTPATIENT)
Dept: BONE DENSITY | Facility: HOSPITAL | Age: 67
Discharge: HOME | End: 2022-12-12
Admitting: ORTHOPAEDIC SURGERY

## 2022-12-12 DIAGNOSIS — M84.48XA SACRAL INSUFFICIENCY FRACTURE, INITIAL ENCOUNTER: ICD-10-CM

## 2022-12-12 PROCEDURE — 77080 DXA BONE DENSITY AXIAL: CPT

## 2022-12-14 ENCOUNTER — LAB (OUTPATIENT)
Dept: LAB | Facility: HOSPITAL | Age: 67
End: 2022-12-14

## 2022-12-14 LAB
ALBUMIN SERPL-MCNC: 4 G/DL (ref 3.5–5.2)
ALBUMIN/GLOB SERPL: 1.4 G/DL
ALP SERPL-CCNC: 47 U/L (ref 39–117)
ALT SERPL W P-5'-P-CCNC: 12 U/L (ref 1–41)
ANION GAP SERPL CALCULATED.3IONS-SCNC: 7 MMOL/L (ref 5–15)
AST SERPL-CCNC: 12 U/L (ref 1–40)
BACTERIA UR QL AUTO: ABNORMAL /HPF
BASOPHILS # BLD AUTO: 0.07 10*3/MM3 (ref 0–0.2)
BASOPHILS NFR BLD AUTO: 1 % (ref 0–1.5)
BILIRUB SERPL-MCNC: 0.2 MG/DL (ref 0–1.2)
BILIRUB UR QL STRIP: NEGATIVE
BUN SERPL-MCNC: 22 MG/DL (ref 8–23)
BUN/CREAT SERPL: 23.4 (ref 7–25)
CALCIUM SPEC-SCNC: 9.1 MG/DL (ref 8.6–10.5)
CHLORIDE SERPL-SCNC: 104 MMOL/L (ref 98–107)
CHOLEST SERPL-MCNC: 169 MG/DL (ref 0–200)
CLARITY UR: CLEAR
CO2 SERPL-SCNC: 26 MMOL/L (ref 22–29)
COLOR UR: YELLOW
CREAT SERPL-MCNC: 0.94 MG/DL (ref 0.76–1.27)
DEPRECATED RDW RBC AUTO: 38 FL (ref 37–54)
EGFRCR SERPLBLD CKD-EPI 2021: 88.9 ML/MIN/1.73
EOSINOPHIL # BLD AUTO: 0.14 10*3/MM3 (ref 0–0.4)
EOSINOPHIL NFR BLD AUTO: 1.9 % (ref 0.3–6.2)
ERYTHROCYTE [DISTWIDTH] IN BLOOD BY AUTOMATED COUNT: 12 % (ref 12.3–15.4)
GLOBULIN UR ELPH-MCNC: 2.8 GM/DL
GLUCOSE SERPL-MCNC: 99 MG/DL (ref 65–99)
GLUCOSE UR STRIP-MCNC: NEGATIVE MG/DL
HCT VFR BLD AUTO: 36.2 % (ref 37.5–51)
HDLC SERPL-MCNC: 64 MG/DL (ref 40–60)
HGB BLD-MCNC: 11.8 G/DL (ref 13–17.7)
HGB UR QL STRIP.AUTO: ABNORMAL
HYALINE CASTS UR QL AUTO: ABNORMAL /LPF
IMM GRANULOCYTES # BLD AUTO: 0.03 10*3/MM3 (ref 0–0.05)
IMM GRANULOCYTES NFR BLD AUTO: 0.4 % (ref 0–0.5)
KETONES UR QL STRIP: ABNORMAL
LDLC SERPL CALC-MCNC: 91 MG/DL (ref 0–100)
LDLC/HDLC SERPL: 1.41 {RATIO}
LEUKOCYTE ESTERASE UR QL STRIP.AUTO: ABNORMAL
LYMPHOCYTES # BLD AUTO: 1.44 10*3/MM3 (ref 0.7–3.1)
LYMPHOCYTES NFR BLD AUTO: 20.1 % (ref 19.6–45.3)
MCH RBC QN AUTO: 28.3 PG (ref 26.6–33)
MCHC RBC AUTO-ENTMCNC: 32.6 G/DL (ref 31.5–35.7)
MCV RBC AUTO: 86.8 FL (ref 79–97)
MONOCYTES # BLD AUTO: 0.55 10*3/MM3 (ref 0.1–0.9)
MONOCYTES NFR BLD AUTO: 7.7 % (ref 5–12)
NEUTROPHILS NFR BLD AUTO: 4.95 10*3/MM3 (ref 1.7–7)
NEUTROPHILS NFR BLD AUTO: 68.9 % (ref 42.7–76)
NITRITE UR QL STRIP: POSITIVE
NRBC BLD AUTO-RTO: 0 /100 WBC (ref 0–0.2)
PH UR STRIP.AUTO: 5.5 [PH] (ref 5–8)
PLATELET # BLD AUTO: 456 10*3/MM3 (ref 140–450)
PMV BLD AUTO: 9.5 FL (ref 6–12)
POTASSIUM SERPL-SCNC: 4.5 MMOL/L (ref 3.5–5.2)
PROT SERPL-MCNC: 6.8 G/DL (ref 6–8.5)
PROT UR QL STRIP: ABNORMAL
PSA SERPL-MCNC: 0.41 NG/ML (ref 0–4)
RBC # BLD AUTO: 4.17 10*6/MM3 (ref 4.14–5.8)
RBC # UR STRIP: ABNORMAL /HPF
REF LAB TEST METHOD: ABNORMAL
SODIUM SERPL-SCNC: 137 MMOL/L (ref 136–145)
SP GR UR STRIP: 1.02 (ref 1–1.03)
SQUAMOUS #/AREA URNS HPF: ABNORMAL /HPF
TRIGL SERPL-MCNC: 73 MG/DL (ref 0–150)
TSH SERPL DL<=0.05 MIU/L-ACNC: 1.92 UIU/ML (ref 0.27–4.2)
UROBILINOGEN UR QL STRIP: ABNORMAL
VLDLC SERPL-MCNC: 14 MG/DL (ref 5–40)
WBC # UR STRIP: ABNORMAL /HPF
WBC NRBC COR # BLD: 7.18 10*3/MM3 (ref 3.4–10.8)

## 2022-12-14 PROCEDURE — 87186 SC STD MICRODIL/AGAR DIL: CPT | Performed by: INTERNAL MEDICINE

## 2022-12-14 PROCEDURE — 87086 URINE CULTURE/COLONY COUNT: CPT | Performed by: INTERNAL MEDICINE

## 2022-12-14 PROCEDURE — 81001 URINALYSIS AUTO W/SCOPE: CPT | Performed by: INTERNAL MEDICINE

## 2022-12-14 PROCEDURE — 85025 COMPLETE CBC W/AUTO DIFF WBC: CPT | Performed by: INTERNAL MEDICINE

## 2022-12-14 PROCEDURE — 87077 CULTURE AEROBIC IDENTIFY: CPT | Performed by: INTERNAL MEDICINE

## 2022-12-14 PROCEDURE — 36415 COLL VENOUS BLD VENIPUNCTURE: CPT

## 2022-12-14 PROCEDURE — 81003 URINALYSIS AUTO W/O SCOPE: CPT | Performed by: INTERNAL MEDICINE

## 2022-12-14 PROCEDURE — G0103 PSA SCREENING: HCPCS | Performed by: INTERNAL MEDICINE

## 2022-12-14 PROCEDURE — 80053 COMPREHEN METABOLIC PANEL: CPT | Performed by: INTERNAL MEDICINE

## 2022-12-14 PROCEDURE — 84443 ASSAY THYROID STIM HORMONE: CPT | Performed by: INTERNAL MEDICINE

## 2022-12-14 PROCEDURE — 80061 LIPID PANEL: CPT | Performed by: INTERNAL MEDICINE

## 2022-12-16 ENCOUNTER — TELEPHONE (OUTPATIENT)
Dept: INTERNAL MEDICINE | Facility: CLINIC | Age: 67
End: 2022-12-16

## 2022-12-16 DIAGNOSIS — D64.9 ANEMIA, UNSPECIFIED TYPE: Primary | ICD-10-CM

## 2022-12-16 LAB — BACTERIA SPEC AEROBE CULT: ABNORMAL

## 2022-12-16 RX ORDER — CEPHALEXIN 500 MG/1
500 CAPSULE ORAL 2 TIMES DAILY
Qty: 14 CAPSULE | Refills: 0 | Status: SHIPPED | OUTPATIENT
Start: 2022-12-16

## 2022-12-16 NOTE — TELEPHONE ENCOUNTER
----- Message from Regina Landis MD sent at 12/15/2022  9:33 PM EST -----  Patient's urine has bacteria. Is he having any symptosm?   jw

## 2022-12-19 LAB
APPEARANCE UR: ABNORMAL
BACTERIA #/AREA URNS HPF: ABNORMAL /[HPF]
BACTERIA UR CULT: ABNORMAL
BACTERIA UR CULT: ABNORMAL
BILIRUB UR QL STRIP: NEGATIVE
CASTS URNS QL MICRO: ABNORMAL /LPF
COLOR UR: YELLOW
EPI CELLS #/AREA URNS HPF: ABNORMAL /HPF (ref 0–10)
GLUCOSE UR QL STRIP: NEGATIVE
HGB UR QL STRIP: ABNORMAL
KETONES UR QL STRIP: ABNORMAL
LEUKOCYTE ESTERASE UR QL STRIP: ABNORMAL
MICRO URNS: ABNORMAL
NITRITE UR QL STRIP: POSITIVE
OTHER ANTIBIOTIC SUSC ISLT: ABNORMAL
PH UR STRIP: 5 [PH] (ref 5–7.5)
PROT UR QL STRIP: ABNORMAL
RBC #/AREA URNS HPF: ABNORMAL /HPF (ref 0–2)
SP GR UR STRIP: 1.02 (ref 1–1.03)
URINALYSIS REFLEX: ABNORMAL
UROBILINOGEN UR STRIP-MCNC: 0.2 MG/DL (ref 0.2–1)
WBC #/AREA URNS HPF: >30 /HPF (ref 0–5)

## 2022-12-20 ENCOUNTER — TELEPHONE (OUTPATIENT)
Dept: INTERNAL MEDICINE | Facility: CLINIC | Age: 67
End: 2022-12-20

## 2022-12-20 ENCOUNTER — OFFICE VISIT (OUTPATIENT)
Dept: INTERNAL MEDICINE | Facility: CLINIC | Age: 67
End: 2022-12-20

## 2022-12-20 VITALS
DIASTOLIC BLOOD PRESSURE: 84 MMHG | BODY MASS INDEX: 27.09 KG/M2 | HEIGHT: 72 IN | HEART RATE: 82 BPM | WEIGHT: 200 LBS | SYSTOLIC BLOOD PRESSURE: 138 MMHG

## 2022-12-20 DIAGNOSIS — E03.9 HYPOTHYROIDISM, UNSPECIFIED TYPE: ICD-10-CM

## 2022-12-20 DIAGNOSIS — Z00.00 HEALTHCARE MAINTENANCE: Primary | ICD-10-CM

## 2022-12-20 DIAGNOSIS — G47.00 INSOMNIA, UNSPECIFIED TYPE: ICD-10-CM

## 2022-12-20 DIAGNOSIS — D64.9 ANEMIA, UNSPECIFIED TYPE: ICD-10-CM

## 2022-12-20 DIAGNOSIS — S32.9XXD CLOSED NONDISPLACED FRACTURE OF PELVIS WITH ROUTINE HEALING, UNSPECIFIED PART OF PELVIS, SUBSEQUENT ENCOUNTER: ICD-10-CM

## 2022-12-20 PROCEDURE — 1170F FXNL STATUS ASSESSED: CPT | Performed by: INTERNAL MEDICINE

## 2022-12-20 PROCEDURE — 99214 OFFICE O/P EST MOD 30 MIN: CPT | Performed by: INTERNAL MEDICINE

## 2022-12-20 PROCEDURE — 1159F MED LIST DOCD IN RCRD: CPT | Performed by: INTERNAL MEDICINE

## 2022-12-20 PROCEDURE — G0439 PPPS, SUBSEQ VISIT: HCPCS | Performed by: INTERNAL MEDICINE

## 2022-12-20 RX ORDER — OXYCODONE HYDROCHLORIDE AND ACETAMINOPHEN 5; 325 MG/1; MG/1
1 TABLET ORAL EVERY 6 HOURS PRN
Qty: 30 TABLET | Refills: 0 | Status: SHIPPED | OUTPATIENT
Start: 2022-12-20

## 2022-12-20 NOTE — TELEPHONE ENCOUNTER
Ok for HUB to read and transfer:    Left voice mail for patient to call office and schedule a 3-4 week non fasting lab appointment. Lab order already in

## 2022-12-20 NOTE — TELEPHONE ENCOUNTER
----- Message from Ivis Whitaker MA sent at 12/20/2022  3:16 PM EST -----  Can you call and make lab appt    Labs already in    ----- Message -----  From: Regina Landis MD  Sent: 12/20/2022   1:09 PM EST  To: Ivis Whitaker MA    Plesa schedule for non fasting labs (ordered cbc, b12, folate, iron) in 3-4 weeks for new onsey mild anemia (likely related to fracture)  jw

## 2022-12-20 NOTE — PROGRESS NOTES
The ABCs of the Annual Wellness Visit  Subsequent Medicare Wellness Visit    Subjective    Girish Greenberg is a 67 y.o. male who presents for a Subsequent Medicare Wellness Visit, to review chronic issues, and to discuss acute needs. .    The following portions of the patient's history were reviewed and   updated as appropriate: allergies, current medications, past family history, past medical history, past social history, past surgical history and problem list.    Compared to one year ago, the patient feels his physical   health is better.    Compared to one year ago, the patient feels his mental   health is better.    Recent Hospitalizations:  He was not admitted to the hospital during the last year.       Current Medical Providers:  Patient Care Team:  Regina Landis MD as PCP - General (Internal Medicine)  Regina Landis MD as PCP - Family Medicine  Slade Borrego MD as Consulting Physician (Urology)  Brian Sabillon MD as Consulting Physician (General Surgery)    Outpatient Medications Prior to Visit   Medication Sig Dispense Refill   • albuterol (PROVENTIL HFA;VENTOLIN HFA) 108 (90 BASE) MCG/ACT inhaler Inhale 2 puffs Every 6 (Six) Hours As Needed for shortness of air.     • Ascorbic Acid (VITAMIN C PO) Take 1 tablet by mouth Daily. HOLDING FOR OR     • BLACK ELDERBERRY PO Take 1 tablet by mouth Every Night. HOLDING FOR OR     • budesonide-formoterol (SYMBICORT) 160-4.5 MCG/ACT inhaler Inhale 2 puffs As Needed.     • Cholecalciferol (VITAMIN D PO) Take 1 capsule by mouth Daily. HOLDING FOR OR     • Coenzyme Q10 (CO Q 10 PO) Take 1 capsule by mouth Daily. HOLD FOR SURGERY     • Cyanocobalamin (B-12 PO) Take 1 capsule by mouth Daily. HOLDING FOR OR     • Green Tea, Camillia sinensis, (GREEN TEA PO) Take 1 tablet by mouth Daily. HOLD FOR SURGERY     • Hydrocort-Pramoxine, Perianal, (ANALPRAM-HC) 2.5-1 % rectal cream Insert  into the rectum 3 (Three) Times a Day. 30 g 2   • levothyroxine (SYNTHROID,  "LEVOTHROID) 50 MCG tablet Take 1 tablet by mouth Daily. 90 tablet 1   • Red Yeast Rice Extract (RED YEAST RICE PO) Take 1 capsule by mouth Daily. HOLD FOR SURGERY     • TURMERIC CURCUMIN PO Take 1 capsule by mouth Daily. HOLD FOR SURGERY     • azithromycin (Zithromax Z-Shahram) 250 MG tablet Take 2 tablets by mouth on day 1, then 1 tablet daily on days 2-5 (Patient not taking: Reported on 7/14/2022) 6 tablet 0   • cephalexin (Keflex) 500 MG capsule Take 1 capsule by mouth 2 (Two) Times a Day. 14 capsule 0   • DULoxetine (Cymbalta) 60 MG capsule Take 1 capsule by mouth Daily. 90 capsule 1     No facility-administered medications prior to visit.       Opioid medication/s are on active medication list.  and I have evaluated his active treatment plan and pain score trends (see table).  There were no vitals filed for this visit.  I have reviewed the chart for potential of high risk medication and harmful drug interactions in the elderly.            Aspirin is not on active medication list.  Aspirin use is not indicated based on review of current medical condition/s. Risk of harm outweighs potential benefits.  .    Patient Active Problem List   Diagnosis   • Prostate cancer (HCC)   • Rib pain on left side   • Obstructive sleep apnea syndrome   • Osteoarthritis   • Reactive depression   • Bilateral low back pain without sciatica   • Family history of colon cancer     Advance Care Planning  Advance Directive is not on file.  ACP discussion was held with the patient during this visit. Patient has an advance directive (not in EMR), copy requested.     Objective    Vitals:    12/20/22 1107   BP: 138/84   Pulse: 82   Weight: 90.7 kg (200 lb)   Height: 182.9 cm (72\")     Estimated body mass index is 27.12 kg/m² as calculated from the following:    Height as of this encounter: 182.9 cm (72\").    Weight as of this encounter: 90.7 kg (200 lb).    BMI is >= 25 and <30. (Overweight) The following options were offered after discussion;: " exercise counseling/recommendations and nutrition counseling/recommendations      Does the patient have evidence of cognitive impairment? No    Lab Results   Component Value Date    TRIG 73 12/14/2022    HDL 64 (H) 12/14/2022    LDL 91 12/14/2022    VLDL 14 12/14/2022        HEALTH RISK ASSESSMENT    Smoking Status:  Social History     Tobacco Use   Smoking Status Never   Smokeless Tobacco Never     Alcohol Consumption:  Social History     Substance and Sexual Activity   Alcohol Use Not Currently   • Alcohol/week: 2.0 standard drinks   • Types: 2 Cans of beer per week    Comment: 2 PER DAY     Fall Risk Screen:    STEADI Fall Risk Assessment has not been completed.    Depression Screening:  PHQ-2/PHQ-9 Depression Screening 10/6/2021   Retired PHQ-9 Total Score 0   Retired Total Score 0       Health Habits and Functional and Cognitive Screening:  No flowsheet data found.    Age-appropriate Screening Schedule:  Refer to the list below for future screening recommendations based on patient's age, sex and/or medical conditions. Orders for these recommended tests are listed in the plan section. The patient has been provided with a written plan.    Health Maintenance   Topic Date Due   • ZOSTER VACCINE (1 of 2) Never done   • TDAP/TD VACCINES (4 - Td or Tdap) 09/04/2028   • INFLUENZA VACCINE  Completed                CMS Preventative Services Quick Reference  Risk Factors Identified During Encounter  Fall Risk-High or Moderate: Discussed Fall Prevention in the home and Referral Placed for Physical Therapy  Inactivity/Sedentary: Patient was advised to exercise at least 150 minutes a week per CDC recommendations.  The above risks/problems have been discussed with the patient.  Pertinent information has been shared with the patient in the After Visit Summary.  An After Visit Summary and PPPS were made available to the patient.    Follow Up:   Next Medicare Wellness visit to be scheduled in 1 year.       Additional E&M Note  "during same encounter follows:  Patient has multiple medical problems which are significant and separately identifiable that require additional work above and beyond the Medicare Wellness Visit.      Chief Complaint  Annual Exam  Pelvic pain/ fracture  Insomnia  Hypothyroidism    Subjective        HPI  Girish Greenberg is also being seen today for pelvic pain/ fracture, insomnia,  hypothyroid, prostate cancer. Patient had cystoscopy w/ scraping. Unfortunately, he later had pain after surgery. After several weeks and follow ups at urology he then presented to ortho. Initially thought to have lumbar issues but then was found to have pelvic fx x 2 per MRI. He initially had great difficulty walking but can now ambulate short distances w/ assistance of crutches. He struggles w/ pain at night and unable to sleep. Tried hydrocodone v oxycodone w residual grogginess. Tramadol w/out benefit for pain. Has pain day and night but most intolerable in the evening related to associated sleep deprivation. He now gets limited and interrupted sleep. Previously tried trazodone and doxepin for sleep but not currently.   When on pain med was Taking mag v senna and able to have bm appropriately.   Cbc, cmp, lipid, tsh, b12, u/a reviewed w patient.              Objective   Vital Signs:  /84   Pulse 82   Ht 182.9 cm (72\")   Wt 90.7 kg (200 lb)   BMI 27.12 kg/m²     Physical Exam  Vitals and nursing note reviewed.   Constitutional:       Appearance: Normal appearance. He is well-developed.   HENT:      Head: Normocephalic and atraumatic.      Right Ear: Tympanic membrane and external ear normal.      Left Ear: Tympanic membrane and external ear normal.      Nose: Nose normal.      Mouth/Throat:      Mouth: Mucous membranes are moist.   Eyes:      Extraocular Movements: Extraocular movements intact.      Pupils: Pupils are equal, round, and reactive to light.   Cardiovascular:      Rate and Rhythm: Normal rate and regular rhythm.      " Pulses: Normal pulses.      Heart sounds: Normal heart sounds.   Pulmonary:      Effort: Pulmonary effort is normal. No respiratory distress.      Breath sounds: Normal breath sounds.   Abdominal:      General: Abdomen is flat.      Palpations: Abdomen is soft.   Musculoskeletal:         General: Normal range of motion.      Cervical back: Normal range of motion and neck supple.   Skin:     General: Skin is warm and dry.   Neurological:      General: No focal deficit present.      Mental Status: He is alert and oriented to person, place, and time.   Psychiatric:         Mood and Affect: Mood normal.         Behavior: Behavior normal.         Thought Content: Thought content normal.         Judgment: Judgment normal.          The following data was reviewed by: Regina Landis MD on 12/20/2022:  CMP    CMP 5/19/22 6/1/22 12/14/22   Glucose  94 99   BUN  25 22   Creatinine 0.80 0.99 0.94   Sodium  139 137   Potassium  4.5 4.5   Chloride  106 104   Calcium  9.0 9.1   Total Protein  5.8 (A)    Albumin  3.7 (A) 4.00   Globulin  2.1    Total Bilirubin  0.2 0.2   Alkaline Phosphatase  37 (A) 47   AST (SGOT)  16 12   ALT (SGPT)  15 12   (A) Abnormal value       Comments are available for some flowsheets but are not being displayed.           CBC    CBC 2/11/22 3/16/22 12/14/22   WBC 5.24 5.71 7.18   RBC 4.73 4.64 4.17   Hemoglobin 14.4 13.8 11.8 (A)   Hematocrit 42.4 41.7 36.2 (A)   MCV 89.6 89.9 86.8   MCH 30.4 29.7 28.3   MCHC 34.0 33.1 32.6   RDW 13.1 12.7 12.0 (A)   Platelets 266 284 456 (A)   (A) Abnormal value            Lipid Panel    Lipid Panel 6/1/22 12/14/22   Total Cholesterol  169   Total Cholesterol 195    Triglycerides 159 (A) 73   HDL Cholesterol 67 64 (A)   VLDL Cholesterol 27 14   LDL Cholesterol  101 (A) 91   LDL/HDL Ratio 1.5 1.41   (A) Abnormal value       Comments are available for some flowsheets but are not being displayed.           TSH    TSH 6/1/22 12/14/22   TSH 1.440 1.920           UA     Urinalysis 12/14/22 12/14/22 12/14/22 12/14/22    1308 1308 1308 1308   Squamous Epithelial Cells, UA  0-2     Specific Gravity, UA 1.023      Ketones, UA Trace (A)      Blood, UA Trace (A)  Trace (A)    Leukocytes, UA Moderate (2+) (A)  2+ (A)    Nitrite, UA Positive (A)  Positive (A)    RBC, UA  0-2  0-2   WBC, UA  Too Numerous to Count (A)     Bacteria, UA  3+ (A)  Many (A)   (A) Abnormal value              reviewed urology report         Assessment and Plan   Diagnoses and all orders for this visit:    1. Healthcare maintenance (Primary)    2. Closed nondisplaced fracture of pelvis with routine healing, unspecified part of pelvis, subsequent encounter  -     Ambulatory Referral to Physical Therapy Evaluate and treat    3. Hypothyroidism, unspecified type    4. Insomnia, unspecified type    5. Anemia, unspecified type  -     CBC & Differential  -     Iron Profile  -     Folate  -     Vitamin B12  -     Basic Metabolic Panel    Other orders  -     oxyCODONE-acetaminophen (Percocet) 5-325 MG per tablet; Take 1 tablet by mouth Every 6 (Six) Hours As Needed for Moderate Pain.  Dispense: 30 tablet; Refill: 0         patient w/ recent pelvic fracture. Will request MRI testing. Will start percocet 5 mg given 7.5 was too strong. Once off oxycodone may try trazodone for sleep. To take meds to avoid constipation. Will refer to physical therapy. He will continue crutches for ambulation until less pain and improved strenght.    Continue thyroid replacement treatment. Will monitor this. Discussed all lab results. He has new anemia. Likely related to above. Will repeat lab testing in 1 month. Will follow up in 6 months or prn.          I spent 50 minutes caring for Girish on this date of service. This time includes time spent by me in the following activities:preparing for the visit, reviewing tests, obtaining and/or reviewing a separately obtained history, performing a medically appropriate examination and/or evaluation ,  counseling and educating the patient/family/caregiver, ordering medications, tests, or procedures, referring and communicating with other health care professionals , documenting information in the medical record and independently interpreting results and communicating that information with the patient/family/caregiver  Follow Up   Return in about 6 months (around 6/20/2023) for Recheck.  Patient was given instructions and counseling regarding his condition or for health maintenance advice. Please see specific information pulled into the AVS if appropriate.

## 2022-12-22 RX ORDER — LEVOTHYROXINE SODIUM 0.05 MG/1
TABLET ORAL
Qty: 30 TABLET | Refills: 17 | Status: SHIPPED | OUTPATIENT
Start: 2022-12-22

## 2023-01-04 ENCOUNTER — HOSPITAL ENCOUNTER (OUTPATIENT)
Dept: PHYSICAL THERAPY | Facility: HOSPITAL | Age: 68
Setting detail: THERAPIES SERIES
Discharge: HOME | End: 2023-01-04
Payer: COMMERCIAL

## 2023-01-04 DIAGNOSIS — S32.9XXA CLOSED NONDISPLACED FRACTURE OF PELVIS, UNSPECIFIED PART OF PELVIS, INITIAL ENCOUNTER: Primary | ICD-10-CM

## 2023-01-04 PROCEDURE — 97161 PT EVAL LOW COMPLEX 20 MIN: CPT
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
## 2023-01-10 ENCOUNTER — HOSPITAL ENCOUNTER (OUTPATIENT)
Dept: PHYSICAL THERAPY | Facility: HOSPITAL | Age: 68
Setting detail: THERAPIES SERIES
Discharge: HOME | End: 2023-01-10
Payer: COMMERCIAL

## 2023-01-10 DIAGNOSIS — S32.9XXA CLOSED NONDISPLACED FRACTURE OF PELVIS, UNSPECIFIED PART OF PELVIS, INITIAL ENCOUNTER: Primary | ICD-10-CM

## 2023-01-10 PROCEDURE — 97110 THERAPEUTIC EXERCISES: CPT
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
## 2023-01-13 RX ORDER — DULOXETIN HYDROCHLORIDE 60 MG/1
CAPSULE, DELAYED RELEASE ORAL
Qty: 90 CAPSULE | Refills: 1 | Status: SHIPPED | OUTPATIENT
Start: 2023-01-13

## 2023-01-19 ENCOUNTER — APPOINTMENT (OUTPATIENT)
Dept: PHYSICAL THERAPY | Facility: HOSPITAL | Age: 68
End: 2023-01-19
Payer: COMMERCIAL

## 2023-03-27 ENCOUNTER — HOSPITAL ENCOUNTER (OUTPATIENT)
Dept: CT IMAGING | Facility: HOSPITAL | Age: 68
Discharge: HOME | End: 2023-03-27
Admitting: INTERNAL MEDICINE
Payer: COMMERCIAL

## 2023-03-27 DIAGNOSIS — R91.1 PULMONARY NODULE: ICD-10-CM

## 2023-03-27 PROCEDURE — 71250 CT THORAX DX C-: CPT

## 2023-04-28 ENCOUNTER — TELEPHONE (OUTPATIENT)
Dept: INTERNAL MEDICINE | Facility: CLINIC | Age: 68
End: 2023-04-28
Payer: MEDICARE

## 2023-04-28 NOTE — TELEPHONE ENCOUNTER
Called patient and spoke with him.   He had Colonoscopy 02/02/2022 with Dr Orellana.  Per his note, follow up Colonoscopy in 2 years.  So 02/2024.    He understood.

## 2023-04-28 NOTE — TELEPHONE ENCOUNTER
Caller: Girish Greenberg    Relationship: Self    Best call back number: [phone]    Who are you requesting to speak with (clinical staff, provider,  specific staff member): JOSETTE    What was the call regarding: PATIENT HAD A COLONOSCOPY 6 MONTHS AGO AND WAS TOLD TO HAVE ANOTHER COLONSCOPY 6 MONTHS LATER. PATIENT WOULD LIKE TO SPEAK TO JOSETTE REGARDING THIS.    Do you require a callback: YES

## 2023-05-02 ENCOUNTER — TRANSCRIBE ORDERS (OUTPATIENT)
Dept: SLEEP MEDICINE | Facility: HOSPITAL | Age: 68
End: 2023-05-02
Payer: MEDICARE

## 2023-05-02 DIAGNOSIS — R91.8 PULMONARY NODULES: Primary | ICD-10-CM

## 2023-06-05 ENCOUNTER — APPOINTMENT (OUTPATIENT)
Dept: CT IMAGING | Facility: HOSPITAL | Age: 68
End: 2023-06-05
Payer: COMMERCIAL

## 2023-06-05 ENCOUNTER — APPOINTMENT (OUTPATIENT)
Dept: MRI IMAGING | Facility: HOSPITAL | Age: 68
End: 2023-06-05
Payer: COMMERCIAL

## 2023-06-05 ENCOUNTER — HOSPITAL ENCOUNTER (INPATIENT)
Facility: HOSPITAL | Age: 68
LOS: 2 days | Discharge: HOME HEALTH | End: 2023-06-08
Attending: EMERGENCY MEDICINE | Admitting: INTERNAL MEDICINE
Payer: COMMERCIAL

## 2023-06-05 DIAGNOSIS — R73.9 HYPERGLYCEMIA: ICD-10-CM

## 2023-06-05 DIAGNOSIS — R10.2 PELVIC PAIN: ICD-10-CM

## 2023-06-05 DIAGNOSIS — M86.00 ACUTE HEMATOGENOUS OSTEOMYELITIS, UNSPECIFIED SITE: ICD-10-CM

## 2023-06-05 DIAGNOSIS — Z87.81 HISTORY OF PELVIC FRACTURE: ICD-10-CM

## 2023-06-05 DIAGNOSIS — R93.5 ABNORMAL CT OF THE ABDOMEN: Primary | ICD-10-CM

## 2023-06-05 PROBLEM — E03.9 HYPOTHYROIDISM (ACQUIRED): Status: ACTIVE | Noted: 2023-06-05

## 2023-06-05 PROBLEM — R10.9 ABDOMINAL PAIN: Status: ACTIVE | Noted: 2023-06-05

## 2023-06-05 PROBLEM — Z85.46 HISTORY OF PROSTATE CANCER: Status: ACTIVE | Noted: 2023-06-05

## 2023-06-05 LAB
ALBUMIN SERPL-MCNC: 4 G/DL (ref 3.5–5.2)
ALBUMIN/GLOB SERPL: 1.2 G/DL
ALP SERPL-CCNC: 39 U/L (ref 39–117)
ALT SERPL W P-5'-P-CCNC: 18 U/L (ref 1–41)
ANION GAP SERPL CALCULATED.3IONS-SCNC: 9.9 MMOL/L (ref 5–15)
AST SERPL-CCNC: 18 U/L (ref 1–40)
B PARAPERT DNA SPEC QL NAA+PROBE: NOT DETECTED
B PERT DNA SPEC QL NAA+PROBE: NOT DETECTED
BACTERIA UR QL AUTO: ABNORMAL /HPF
BASOPHILS # BLD AUTO: 0.02 10*3/MM3 (ref 0–0.2)
BASOPHILS NFR BLD AUTO: 0.2 % (ref 0–1.5)
BILIRUB SERPL-MCNC: 0.3 MG/DL (ref 0–1.2)
BILIRUB UR QL STRIP: NEGATIVE
BUN SERPL-MCNC: 33 MG/DL (ref 8–23)
BUN/CREAT SERPL: 36.3 (ref 7–25)
C PNEUM DNA NPH QL NAA+NON-PROBE: NOT DETECTED
CALCIUM SPEC-SCNC: 10.5 MG/DL (ref 8.6–10.5)
CHLORIDE SERPL-SCNC: 103 MMOL/L (ref 98–107)
CLARITY UR: ABNORMAL
CO2 SERPL-SCNC: 25.1 MMOL/L (ref 22–29)
COLOR UR: YELLOW
CREAT SERPL-MCNC: 0.91 MG/DL (ref 0.76–1.27)
CRP SERPL-MCNC: 2.29 MG/DL (ref 0–0.5)
D-LACTATE SERPL-SCNC: 1.1 MMOL/L (ref 0.5–2)
D-LACTATE SERPL-SCNC: 1.2 MMOL/L (ref 0.5–2)
DEPRECATED RDW RBC AUTO: 40.9 FL (ref 37–54)
EGFRCR SERPLBLD CKD-EPI 2021: 92.4 ML/MIN/1.73
EOSINOPHIL # BLD AUTO: 0.05 10*3/MM3 (ref 0–0.4)
EOSINOPHIL NFR BLD AUTO: 0.6 % (ref 0.3–6.2)
ERYTHROCYTE [DISTWIDTH] IN BLOOD BY AUTOMATED COUNT: 13 % (ref 12.3–15.4)
ERYTHROCYTE [SEDIMENTATION RATE] IN BLOOD: 28 MM/HR (ref 0–20)
FLUAV SUBTYP SPEC NAA+PROBE: NOT DETECTED
FLUBV RNA ISLT QL NAA+PROBE: NOT DETECTED
GLOBULIN UR ELPH-MCNC: 3.3 GM/DL
GLUCOSE SERPL-MCNC: 124 MG/DL (ref 65–99)
GLUCOSE UR STRIP-MCNC: NEGATIVE MG/DL
HADV DNA SPEC NAA+PROBE: NOT DETECTED
HCOV 229E RNA SPEC QL NAA+PROBE: NOT DETECTED
HCOV HKU1 RNA SPEC QL NAA+PROBE: NOT DETECTED
HCOV NL63 RNA SPEC QL NAA+PROBE: NOT DETECTED
HCOV OC43 RNA SPEC QL NAA+PROBE: NOT DETECTED
HCT VFR BLD AUTO: 39.4 % (ref 37.5–51)
HGB BLD-MCNC: 13.2 G/DL (ref 13–17.7)
HGB UR QL STRIP.AUTO: ABNORMAL
HMPV RNA NPH QL NAA+NON-PROBE: NOT DETECTED
HOLD SPECIMEN: NORMAL
HOLD SPECIMEN: NORMAL
HPIV1 RNA ISLT QL NAA+PROBE: NOT DETECTED
HPIV2 RNA SPEC QL NAA+PROBE: NOT DETECTED
HPIV3 RNA NPH QL NAA+PROBE: NOT DETECTED
HPIV4 P GENE NPH QL NAA+PROBE: NOT DETECTED
HYALINE CASTS UR QL AUTO: ABNORMAL /LPF
IMM GRANULOCYTES # BLD AUTO: 0.03 10*3/MM3 (ref 0–0.05)
IMM GRANULOCYTES NFR BLD AUTO: 0.3 % (ref 0–0.5)
KETONES UR QL STRIP: NEGATIVE
LEUKOCYTE ESTERASE UR QL STRIP.AUTO: ABNORMAL
LIPASE SERPL-CCNC: 31 U/L (ref 13–60)
LYMPHOCYTES # BLD AUTO: 1.03 10*3/MM3 (ref 0.7–3.1)
LYMPHOCYTES NFR BLD AUTO: 11.8 % (ref 19.6–45.3)
M PNEUMO IGG SER IA-ACNC: NOT DETECTED
MCH RBC QN AUTO: 28.8 PG (ref 26.6–33)
MCHC RBC AUTO-ENTMCNC: 33.5 G/DL (ref 31.5–35.7)
MCV RBC AUTO: 86 FL (ref 79–97)
MONOCYTES # BLD AUTO: 0.5 10*3/MM3 (ref 0.1–0.9)
MONOCYTES NFR BLD AUTO: 5.7 % (ref 5–12)
NEUTROPHILS NFR BLD AUTO: 7.07 10*3/MM3 (ref 1.7–7)
NEUTROPHILS NFR BLD AUTO: 81.4 % (ref 42.7–76)
NITRITE UR QL STRIP: NEGATIVE
NRBC BLD AUTO-RTO: 0.1 /100 WBC (ref 0–0.2)
PH UR STRIP.AUTO: 6 [PH] (ref 5–8)
PLATELET # BLD AUTO: 359 10*3/MM3 (ref 140–450)
PMV BLD AUTO: 8.6 FL (ref 6–12)
POTASSIUM SERPL-SCNC: 4.8 MMOL/L (ref 3.5–5.2)
PROT SERPL-MCNC: 7.3 G/DL (ref 6–8.5)
PROT UR QL STRIP: ABNORMAL
RBC # BLD AUTO: 4.58 10*6/MM3 (ref 4.14–5.8)
RBC # UR STRIP: ABNORMAL /HPF
REF LAB TEST METHOD: ABNORMAL
RHINOVIRUS RNA SPEC NAA+PROBE: NOT DETECTED
RSV RNA NPH QL NAA+NON-PROBE: NOT DETECTED
SARS-COV-2 RNA NPH QL NAA+NON-PROBE: NOT DETECTED
SODIUM SERPL-SCNC: 138 MMOL/L (ref 136–145)
SP GR UR STRIP: >=1.03 (ref 1–1.03)
SQUAMOUS #/AREA URNS HPF: ABNORMAL /HPF
UROBILINOGEN UR QL STRIP: ABNORMAL
WBC # UR STRIP: ABNORMAL /HPF
WBC NRBC COR # BLD: 8.7 10*3/MM3 (ref 3.4–10.8)
WHOLE BLOOD HOLD COAG: NORMAL
WHOLE BLOOD HOLD SPECIMEN: NORMAL

## 2023-06-05 PROCEDURE — 87077 CULTURE AEROBIC IDENTIFY: CPT | Performed by: INTERNAL MEDICINE

## 2023-06-05 PROCEDURE — 83605 ASSAY OF LACTIC ACID: CPT | Performed by: INTERNAL MEDICINE

## 2023-06-05 PROCEDURE — 80053 COMPREHEN METABOLIC PANEL: CPT

## 2023-06-05 PROCEDURE — 87086 URINE CULTURE/COLONY COUNT: CPT | Performed by: INTERNAL MEDICINE

## 2023-06-05 PROCEDURE — 81001 URINALYSIS AUTO W/SCOPE: CPT | Performed by: EMERGENCY MEDICINE

## 2023-06-05 PROCEDURE — 0 GADOBENATE DIMEGLUMINE 529 MG/ML SOLUTION: Performed by: INTERNAL MEDICINE

## 2023-06-05 PROCEDURE — 99284 EMERGENCY DEPT VISIT MOD MDM: CPT

## 2023-06-05 PROCEDURE — 72197 MRI PELVIS W/O & W/DYE: CPT

## 2023-06-05 PROCEDURE — 25010000002 ONDANSETRON PER 1 MG: Performed by: EMERGENCY MEDICINE

## 2023-06-05 PROCEDURE — G0378 HOSPITAL OBSERVATION PER HR: HCPCS

## 2023-06-05 PROCEDURE — 25010000002 MORPHINE PER 10 MG: Performed by: NURSE PRACTITIONER

## 2023-06-05 PROCEDURE — 87186 SC STD MICRODIL/AGAR DIL: CPT | Performed by: INTERNAL MEDICINE

## 2023-06-05 PROCEDURE — 94640 AIRWAY INHALATION TREATMENT: CPT

## 2023-06-05 PROCEDURE — 74177 CT ABD & PELVIS W/CONTRAST: CPT

## 2023-06-05 PROCEDURE — 25010000002 MORPHINE PER 10 MG: Performed by: EMERGENCY MEDICINE

## 2023-06-05 PROCEDURE — 85652 RBC SED RATE AUTOMATED: CPT | Performed by: EMERGENCY MEDICINE

## 2023-06-05 PROCEDURE — A9577 INJ MULTIHANCE: HCPCS | Performed by: INTERNAL MEDICINE

## 2023-06-05 PROCEDURE — 83690 ASSAY OF LIPASE: CPT

## 2023-06-05 PROCEDURE — 25010000002 VANCOMYCIN 10 G RECONSTITUTED SOLUTION: Performed by: INTERNAL MEDICINE

## 2023-06-05 PROCEDURE — 25010000002 CEFTRIAXONE PER 250 MG: Performed by: INTERNAL MEDICINE

## 2023-06-05 PROCEDURE — 0202U NFCT DS 22 TRGT SARS-COV-2: CPT | Performed by: INTERNAL MEDICINE

## 2023-06-05 PROCEDURE — 87040 BLOOD CULTURE FOR BACTERIA: CPT | Performed by: EMERGENCY MEDICINE

## 2023-06-05 PROCEDURE — 25010000002 MORPHINE PER 10 MG: Performed by: INTERNAL MEDICINE

## 2023-06-05 PROCEDURE — 87040 BLOOD CULTURE FOR BACTERIA: CPT | Performed by: INTERNAL MEDICINE

## 2023-06-05 PROCEDURE — 86140 C-REACTIVE PROTEIN: CPT | Performed by: EMERGENCY MEDICINE

## 2023-06-05 PROCEDURE — 36415 COLL VENOUS BLD VENIPUNCTURE: CPT

## 2023-06-05 PROCEDURE — 83605 ASSAY OF LACTIC ACID: CPT

## 2023-06-05 PROCEDURE — 25510000001 IOPAMIDOL 61 % SOLUTION: Performed by: EMERGENCY MEDICINE

## 2023-06-05 PROCEDURE — 85025 COMPLETE CBC W/AUTO DIFF WBC: CPT

## 2023-06-05 RX ORDER — MORPHINE SULFATE 2 MG/ML
4 INJECTION, SOLUTION INTRAMUSCULAR; INTRAVENOUS ONCE
Status: COMPLETED | OUTPATIENT
Start: 2023-06-05 | End: 2023-06-05

## 2023-06-05 RX ORDER — ALBUTEROL SULFATE 2.5 MG/3ML
2.5 SOLUTION RESPIRATORY (INHALATION) EVERY 6 HOURS PRN
Status: DISCONTINUED | OUTPATIENT
Start: 2023-06-05 | End: 2023-06-08 | Stop reason: HOSPADM

## 2023-06-05 RX ORDER — SODIUM CHLORIDE 9 MG/ML
75 INJECTION, SOLUTION INTRAVENOUS CONTINUOUS
Status: DISCONTINUED | OUTPATIENT
Start: 2023-06-05 | End: 2023-06-08 | Stop reason: HOSPADM

## 2023-06-05 RX ORDER — BISACODYL 10 MG
10 SUPPOSITORY, RECTAL RECTAL DAILY PRN
Status: DISCONTINUED | OUTPATIENT
Start: 2023-06-05 | End: 2023-06-07

## 2023-06-05 RX ORDER — NALOXONE HCL 0.4 MG/ML
0.4 VIAL (ML) INJECTION
Status: DISCONTINUED | OUTPATIENT
Start: 2023-06-05 | End: 2023-06-05

## 2023-06-05 RX ORDER — CYCLOBENZAPRINE HCL 10 MG
5 TABLET ORAL 3 TIMES DAILY PRN
Status: DISCONTINUED | OUTPATIENT
Start: 2023-06-05 | End: 2023-06-08 | Stop reason: HOSPADM

## 2023-06-05 RX ORDER — OXYCODONE HYDROCHLORIDE AND ACETAMINOPHEN 5; 325 MG/1; MG/1
1 TABLET ORAL EVERY 4 HOURS PRN
Status: DISCONTINUED | OUTPATIENT
Start: 2023-06-05 | End: 2023-06-08 | Stop reason: HOSPADM

## 2023-06-05 RX ORDER — BUDESONIDE AND FORMOTEROL FUMARATE DIHYDRATE 160; 4.5 UG/1; UG/1
2 AEROSOL RESPIRATORY (INHALATION)
Status: DISCONTINUED | OUTPATIENT
Start: 2023-06-05 | End: 2023-06-08 | Stop reason: HOSPADM

## 2023-06-05 RX ORDER — LEVOTHYROXINE SODIUM 0.05 MG/1
50 TABLET ORAL DAILY
Status: DISCONTINUED | OUTPATIENT
Start: 2023-06-05 | End: 2023-06-08 | Stop reason: HOSPADM

## 2023-06-05 RX ORDER — NALOXONE HCL 0.4 MG/ML
0.4 VIAL (ML) INJECTION
Status: DISCONTINUED | OUTPATIENT
Start: 2023-06-05 | End: 2023-06-08 | Stop reason: HOSPADM

## 2023-06-05 RX ORDER — ONDANSETRON 4 MG/1
4 TABLET, FILM COATED ORAL EVERY 6 HOURS PRN
Status: DISCONTINUED | OUTPATIENT
Start: 2023-06-05 | End: 2023-06-08 | Stop reason: HOSPADM

## 2023-06-05 RX ORDER — SODIUM CHLORIDE 0.9 % (FLUSH) 0.9 %
10 SYRINGE (ML) INJECTION EVERY 12 HOURS SCHEDULED
Status: DISCONTINUED | OUTPATIENT
Start: 2023-06-05 | End: 2023-06-08 | Stop reason: HOSPADM

## 2023-06-05 RX ORDER — ACETAMINOPHEN 160 MG/5ML
650 SOLUTION ORAL EVERY 4 HOURS PRN
Status: DISCONTINUED | OUTPATIENT
Start: 2023-06-05 | End: 2023-06-08 | Stop reason: HOSPADM

## 2023-06-05 RX ORDER — ACETAMINOPHEN 650 MG/1
650 SUPPOSITORY RECTAL EVERY 4 HOURS PRN
Status: DISCONTINUED | OUTPATIENT
Start: 2023-06-05 | End: 2023-06-08 | Stop reason: HOSPADM

## 2023-06-05 RX ORDER — MORPHINE SULFATE 2 MG/ML
2 INJECTION, SOLUTION INTRAMUSCULAR; INTRAVENOUS
Status: DISCONTINUED | OUTPATIENT
Start: 2023-06-05 | End: 2023-06-05

## 2023-06-05 RX ORDER — ONDANSETRON 2 MG/ML
4 INJECTION INTRAMUSCULAR; INTRAVENOUS ONCE AS NEEDED
Status: COMPLETED | OUTPATIENT
Start: 2023-06-05 | End: 2023-06-05

## 2023-06-05 RX ORDER — VANCOMYCIN HYDROCHLORIDE 1 G/200ML
1000 INJECTION, SOLUTION INTRAVENOUS EVERY 12 HOURS
Status: DISCONTINUED | OUTPATIENT
Start: 2023-06-06 | End: 2023-06-07

## 2023-06-05 RX ORDER — POLYETHYLENE GLYCOL 3350 17 G/17G
17 POWDER, FOR SOLUTION ORAL DAILY PRN
Status: DISCONTINUED | OUTPATIENT
Start: 2023-06-05 | End: 2023-06-07

## 2023-06-05 RX ORDER — AMOXICILLIN 250 MG
2 CAPSULE ORAL 2 TIMES DAILY
Status: DISCONTINUED | OUTPATIENT
Start: 2023-06-05 | End: 2023-06-07

## 2023-06-05 RX ORDER — SODIUM CHLORIDE 0.9 % (FLUSH) 0.9 %
10 SYRINGE (ML) INJECTION AS NEEDED
Status: DISCONTINUED | OUTPATIENT
Start: 2023-06-05 | End: 2023-06-08 | Stop reason: HOSPADM

## 2023-06-05 RX ORDER — ACETAMINOPHEN 325 MG/1
650 TABLET ORAL EVERY 4 HOURS PRN
Status: DISCONTINUED | OUTPATIENT
Start: 2023-06-05 | End: 2023-06-08 | Stop reason: HOSPADM

## 2023-06-05 RX ORDER — ONDANSETRON 2 MG/ML
4 INJECTION INTRAMUSCULAR; INTRAVENOUS EVERY 6 HOURS PRN
Status: DISCONTINUED | OUTPATIENT
Start: 2023-06-05 | End: 2023-06-08 | Stop reason: HOSPADM

## 2023-06-05 RX ORDER — MORPHINE SULFATE 2 MG/ML
2 INJECTION, SOLUTION INTRAMUSCULAR; INTRAVENOUS EVERY 4 HOURS PRN
Status: DISCONTINUED | OUTPATIENT
Start: 2023-06-05 | End: 2023-06-07

## 2023-06-05 RX ORDER — BISACODYL 5 MG/1
5 TABLET, DELAYED RELEASE ORAL DAILY PRN
Status: DISCONTINUED | OUTPATIENT
Start: 2023-06-05 | End: 2023-06-07

## 2023-06-05 RX ORDER — SODIUM CHLORIDE 9 MG/ML
40 INJECTION, SOLUTION INTRAVENOUS AS NEEDED
Status: DISCONTINUED | OUTPATIENT
Start: 2023-06-05 | End: 2023-06-08 | Stop reason: HOSPADM

## 2023-06-05 RX ADMIN — ONDANSETRON 4 MG: 2 INJECTION INTRAMUSCULAR; INTRAVENOUS at 04:42

## 2023-06-05 RX ADMIN — MORPHINE SULFATE 2 MG: 2 INJECTION, SOLUTION INTRAMUSCULAR; INTRAVENOUS at 11:13

## 2023-06-05 RX ADMIN — LEVOTHYROXINE SODIUM 50 MCG: 0.05 TABLET ORAL at 15:55

## 2023-06-05 RX ADMIN — Medication 10 ML: at 11:13

## 2023-06-05 RX ADMIN — SODIUM CHLORIDE 75 ML/HR: 9 INJECTION, SOLUTION INTRAVENOUS at 15:57

## 2023-06-05 RX ADMIN — VANCOMYCIN HYDROCHLORIDE 1750 MG: 10 INJECTION, POWDER, LYOPHILIZED, FOR SOLUTION INTRAVENOUS at 18:18

## 2023-06-05 RX ADMIN — CEFTRIAXONE 2 G: 2 INJECTION, POWDER, FOR SOLUTION INTRAMUSCULAR; INTRAVENOUS at 15:57

## 2023-06-05 RX ADMIN — MORPHINE SULFATE 4 MG: 2 INJECTION, SOLUTION INTRAMUSCULAR; INTRAVENOUS at 04:42

## 2023-06-05 RX ADMIN — Medication 10 ML: at 20:00

## 2023-06-05 RX ADMIN — SODIUM CHLORIDE 500 ML: 9 INJECTION, SOLUTION INTRAVENOUS at 04:41

## 2023-06-05 RX ADMIN — GADOBENATE DIMEGLUMINE 19 ML: 529 INJECTION, SOLUTION INTRAVENOUS at 14:09

## 2023-06-05 RX ADMIN — DOCUSATE SODIUM 50MG AND SENNOSIDES 8.6MG 2 TABLET: 8.6; 5 TABLET, FILM COATED ORAL at 20:00

## 2023-06-05 RX ADMIN — BUDESONIDE AND FORMOTEROL FUMARATE DIHYDRATE 2 PUFF: 160; 4.5 AEROSOL RESPIRATORY (INHALATION) at 21:02

## 2023-06-05 RX ADMIN — DOCUSATE SODIUM 50MG AND SENNOSIDES 8.6MG 2 TABLET: 8.6; 5 TABLET, FILM COATED ORAL at 11:14

## 2023-06-05 RX ADMIN — IOPAMIDOL 85 ML: 612 INJECTION, SOLUTION INTRAVENOUS at 04:21

## 2023-06-05 RX ADMIN — MORPHINE SULFATE 2 MG: 2 INJECTION, SOLUTION INTRAMUSCULAR; INTRAVENOUS at 15:55

## 2023-06-05 RX ADMIN — MORPHINE SULFATE 2 MG: 2 INJECTION, SOLUTION INTRAMUSCULAR; INTRAVENOUS at 22:25

## 2023-06-05 RX ADMIN — CYCLOBENZAPRINE 5 MG: 10 TABLET, FILM COATED ORAL at 15:55
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
## 2023-06-05 NOTE — ED TRIAGE NOTES
Pt to ED via PV accompanied by spouse. Pt reports RLQ pain that has been ongoing for 3 months. The pt reports that his chronic pain worsened over the last 2 days to the point it has affected the patient's ability to walk. Pt denies any pain radiation, any injuries or any changes in bowel or bladder habits.     This RN in appropriate PPE for all patient care interactions. Pt masked and redirected for proper mask use when necessary. Hand hygiene performed before and after all patient care interactions.
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
## 2023-06-05 NOTE — PLAN OF CARE
Goal Outcome Evaluation:  Plan of Care Reviewed With: patient        Progress: improving     Pt AxOx4, calm and cooperative. VSS. NPO most of the day. Covid test neg this AM. MRI pelvis done this afternoon. Per Dr. Hernandez ok for Reg diet for dinner, NPO after MD. Pt takes meds whole with water, see MAR. IV fluids at 75 ml/hr. IV Rocephin given. PRN morphine given x2 for pain control, with good effect. Urinal at bedside. Significant other at bedside, attentive to pt. RN will continue to monitor.

## 2023-06-05 NOTE — ED NOTES
Nursing report ED to floor  Girish Greenberg  67 y.o.  male    HPI :   Chief Complaint   Patient presents with    Abdominal Pain       Admitting doctor:   Garry Burch MD    Admitting diagnosis:   The primary encounter diagnosis was Abnormal CT of the abdomen. Diagnoses of Pelvic pain, Hyperglycemia, and History of pelvic fracture were also pertinent to this visit.    Code status:   Current Code Status       Date Active Code Status Order ID Comments User Context       Prior            Allergies:   Penicillins    Isolation:   Enhanced Droplet/Contact     Intake and Output  No intake or output data in the 24 hours ending 06/05/23 0630    Weight:       06/05/23  0239   Weight: 90.7 kg (200 lb)       Most recent vitals:   Vitals:    06/05/23 0241 06/05/23 0404 06/05/23 0433 06/05/23 0546   BP: 116/88 131/93  122/86   BP Location: Right arm      Patient Position: Sitting      Pulse:   69 61   Resp:       Temp:       TempSrc:       SpO2:   94% (!) 89%   Weight:       Height:           Active LDAs/IV Access:   Lines, Drains & Airways       Active LDAs       Name Placement date Placement time Site Days    Peripheral IV 06/05/23 0320 Right Antecubital 06/05/23  0320  Antecubital  less than 1                    Labs (abnormal labs have a star):   Labs Reviewed   COMPREHENSIVE METABOLIC PANEL - Abnormal; Notable for the following components:       Result Value    Glucose 124 (*)     BUN 33 (*)     BUN/Creatinine Ratio 36.3 (*)     All other components within normal limits    Narrative:     GFR Normal >60  Chronic Kidney Disease <60  Kidney Failure <15     CBC WITH AUTO DIFFERENTIAL - Abnormal; Notable for the following components:    Neutrophil % 81.4 (*)     Lymphocyte % 11.8 (*)     Neutrophils, Absolute 7.07 (*)     All other components within normal limits   C-REACTIVE PROTEIN - Abnormal; Notable for the following components:    C-Reactive Protein 2.29 (*)     All other components within normal limits   LIPASE - Normal    LACTIC ACID, PLASMA - Normal   BLOOD CULTURE   BLOOD CULTURE   RAINBOW DRAW    Narrative:     The following orders were created for panel order [place] Draw.  Procedure                               Abnormality         Status                     ---------                               -----------         ------                     Green Top (Gel)[[id number]]                                  Final result               Lavender Top[[id number]]                                     Final result               Gold Top - SST[[id number]]                                   Final result               Light Blue Top[[id number]]                                   Final result                 Please view results for these tests on the individual orders.   SEDIMENTATION RATE   URINALYSIS W/ MICROSCOPIC IF INDICATED (NO CULTURE)   CBC AND DIFFERENTIAL    Narrative:     The following orders were created for panel order CBC & Differential.  Procedure                               Abnormality         Status                     ---------                               -----------         ------                     CBC Auto Differential[[id number]]        Abnormal            Final result                 Please view results for these tests on the individual orders.   GREEN TOP   LAVENDER TOP   GOLD TOP - SST   LIGHT BLUE TOP       EKG:   No orders to display       Meds given in ED:   Medications   sodium chloride 0.9 % flush 10 mL (has no administration in time range)   morphine injection 4 mg (4 mg Intravenous Given 6/5/23 0442)   ondansetron (ZOFRAN) injection 4 mg (4 mg Intravenous Given 6/5/23 0442)   sodium chloride 0.9 % bolus 500 mL (500 mL Intravenous New Bag 6/5/23 0441)   iopamidol (ISOVUE-300) 61 % injection 100 mL (85 mL Intravenous Given 6/5/23 0421)       Imaging results:  CT Abdomen Pelvis With Contrast    Result Date: 6/5/2023  Electronically signed by Cr Don M.D. on 06-05-23 at 0553     Ambulatory status:   - up ad  nav     Social issues:   Social History     Socioeconomic History    Marital status:    Tobacco Use    Smoking status: Never    Smokeless tobacco: Never   Vaping Use    Vaping Use: Never used   Substance and Sexual Activity    Alcohol use: Not Currently     Alcohol/week: 2.0 standard drinks     Types: 2 Cans of beer per week     Comment: 2 PER DAY    Drug use: No    Sexual activity: Defer     Partners: Male       NIH Stroke Scale:         Kayleen Everett RN  06/05/23 06:30 EDT

## 2023-06-05 NOTE — NURSING NOTE
RN paged and spoke with Dr. Hernandez with [facility] re: Uroloy consulted this AM. Per Dr. Hernandez will come see pt tomorrow morning. Ok to eat Reg dinner and keep NPO after Midnight.

## 2023-06-06 LAB
ANION GAP SERPL CALCULATED.3IONS-SCNC: 10 MMOL/L (ref 5–15)
BUN SERPL-MCNC: 14 MG/DL (ref 8–23)
BUN/CREAT SERPL: 20.3 (ref 7–25)
CALCIUM SPEC-SCNC: 9.4 MG/DL (ref 8.6–10.5)
CHLORIDE SERPL-SCNC: 104 MMOL/L (ref 98–107)
CO2 SERPL-SCNC: 25 MMOL/L (ref 22–29)
CREAT SERPL-MCNC: 0.69 MG/DL (ref 0.76–1.27)
DEPRECATED RDW RBC AUTO: 42.4 FL (ref 37–54)
EGFRCR SERPLBLD CKD-EPI 2021: 101.4 ML/MIN/1.73
ERYTHROCYTE [DISTWIDTH] IN BLOOD BY AUTOMATED COUNT: 13.5 % (ref 12.3–15.4)
GLUCOSE SERPL-MCNC: 111 MG/DL (ref 65–99)
HCT VFR BLD AUTO: 36.7 % (ref 37.5–51)
HGB BLD-MCNC: 12.3 G/DL (ref 13–17.7)
MCH RBC QN AUTO: 29.2 PG (ref 26.6–33)
MCHC RBC AUTO-ENTMCNC: 33.5 G/DL (ref 31.5–35.7)
MCV RBC AUTO: 87.2 FL (ref 79–97)
PLATELET # BLD AUTO: 330 10*3/MM3 (ref 140–450)
PMV BLD AUTO: 8.6 FL (ref 6–12)
POTASSIUM SERPL-SCNC: 3.9 MMOL/L (ref 3.5–5.2)
RBC # BLD AUTO: 4.21 10*6/MM3 (ref 4.14–5.8)
SODIUM SERPL-SCNC: 139 MMOL/L (ref 136–145)
WBC NRBC COR # BLD: 7.49 10*3/MM3 (ref 3.4–10.8)

## 2023-06-06 PROCEDURE — 25010000002 MORPHINE PER 10 MG: Performed by: INTERNAL MEDICINE

## 2023-06-06 PROCEDURE — 99223 1ST HOSP IP/OBS HIGH 75: CPT | Performed by: STUDENT IN AN ORGANIZED HEALTH CARE EDUCATION/TRAINING PROGRAM

## 2023-06-06 PROCEDURE — 94799 UNLISTED PULMONARY SVC/PX: CPT

## 2023-06-06 PROCEDURE — 94761 N-INVAS EAR/PLS OXIMETRY MLT: CPT

## 2023-06-06 PROCEDURE — 25010000002 VANCOMYCIN PER 500 MG: Performed by: INTERNAL MEDICINE

## 2023-06-06 PROCEDURE — 94664 DEMO&/EVAL PT USE INHALER: CPT

## 2023-06-06 PROCEDURE — 85027 COMPLETE CBC AUTOMATED: CPT | Performed by: NURSE PRACTITIONER

## 2023-06-06 PROCEDURE — 25010000002 CEFTRIAXONE PER 250 MG: Performed by: INTERNAL MEDICINE

## 2023-06-06 PROCEDURE — 0T9B70Z DRAINAGE OF BLADDER WITH DRAINAGE DEVICE, VIA NATURAL OR ARTIFICIAL OPENING: ICD-10-PCS | Performed by: SURGERY

## 2023-06-06 PROCEDURE — 80048 BASIC METABOLIC PNL TOTAL CA: CPT | Performed by: NURSE PRACTITIONER

## 2023-06-06 RX ORDER — DIPHENHYDRAMINE HYDROCHLORIDE, ZINC ACETATE 2; .1 G/100G; G/100G
1 CREAM TOPICAL 3 TIMES DAILY PRN
Status: DISCONTINUED | OUTPATIENT
Start: 2023-06-06 | End: 2023-06-08 | Stop reason: HOSPADM

## 2023-06-06 RX ADMIN — BUDESONIDE AND FORMOTEROL FUMARATE DIHYDRATE 2 PUFF: 160; 4.5 AEROSOL RESPIRATORY (INHALATION) at 07:20

## 2023-06-06 RX ADMIN — MORPHINE SULFATE 2 MG: 2 INJECTION, SOLUTION INTRAMUSCULAR; INTRAVENOUS at 02:03

## 2023-06-06 RX ADMIN — DOCUSATE SODIUM 50MG AND SENNOSIDES 8.6MG 2 TABLET: 8.6; 5 TABLET, FILM COATED ORAL at 20:26

## 2023-06-06 RX ADMIN — MORPHINE SULFATE 2 MG: 2 INJECTION, SOLUTION INTRAMUSCULAR; INTRAVENOUS at 10:24

## 2023-06-06 RX ADMIN — VANCOMYCIN HYDROCHLORIDE 1000 MG: 1 INJECTION, SOLUTION INTRAVENOUS at 17:29

## 2023-06-06 RX ADMIN — Medication 10 ML: at 09:11

## 2023-06-06 RX ADMIN — MORPHINE SULFATE 2 MG: 2 INJECTION, SOLUTION INTRAMUSCULAR; INTRAVENOUS at 16:12

## 2023-06-06 RX ADMIN — MORPHINE SULFATE 2 MG: 2 INJECTION, SOLUTION INTRAMUSCULAR; INTRAVENOUS at 23:18

## 2023-06-06 RX ADMIN — CEFTRIAXONE 2 G: 2 INJECTION, POWDER, FOR SOLUTION INTRAMUSCULAR; INTRAVENOUS at 16:12

## 2023-06-06 RX ADMIN — Medication 10 ML: at 20:26

## 2023-06-06 RX ADMIN — VANCOMYCIN HYDROCHLORIDE 1000 MG: 1 INJECTION, SOLUTION INTRAVENOUS at 04:10

## 2023-06-06 RX ADMIN — OXYCODONE AND ACETAMINOPHEN 1 TABLET: 5; 325 TABLET ORAL at 17:38

## 2023-06-06 RX ADMIN — POLYETHYLENE GLYCOL 3350 17 G: 17 POWDER, FOR SOLUTION ORAL at 18:20

## 2023-06-06 RX ADMIN — SODIUM CHLORIDE 75 ML/HR: 9 INJECTION, SOLUTION INTRAVENOUS at 04:09

## 2023-06-06 RX ADMIN — BUDESONIDE AND FORMOTEROL FUMARATE DIHYDRATE 2 PUFF: 160; 4.5 AEROSOL RESPIRATORY (INHALATION) at 21:27
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
## 2023-06-06 NOTE — PROGRESS NOTES
Complicated fuentes catheter placement      Placement of 14F coude catheter over a sensor wire with return of clear yellow urine, done in the usual standard fashion.

## 2023-06-06 NOTE — PLAN OF CARE
Goal Outcome Evaluation: Pt resting in bed with no signs of distress noted at this time. VS stable. Wife is at bedside. 14fr coude fuentes was placed by Urology MD. Pt tolerated well. Pt will follow-up as outpatient for fuentes removal. Bed in lowest position with siderails up X2. Call light within reach. RN will continue to monitor.
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
## 2023-06-06 NOTE — NURSING NOTE
RN attempted to place fuentes but was unsuccessful. A coude fuentes kit has been requested from distribution.

## 2023-06-06 NOTE — SIGNIFICANT NOTE
06/06/23 1100   OTHER   Discipline physical therapist   Rehab Time/Intention   Session Not Performed other (see comments)  (pt up ad nav and expresses no mobility concerns at this time per RN; pt plans home at D/C; acute PT will sign off)   Therapy Assessment/Plan (PT)   Criteria for Skilled Interventions Met (PT) no;no problems identified which require skilled intervention

## 2023-06-07 PROBLEM — M86.9 OSTEOMYELITIS: Status: ACTIVE | Noted: 2023-06-07

## 2023-06-07 PROBLEM — R21 RASH OF BACK: Status: ACTIVE | Noted: 2023-06-07

## 2023-06-07 LAB
ANION GAP SERPL CALCULATED.3IONS-SCNC: 10.8 MMOL/L (ref 5–15)
BACTERIA SPEC AEROBE CULT: ABNORMAL
BUN SERPL-MCNC: 18 MG/DL (ref 8–23)
BUN/CREAT SERPL: 23.4 (ref 7–25)
CALCIUM SPEC-SCNC: 8.7 MG/DL (ref 8.6–10.5)
CHLORIDE SERPL-SCNC: 103 MMOL/L (ref 98–107)
CO2 SERPL-SCNC: 22.2 MMOL/L (ref 22–29)
CREAT SERPL-MCNC: 0.77 MG/DL (ref 0.76–1.27)
DEPRECATED RDW RBC AUTO: 40.8 FL (ref 37–54)
EGFRCR SERPLBLD CKD-EPI 2021: 98.1 ML/MIN/1.73
ERYTHROCYTE [DISTWIDTH] IN BLOOD BY AUTOMATED COUNT: 13 % (ref 12.3–15.4)
GLUCOSE SERPL-MCNC: 125 MG/DL (ref 65–99)
HCT VFR BLD AUTO: 36.7 % (ref 37.5–51)
HGB BLD-MCNC: 12.2 G/DL (ref 13–17.7)
MCH RBC QN AUTO: 28.5 PG (ref 26.6–33)
MCHC RBC AUTO-ENTMCNC: 33.2 G/DL (ref 31.5–35.7)
MCV RBC AUTO: 85.7 FL (ref 79–97)
PLATELET # BLD AUTO: 333 10*3/MM3 (ref 140–450)
PMV BLD AUTO: 8.6 FL (ref 6–12)
POTASSIUM SERPL-SCNC: 4.2 MMOL/L (ref 3.5–5.2)
RBC # BLD AUTO: 4.28 10*6/MM3 (ref 4.14–5.8)
SODIUM SERPL-SCNC: 136 MMOL/L (ref 136–145)
VANCOMYCIN TROUGH SERPL-MCNC: 14.5 MCG/ML (ref 5–20)
WBC NRBC COR # BLD: 7.96 10*3/MM3 (ref 3.4–10.8)

## 2023-06-07 PROCEDURE — 80202 ASSAY OF VANCOMYCIN: CPT | Performed by: INTERNAL MEDICINE

## 2023-06-07 PROCEDURE — C1751 CATH, INF, PER/CENT/MIDLINE: HCPCS

## 2023-06-07 PROCEDURE — 25010000002 MORPHINE PER 10 MG: Performed by: INTERNAL MEDICINE

## 2023-06-07 PROCEDURE — 94760 N-INVAS EAR/PLS OXIMETRY 1: CPT

## 2023-06-07 PROCEDURE — 25010000002 VANCOMYCIN PER 500 MG: Performed by: INTERNAL MEDICINE

## 2023-06-07 PROCEDURE — 99232 SBSQ HOSP IP/OBS MODERATE 35: CPT | Performed by: STUDENT IN AN ORGANIZED HEALTH CARE EDUCATION/TRAINING PROGRAM

## 2023-06-07 PROCEDURE — 85027 COMPLETE CBC AUTOMATED: CPT | Performed by: NURSE PRACTITIONER

## 2023-06-07 PROCEDURE — 02HV33Z INSERTION OF INFUSION DEVICE INTO SUPERIOR VENA CAVA, PERCUTANEOUS APPROACH: ICD-10-PCS | Performed by: STUDENT IN AN ORGANIZED HEALTH CARE EDUCATION/TRAINING PROGRAM

## 2023-06-07 PROCEDURE — 94799 UNLISTED PULMONARY SVC/PX: CPT

## 2023-06-07 PROCEDURE — 94761 N-INVAS EAR/PLS OXIMETRY MLT: CPT

## 2023-06-07 PROCEDURE — 80048 BASIC METABOLIC PNL TOTAL CA: CPT | Performed by: NURSE PRACTITIONER

## 2023-06-07 PROCEDURE — 25010000002 CEFTRIAXONE PER 250 MG: Performed by: INTERNAL MEDICINE

## 2023-06-07 PROCEDURE — 94664 DEMO&/EVAL PT USE INHALER: CPT

## 2023-06-07 RX ORDER — BISACODYL 5 MG/1
5 TABLET, DELAYED RELEASE ORAL DAILY PRN
Status: DISCONTINUED | OUTPATIENT
Start: 2023-06-07 | End: 2023-06-08 | Stop reason: HOSPADM

## 2023-06-07 RX ORDER — SODIUM CHLORIDE 0.9 % (FLUSH) 0.9 %
10 SYRINGE (ML) INJECTION EVERY 12 HOURS SCHEDULED
Status: DISCONTINUED | OUTPATIENT
Start: 2023-06-07 | End: 2023-06-08 | Stop reason: HOSPADM

## 2023-06-07 RX ORDER — SODIUM CHLORIDE 0.9 % (FLUSH) 0.9 %
20 SYRINGE (ML) INJECTION AS NEEDED
Status: DISCONTINUED | OUTPATIENT
Start: 2023-06-07 | End: 2023-06-08 | Stop reason: HOSPADM

## 2023-06-07 RX ORDER — DIPHENHYDRAMINE HCL 25 MG
25 CAPSULE ORAL EVERY 6 HOURS PRN
Status: DISCONTINUED | OUTPATIENT
Start: 2023-06-07 | End: 2023-06-08 | Stop reason: HOSPADM

## 2023-06-07 RX ORDER — BISACODYL 10 MG
10 SUPPOSITORY, RECTAL RECTAL DAILY PRN
Status: DISCONTINUED | OUTPATIENT
Start: 2023-06-07 | End: 2023-06-08 | Stop reason: HOSPADM

## 2023-06-07 RX ORDER — SODIUM CHLORIDE 9 MG/ML
40 INJECTION, SOLUTION INTRAVENOUS AS NEEDED
Status: DISCONTINUED | OUTPATIENT
Start: 2023-06-07 | End: 2023-06-08 | Stop reason: HOSPADM

## 2023-06-07 RX ORDER — LACTULOSE 10 G/15ML
10 SOLUTION ORAL ONCE
Status: COMPLETED | OUTPATIENT
Start: 2023-06-07 | End: 2023-06-07

## 2023-06-07 RX ORDER — UREA 10 %
3 LOTION (ML) TOPICAL DAILY
Status: DISCONTINUED | OUTPATIENT
Start: 2023-06-07 | End: 2023-06-07

## 2023-06-07 RX ORDER — UREA 10 %
3 LOTION (ML) TOPICAL NIGHTLY PRN
Status: DISCONTINUED | OUTPATIENT
Start: 2023-06-07 | End: 2023-06-08 | Stop reason: HOSPADM

## 2023-06-07 RX ORDER — AMOXICILLIN 250 MG
2 CAPSULE ORAL 2 TIMES DAILY
Status: DISCONTINUED | OUTPATIENT
Start: 2023-06-07 | End: 2023-06-08 | Stop reason: HOSPADM

## 2023-06-07 RX ORDER — POLYETHYLENE GLYCOL 3350 17 G/17G
17 POWDER, FOR SOLUTION ORAL 2 TIMES DAILY
Status: DISCONTINUED | OUTPATIENT
Start: 2023-06-07 | End: 2023-06-08 | Stop reason: HOSPADM

## 2023-06-07 RX ORDER — SODIUM CHLORIDE 0.9 % (FLUSH) 0.9 %
10 SYRINGE (ML) INJECTION AS NEEDED
Status: DISCONTINUED | OUTPATIENT
Start: 2023-06-07 | End: 2023-06-08 | Stop reason: HOSPADM

## 2023-06-07 RX ADMIN — LEVOTHYROXINE SODIUM 50 MCG: 0.05 TABLET ORAL at 09:10

## 2023-06-07 RX ADMIN — Medication 10 ML: at 09:34

## 2023-06-07 RX ADMIN — VANCOMYCIN HYDROCHLORIDE 1000 MG: 1 INJECTION, SOLUTION INTRAVENOUS at 07:17

## 2023-06-07 RX ADMIN — MORPHINE SULFATE 2 MG: 2 INJECTION, SOLUTION INTRAMUSCULAR; INTRAVENOUS at 04:40

## 2023-06-07 RX ADMIN — Medication 3 MG: at 20:17

## 2023-06-07 RX ADMIN — OXYCODONE AND ACETAMINOPHEN 1 TABLET: 5; 325 TABLET ORAL at 15:04

## 2023-06-07 RX ADMIN — MORPHINE SULFATE 2 MG: 2 INJECTION, SOLUTION INTRAMUSCULAR; INTRAVENOUS at 11:12

## 2023-06-07 RX ADMIN — CEFTRIAXONE 2 G: 2 INJECTION, POWDER, FOR SOLUTION INTRAMUSCULAR; INTRAVENOUS at 17:55

## 2023-06-07 RX ADMIN — LACTULOSE 10 G: 10 SOLUTION ORAL at 15:50

## 2023-06-07 RX ADMIN — DOCUSATE SODIUM 50MG AND SENNOSIDES 8.6MG 2 TABLET: 8.6; 5 TABLET, FILM COATED ORAL at 20:16

## 2023-06-07 RX ADMIN — BUDESONIDE AND FORMOTEROL FUMARATE DIHYDRATE 2 PUFF: 160; 4.5 AEROSOL RESPIRATORY (INHALATION) at 08:04

## 2023-06-07 RX ADMIN — POLYETHYLENE GLYCOL 3350 17 G: 17 POWDER, FOR SOLUTION ORAL at 20:18

## 2023-06-07 RX ADMIN — OXYCODONE AND ACETAMINOPHEN 1 TABLET: 5; 325 TABLET ORAL at 20:25

## 2023-06-07 RX ADMIN — BUDESONIDE AND FORMOTEROL FUMARATE DIHYDRATE 2 PUFF: 160; 4.5 AEROSOL RESPIRATORY (INHALATION) at 20:38

## 2023-06-07 RX ADMIN — SODIUM CHLORIDE 75 ML/HR: 9 INJECTION, SOLUTION INTRAVENOUS at 01:08

## 2023-06-07 RX ADMIN — DOCUSATE SODIUM 50MG AND SENNOSIDES 8.6MG 2 TABLET: 8.6; 5 TABLET, FILM COATED ORAL at 09:10

## 2023-06-07 RX ADMIN — POLYETHYLENE GLYCOL 3350 17 G: 17 POWDER, FOR SOLUTION ORAL at 11:13

## 2023-06-07 NOTE — PLAN OF CARE
Goal Outcome Evaluation:   Pt AxOx4, calm and cooperative. VSS. .  MRI pelvis done this afternoon.  Reg diet for dinner,  Pt takes meds whole with water, see MAR. IV fluids at 75 ml/hr. IV Rocephin given. PRN morphine given  for pain control, with good effect. Urinal at bedside.  RN will continue to monitor.

## 2023-06-07 NOTE — SIGNIFICANT NOTE
"   06/07/23 1758   PICC Single Lumen 06/07/23 Right Basilic   Placement date: If unknown, DO NOT use \"Add Comment\" note/Placement time: If unknown, DO NOT use \"Add Comment\" note: 06/07/23 1756   Hand Hygiene Completed: Yes  Size (Fr): 4  Description (optional): Lot# [id number]; Exp: 2024-06-30  Length (cm): 46 cm ...   Site Assessment Clean;Dry;Intact   #1 Lumen Status Blood return noted;Flushed;Normal saline locked   Length shyla (cm) 46 cm   Extremity Circumference (cm) 29 cm   Dressing Type Border Dressing;Transparent;Securing device;Antimicrobial dressing/disc   Dressing Status Clean;Dry;Intact   Dressing Change Due 06/14/23   Indication/Daily Review of Necessity long-term IV access >7 days     3 needles, 2 guidewires, 1 scalpel properly disposed of post procedure      "

## 2023-06-07 NOTE — PROGRESS NOTES
Name: Girish Greenberg ADMIT: 2023   : 1955  PCP: Regina Landis MD    MRN: [id number] LOS: 1 days   AGE/SEX: 67 y.o. male  ROOM: [facility]     Subjective   Subjective   Patient appears comfortable and in no apparent distress.  Tolerating diet.  Constipation persists & posterior rash improving with topical Benadryl.  Wife at bedside.       Objective   Objective   Vital Signs  Temp:  [97.4 °F (36.3 °C)-98.3 °F (36.8 °C)] 98.2 °F (36.8 °C)  Heart Rate:  [75-94] 79  Resp:  [16-18] 18  BP: (118-123)/(69-79) 119/76  SpO2:  [95 %-96 %] 96 %  on   ;   Device (Oxygen Therapy): room air  Body mass index is 27.54 kg/m².    Physical Exam  Constitutional:       General: He is not in acute distress.     Appearance: He is not toxic-appearing.   Cardiovascular:      Rate and Rhythm: Normal rate and regular rhythm.   Pulmonary:      Effort: Pulmonary effort is normal.      Breath sounds: Normal breath sounds.   Abdominal:      General: Bowel sounds are normal.      Palpations: Abdomen is soft.   Musculoskeletal:      Right lower leg: No edema.      Left lower leg: No edema.   Skin:     General: Skin is warm and dry.      Findings: Rash (posterior trunk) present.   Neurological:      Mental Status: He is alert.     Physical exam performed on 2023 as per above.    Results Review     I reviewed the patient's new clinical results.  Results from last 7 days   Lab Units 23  0704 23  0812 23  0320   WBC 10*3/mm3 7.96 7.49 8.70   HEMOGLOBIN g/dL 12.2* 12.3* 13.2   PLATELETS 10*3/mm3 333 330 359     Results from last 7 days   Lab Units 23  0704 23  0812 23  0320   SODIUM mmol/L 136 139 138   POTASSIUM mmol/L 4.2 3.9 4.8   CHLORIDE mmol/L 103 104 103   CO2 mmol/L 22.2 25.0 25.1   BUN mg/dL 18 14 33*   CREATININE mg/dL 0.77 0.69* 0.91   GLUCOSE mg/dL 125* 111* 124*   EGFR mL/min/1.73 98.1 101.4 92.4     Results from last 7 days   Lab Units 23  0320   ALBUMIN g/dL 4.0   BILIRUBIN mg/dL 0.3    ALK PHOS U/L 39   AST (SGOT) U/L 18   ALT (SGPT) U/L 18     Results from last 7 days   Lab Units 06/07/23  0704 06/06/23  0812 06/05/23  0320   CALCIUM mg/dL 8.7 9.4 10.5   ALBUMIN g/dL  --   --  4.0     Results from last 7 days   Lab Units 06/05/23  1742 06/05/23  0320   LACTATE mmol/L 1.1 1.2     No results found for: HGBA1C, POCGLU    No radiology results for the last day  I have personally reviewed all medications:  Scheduled Medications  budesonide-formoterol, 2 puff, Inhalation, BID - RT  cefTRIAXone, 2 g, Intravenous, Q24H  lactulose, 10 g, Oral, Once  levothyroxine, 50 mcg, Oral, Daily  senna-docusate sodium, 2 tablet, Oral, BID   And  polyethylene glycol, 17 g, Oral, BID  sodium chloride, 10 mL, Intravenous, Q12H    Infusions  sodium chloride, 75 mL/hr, Last Rate: 75 mL/hr (06/07/23 0108)    Diet  Diet: Regular/House Diet; Texture: Regular Texture (IDDSI 7); Fluid Consistency: Thin (IDDSI 0)    I have personally reviewed:  [x]  Laboratory   [x]  Microbiology   []  Radiology   []  EKG/Telemetry  []  Cardiology/Vascular   []  Pathology    []  Records       Assessment/Plan     Active Hospital Problems    Diagnosis  POA    **Abnormal CT of the abdomen [R93.5]  Yes    Osteomyelitis, pelvic [M86.9]  Yes    Rash of back [R21]  Unknown    History of prostate cancer [Z85.46]  Not Applicable    Abdominal pain, RLQ [R10.9]  Yes    Hypothyroidism (acquired) [E03.9]  Yes      Resolved Hospital Problems   No resolved problems to display.       Mr. Greenberg is a 67 y.o. former smoker with a history of COPD, GERD, BPH, prostate cancer that presents to [facility] complaining of abdominal pain.       Abnormal CT of the abdomen / pelvic osteomyelitis:   MRI pelvis confirms osteomyelitis.  Urology following management of fistula plan continue Sanchez after discharge & follow-up with Dr. Gaines at [facility] for anticipated excision of pubic symphysis & cystectomy with urinary diversion.  Blood cultures x2 NGTD & urine  culture growing Klebsiella--ID recommend continue ceftriaxone--anticipate 6 weeks course antibiotics.  Ordering PICC line today.  PT signed off.  Falls precautions.      Rash:  Mild sparse papular lesions on posterior trunk appear to be resolving.  Suspect contact dermatitis given rash localized to posterior trunk.  Vancomycin discontinued per ID.  Continue to monitor for progressive of rash with IV ceftriaxone & provide Benadryl PRN.       History of prostate cancer: Status post prostatectomy confirmed on CT.  Consult urology given irregular soft tissue thickening of bladder base extending to pubic symphysis with associated erosive changes of bilateral medial pubic bones.       Abdominal pain, RLQ: Symptom management switched from IV morphine as needed to oral formulation opioid PRN.  Continuous pulse oximetry.  Avoid sedation.  Bowel regimen provided.  Diet tolerated.       Hypothyroidism (acquired): Levothyroxine continued.       Reported history of COVID 19 diagnosed on 6/2/2023.  Status post 3 days course Paxlovid.  PCR negative COVID-19 this admission.  Discontinue Paxlovid.  Oxygen saturation 96% on room air.  Infection control maintains isolation for 10 days from positive test ending on 6/12/2023.    SCDs for DVT prophylaxis.  Full code.  Discussed with patient and nursing staff.  Anticipate discharge home with family when cleared by consultants. / possibly home tomorrow with HH & family pending PICC line placement as well.      DIONE Parra  [facility]  06/07/23  14:51 EDT

## 2023-06-07 NOTE — PROGRESS NOTES
"   LOS: 1 day   Patient Care Team:  Regina Landis MD as PCP - General (Internal Medicine)  Regina Landis MD as PCP - Family Medicine  Slade Borrego MD as Consulting Physician (Urology)  Brian Sabillon MD as Consulting Physician (General Surgery)      Subjective   Interval History: Catheter placed overnight with robust output. NO problems with catheter.    Objective     ROS   12 POINT NEG ROS PERTINENT IN HPI      Vital Signs  Temp:  [97.4 °F (36.3 °C)-98.3 °F (36.8 °C)] 98.3 °F (36.8 °C)  Heart Rate:  [76-94] 77  Resp:  [16-18] 18  BP: (118-125)/(69-79) 123/79      Intake/Output Summary (Last 24 hours) at 6/7/2023 0748  Last data filed at 6/6/2023 1406  Gross per 24 hour   Intake 0 ml   Output 1275 ml   Net -1275 ml       Flowsheet Rows      Flowsheet Row First Filed Value   Admission Height 182.9 cm (72\") Documented at 06/05/2023 0239   Admission Weight 90.7 kg (200 lb) Documented at 06/05/2023 0239            Physical Exam:     General appearance: alert, cooperative, oriented  Sanchez intact, urine yellow and clear        Results Review:     I reviewed the patient's new clinical results.  Lab Results (all)       Procedure Component Value Units Date/Time    CBC (No Diff) [[id number]]  (Abnormal) Collected: 06/07/23 0704    Specimen: Blood Updated: 06/07/23 0738     WBC 7.96 10*3/mm3      RBC 4.28 10*6/mm3      Hemoglobin 12.2 g/dL      Hematocrit 36.7 %      MCV 85.7 fL      MCH 28.5 pg      MCHC 33.2 g/dL      RDW 13.0 %      RDW-SD 40.8 fl      MPV 8.6 fL      Platelets 333 10*3/mm3     Vancomycin, Trough [[id number]] Collected: 06/07/23 0704    Specimen: Blood Updated: 06/07/23 0725    Basic Metabolic Panel [[id number]] Collected: 06/07/23 0704    Specimen: Blood Updated: 06/07/23 0725    Blood Culture - Blood, Arm, Left [[id number]]  (Normal) Collected: 06/05/23 0627    Specimen: Blood from Arm, Left Updated: 06/07/23 0630     Blood Culture No growth at 2 days    Urine Culture - Urine, Urine, Clean " Catch [[id number]]  (Abnormal)  (Susceptibility) Collected: 06/05/23 1115    Specimen: Urine, Clean Catch Updated: 06/07/23 0609     Urine Culture >100,000 CFU/mL Klebsiella pneumoniae ssp pneumoniae    Narrative:      Colonization of the urinary tract without infection is common. Treatment is discouraged unless the patient is symptomatic, pregnant, or undergoing an invasive urologic procedure.    Susceptibility        Klebsiella pneumoniae ssp pneumoniae      AUSTEN      Ampicillin Resistant      Ampicillin + Sulbactam Susceptible      Cefazolin Susceptible      Cefepime Susceptible      Ceftazidime Susceptible      Ceftriaxone Susceptible      Gentamicin Susceptible      Levofloxacin Susceptible      Nitrofurantoin Intermediate      Piperacillin + Tazobactam Susceptible      Trimethoprim + Sulfamethoxazole Resistant                           Blood Culture - Blood, Hand, Right [[id number]]  (Normal) Collected: 06/05/23 0928    Specimen: Blood from Hand, Right Updated: 06/06/23 1000     Blood Culture No growth at 24 hours    Basic Metabolic Panel [[id number]]  (Abnormal) Collected: 06/06/23 0812    Specimen: Blood Updated: 06/06/23 0927     Glucose 111 mg/dL      BUN 14 mg/dL      Creatinine 0.69 mg/dL      Sodium 139 mmol/L      Potassium 3.9 mmol/L      Chloride 104 mmol/L      CO2 25.0 mmol/L      Calcium 9.4 mg/dL      BUN/Creatinine Ratio 20.3     Anion Gap 10.0 mmol/L      eGFR 101.4 mL/min/1.73     Narrative:      GFR Normal >60  Chronic Kidney Disease <60  Kidney Failure <15      CBC (No Diff) [[id number]]  (Abnormal) Collected: 06/06/23 0812    Specimen: Blood Updated: 06/06/23 0905     WBC 7.49 10*3/mm3      RBC 4.21 10*6/mm3      Hemoglobin 12.3 g/dL      Hematocrit 36.7 %      MCV 87.2 fL      MCH 29.2 pg      MCHC 33.5 g/dL      RDW 13.5 %      RDW-SD 42.4 fl      MPV 8.6 fL      Platelets 330 10*3/mm3     Lactic Acid, Plasma [[id number]]  (Normal) Collected: 06/05/23 1742    Specimen: Blood Updated: 06/05/23  1818     Lactate 1.1 mmol/L     Urinalysis With Microscopic If Indicated (No Culture) - Urine, Clean Catch [[id number]]  (Abnormal) Collected: 06/05/23 1115    Specimen: Urine, Clean Catch Updated: 06/05/23 1159     Color, UA Yellow     Appearance, UA Cloudy     pH, UA 6.0     Specific Gravity, UA >=1.030     Glucose, UA Negative     Ketones, UA Negative     Bilirubin, UA Negative     Blood, UA Moderate (2+)     Protein, UA 30 mg/dL (1+)     Leuk Esterase, UA Large (3+)     Nitrite, UA Negative     Urobilinogen, UA 0.2 E.U./dL    Urinalysis, Microscopic Only - Urine, Clean Catch [[id number]]  (Abnormal) Collected: 06/05/23 1115    Specimen: Urine, Clean Catch Updated: 06/05/23 1159     RBC, UA 6-12 /HPF      WBC, UA Too Numerous to Count /HPF      Bacteria, UA 4+ /HPF      Squamous Epithelial Cells, UA 0-2 /HPF      Hyaline Casts, UA 0-2 /LPF      Methodology Automated Microscopy    Respiratory Panel PCR w/COVID-19(SARS-CoV-2) BHAVYA/KEYANA/LUISA/PAD/COR/MAD/ASTON In-House, NP Swab in UTM/VTM, 3-4 HR TAT - Swab, Nasopharynx [[id number]]  (Normal) Collected: 06/05/23 0809    Specimen: Swab from Nasopharynx Updated: 06/05/23 0948     ADENOVIRUS, PCR Not Detected     Coronavirus 229E Not Detected     Coronavirus HKU1 Not Detected     Coronavirus NL63 Not Detected     Coronavirus OC43 Not Detected     COVID19 Not Detected     Human Metapneumovirus Not Detected     Human Rhinovirus/Enterovirus Not Detected     Influenza A PCR Not Detected     Influenza B PCR Not Detected     Parainfluenza Virus 1 Not Detected     Parainfluenza Virus 2 Not Detected     Parainfluenza Virus 3 Not Detected     Parainfluenza Virus 4 Not Detected     RSV, PCR Not Detected     Bordetella pertussis pcr Not Detected     Bordetella parapertussis PCR Not Detected     Chlamydophila pneumoniae PCR Not Detected     Mycoplasma pneumo by PCR Not Detected    Narrative:      In the setting of a positive respiratory panel with a viral infection PLUS a negative  procalcitonin without other underlying concern for bacterial infection, consider observing off antibiotics or discontinuation of antibiotics and continue supportive care. If the respiratory panel is positive for atypical bacterial infection (Bordetella pertussis, Chlamydophila pneumoniae, or Mycoplasma pneumoniae), consider antibiotic de-escalation to target atypical bacterial infection.    Sedimentation Rate [[id number]]  (Abnormal) Collected: 06/05/23 0320    Specimen: Blood Updated: 06/05/23 0647     Sed Rate 28 mm/hr     C-reactive Protein [[id number]]  (Abnormal) Collected: 06/05/23 0320    Specimen: Blood Updated: 06/05/23 0612     C-Reactive Protein 2.29 mg/dL     [place] [facility] [[id number]] Collected: 06/05/23 0320    Specimen: Blood Updated: 06/05/23 0430    Narrative:      The following orders were created for panel order [place] Draw.  Procedure                               Abnormality         Status                     ---------                               -----------         ------                     Green Top (Gel)[[id number]]                                  Final result               Lavender Top[[id number]]                                     Final result               Gold Top - SST[[id number]]                                   Final result               Light Blue Top[[id number]]                                   Final result                 Please view results for these tests on the individual orders.    Green Top (Gel) [[id number]] Collected: 06/05/23 0320    Specimen: Blood Updated: 06/05/23 0430     Extra Tube Hold for add-ons.     Comment: Auto resulted.       Lavender Top [[id number]] Collected: 06/05/23 0320    Specimen: Blood Updated: 06/05/23 0430     Extra Tube hold for add-on     Comment: Auto resulted       Gold Top - SST [[id number]] Collected: 06/05/23 0320    Specimen: Blood Updated: 06/05/23 0430     Extra Tube Hold for add-ons.     Comment: Auto resulted.       Light Blue Top [[id number]]  Collected: 06/05/23 0320    Specimen: Blood Updated: 06/05/23 0430     Extra Tube Hold for add-ons.     Comment: Auto resulted       Lipase [[id number]]  (Normal) Collected: 06/05/23 0320    Specimen: Blood Updated: 06/05/23 0349     Lipase 31 U/L     Comprehensive Metabolic Panel [[id number]]  (Abnormal) Collected: 06/05/23 0320    Specimen: Blood Updated: 06/05/23 0349     Glucose 124 mg/dL      BUN 33 mg/dL      Creatinine 0.91 mg/dL      Sodium 138 mmol/L      Potassium 4.8 mmol/L      Chloride 103 mmol/L      CO2 25.1 mmol/L      Calcium 10.5 mg/dL      Total Protein 7.3 g/dL      Albumin 4.0 g/dL      ALT (SGPT) 18 U/L      AST (SGOT) 18 U/L      Alkaline Phosphatase 39 U/L      Total Bilirubin 0.3 mg/dL      Globulin 3.3 gm/dL      A/G Ratio 1.2 g/dL      BUN/Creatinine Ratio 36.3     Anion Gap 9.9 mmol/L      eGFR 92.4 mL/min/1.73     Narrative:      GFR Normal >60  Chronic Kidney Disease <60  Kidney Failure <15      Lactic Acid, Plasma [[id number]]  (Normal) Collected: 06/05/23 0320    Specimen: Blood Updated: 06/05/23 0347     Lactate 1.2 mmol/L     CBC & Differential [[id number]]  (Abnormal) Collected: 06/05/23 0320    Specimen: Blood Updated: 06/05/23 0331    Narrative:      The following orders were created for panel order CBC & Differential.  Procedure                               Abnormality         Status                     ---------                               -----------         ------                     CBC Auto Differential[[id number]]        Abnormal            Final result                 Please view results for these tests on the individual orders.    CBC Auto Differential [[id number]]  (Abnormal) Collected: 06/05/23 0320    Specimen: Blood Updated: 06/05/23 0331     WBC 8.70 10*3/mm3      RBC 4.58 10*6/mm3      Hemoglobin 13.2 g/dL      Hematocrit 39.4 %      MCV 86.0 fL      MCH 28.8 pg      MCHC 33.5 g/dL      RDW 13.0 %      RDW-SD 40.9 fl      MPV 8.6 fL      Platelets 359 10*3/mm3       Neutrophil % 81.4 %      Lymphocyte % 11.8 %      Monocyte % 5.7 %      Eosinophil % 0.6 %      Basophil % 0.2 %      Immature Grans % 0.3 %      Neutrophils, Absolute 7.07 10*3/mm3      Lymphocytes, Absolute 1.03 10*3/mm3      Monocytes, Absolute 0.50 10*3/mm3      Eosinophils, Absolute 0.05 10*3/mm3      Basophils, Absolute 0.02 10*3/mm3      Immature Grans, Absolute 0.03 10*3/mm3      nRBC 0.1 /100 WBC             Imaging Results (All)       Procedure Component Value Units Date/Time    MRI Pelvis With & Without Contrast [[id number]] Collected: 06/05/23 1516     Updated: 06/06/23 0914    Narrative:      MRI PELVIS WITH AND WITHOUT CONTRAST     HISTORY: 67-year-old male with history of a pelvic fracture in October  with pelvic pain since that time. Now with right lower quadrant pain.     TECHNIQUE: MRI pelvis includes axial T1 fat-sat, T2 fat-sat, as well as  coronal T1, STIR sequences. Gadolinium was administered intravenously  followed by axial and coronal T1 fat-saturated sequences.     COMPARISON: CT abdomen and pelvis 06/05/2023.     FINDINGS: There is abnormal widening of the pubic symphyseal joint and  there is a pubic symphyseal joint effusion. There are erosive changes  within the left and right pubic body and there is surrounding bone  marrow and soft tissue edema. Soft tissue edema extends into the hip  adductor pectineus musculature. There is also surrounding soft tissue  enhancement with fluid signal that communicates with the bladder and  extends into the posterior and superior aspect of the pubic symphyseal  joint with surrounding soft tissue thickening. There is also a T2 low  signal structure surrounded by fluid along the posterior-superior margin  of the pubic symphyseal joint and this low signal structure is  consistent with calcification that measures 1.6 x 0.6 x 1.8 cm AP and is  along the anterior aspect of the bladder base. This is suspicious for a  pubic symphyseal bone fragment that has  migrated into the urinary  bladder or surrounded by fluid that communicates with the urinary  bladder. There is also abnormal soft tissue edema and enhancement within  the distal right rectus abdominis muscle. This contains a thin  nonenhancing focus of fluid signal that measures approximately 7 x 5 mm,  consistent with a microabscess. There is surrounding myositis. No  evidence for drainage to the skin surface.     There is no hip fracture or osteonecrosis. Periarticular musculature  appears normal. There is no evidence for septic arthritis at either  sacroiliac joint. There is sclerosis within both sacral alae, consistent  with nonacute bilateral sacral alar fractures that appear to be healing.       Impression:      1. 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 There is fluid signal within the posterior-  superior pubic symphyseal joint that appears to communicate with the  bladder lumen and there is surrounding inflammatory change with bladder  wall thickening. There is also a low signal structure surrounded by  fluid along the posterior margin of the pubic symphyseal joint that  appears to represent a calcification or bone fragment within this  collection that communicates with the urinary bladder lumen.  2. Healing bilateral sacral alar fractures.     This report was finalized on 6/6/2023 9:11 AM by Dr. Julián Aragon M.D.       CT Abdomen Pelvis With Contrast [[id number]] Collected: 06/05/23 0554     Updated: 06/05/23 0554    Narrative:        Patient: PAO COLEMAN  Time Out: 05:53  Exam(s): CT ABDOMEN + PELVIS With Contrast     EXAM:    CT Abdomen and Pelvis With Intravenous Contrast    CLINICAL HISTORY:     Reason for exam: RLQ pain.    TECHNIQUE:    Axial computed tomography images of the abdomen and pelvis with   intravenous contrast.  CTDI is 13.27 mGy and DLP is 735.3 mGy-cm.  This    CT exam was performed according to the principle of ALARA (As Low As   Reasonably Achievable) by using one or more of the following dose   reduction techniques: automated exposure control, adjustment of the mA   and or kV according to patient size, and or use of iterative   reconstruction technique.    COMPARISON:    No relevant prior studies available.    FINDINGS:    Lung bases:  Unremarkable.  No mass.  No consolidation.     ABDOMEN:    Liver: Multiple cysts in the liver measuring up to 2.7 cm.  No mass.    Gallbladder and bile ducts:  Unremarkable.  No calcified stones.  No   ductal dilation.    Pancreas:  Unremarkable.  No mass.  No ductal dilation.    Spleen:  Unremarkable.  No splenomegaly.    Adrenals:  Unremarkable.  No mass.    Kidneys and ureters:  Unremarkable.  No solid mass.  No hydronephrosis.    Stomach and bowel:  Unremarkable.  No obstruction.  No mucosal   thickening.     PELVIS:    Appendix:  No findings to suggest acute appendicitis.    Bladder:  Multiple calcifications in the bladder base measuring up to   15 mm.  Bladder wall thickening along the bladder base with anterior   bladder base mural defect and fluid extending into the pubic symphysis   with erosive changes of the bilateral medial pubic bones.      Reproductive:  Status post prostatectomy.      ABDOMEN and PELVIS:    Intraperitoneal space:  Unremarkable.  No free air.  No significant   fluid collection.    Bones joints:  No acute fracture.  No dislocation.    Soft tissues:  Unremarkable.    Vasculature:  Unremarkable.  No abdominal aortic aneurysm.    Lymph nodes:  Unremarkable.  No enlarged lymph nodes.    IMPRESSION:       Status post prostatectomy. Irregular soft tissue thickening of the   bladder base with mural defect along the anterior bladder wall extending   to the pubic symphysis and associated erosive changes of the bilateral   medial pubic bones which may be secondary to malignancy or contained   perforation with chronic  osteomyelitis of the pubic symphysis. Cystoscopy   or MRI Pelvis may be obtained for further evaluation.      Impression:          Electronically signed by Cr Don M.D. on 06-05-23 at 0553            Medication Review:   Current Facility-Administered Medications   Medication Dose Route Frequency Provider Last Rate Last Admin    acetaminophen (TYLENOL) tablet 650 mg  650 mg Oral Q4H PRN Vijay Skelton APRN        Or    acetaminophen (TYLENOL) 160 MG/5ML solution 650 mg  650 mg Oral Q4H PRN Vijay Skelton APRN        Or    acetaminophen (TYLENOL) suppository 650 mg  650 mg Rectal Q4H PRN Vijay Skelton APRN        albuterol (PROVENTIL) nebulizer solution 0.083% 2.5 mg/3mL  2.5 mg Nebulization Q6H PRN Vijay Skelton APRN        sennosides-docusate (PERICOLACE) 8.6-50 MG per tablet 2 tablet  2 tablet Oral BID Vijay Skelton APRN   2 tablet at 06/06/23 2026    And    polyethylene glycol (MIRALAX) packet 17 g  17 g Oral Daily PRN Vijay Skelton APRN   17 g at 06/06/23 1820    And    bisacodyl (DULCOLAX) EC tablet 5 mg  5 mg Oral Daily PRN Vijay Skelton APRN        And    bisacodyl (DULCOLAX) suppository 10 mg  10 mg Rectal Daily PRN Vijay Skelton APRN        budesonide-formoterol (SYMBICORT) 160-4.5 MCG/ACT inhaler 2 puff  2 puff Inhalation BID - RT Garry Burch MD   2 puff at 06/06/23 2127    cefTRIAXone (ROCEPHIN) 2 g in sodium chloride 0.9 % 100 mL IVPB-VTB  2 g Intravenous Q24H Garry Burch  mL/hr at 06/06/23 1612 2 g at 06/06/23 1612    cyclobenzaprine (FLEXERIL) tablet 5 mg  5 mg Oral TID PRN Vijay Skelton APRN   5 mg at 06/05/23 1555    diphenhydrAMINE-zinc acetate 2-0.1 % cream 1 application  1 application Topical TID PRN Ed, Lizbeth M, APRN        levothyroxine (SYNTHROID, LEVOTHROID) tablet 50 mcg  50 mcg Oral Daily Vijay Skelton APRN   50 mcg at 06/05/23 1555    morphine injection 2 mg  2 mg Intravenous Q4H PRN Garry Burch MD   2 mg at 06/07/23  0440    And    naloxone (NARCAN) injection 0.4 mg  0.4 mg Intravenous Q5 Min PRN Garry Burch MD        ondansetron (ZOFRAN) tablet 4 mg  4 mg Oral Q6H PRN Vijay Skelton APRN        Or    ondansetron (ZOFRAN) injection 4 mg  4 mg Intravenous Q6H PRN Vijay Skelton APRN        oxyCODONE-acetaminophen (PERCOCET) 5-325 MG per tablet 1 tablet  1 tablet Oral Q4H PRN Garry Burch MD   1 tablet at 06/06/23 1738    Pharmacy to dose vancomycin   Does not apply Continuous PRN Garry Burch MD        sodium chloride 0.9 % flush 10 mL  10 mL Intravenous PRN Emergency, Triage Protocol, MD        sodium chloride 0.9 % flush 10 mL  10 mL Intravenous Q12H Vijay Skelton APRN   10 mL at 06/06/23 2026    sodium chloride 0.9 % flush 10 mL  10 mL Intravenous PRN Vijay Skelton APRN        sodium chloride 0.9 % infusion 40 mL  40 mL Intravenous PRN Vijay Skelton APRN        sodium chloride 0.9 % infusion  75 mL/hr Intravenous Continuous Garry Burch MD 75 mL/hr at 06/07/23 0108 75 mL/hr at 06/07/23 0108    vancomycin (VANCOCIN) 1000 mg/200 mL dextrose 5% IVPB  1,000 mg Intravenous Q12H Garry Burch MD   1,000 mg at 06/07/23 0717       Current Facility-Administered Medications:     acetaminophen (TYLENOL) tablet 650 mg, 650 mg, Oral, Q4H PRN **OR** acetaminophen (TYLENOL) 160 MG/5ML solution 650 mg, 650 mg, Oral, Q4H PRN **OR** acetaminophen (TYLENOL) suppository 650 mg, 650 mg, Rectal, Q4H PRN, Vijay Skelton APRN    albuterol (PROVENTIL) nebulizer solution 0.083% 2.5 mg/3mL, 2.5 mg, Nebulization, Q6H PRN, Vijay Skelton APRN    sennosides-docusate (PERICOLACE) 8.6-50 MG per tablet 2 tablet, 2 tablet, Oral, BID, 2 tablet at 06/06/23 2026 **AND** polyethylene glycol (MIRALAX) packet 17 g, 17 g, Oral, Daily PRN, 17 g at 06/06/23 1820 **AND** bisacodyl (DULCOLAX) EC tablet 5 mg, 5 mg, Oral, Daily PRN **AND** bisacodyl (DULCOLAX) suppository 10 mg, 10 mg, Rectal, Daily PRN, Vijay Skelton,  DIONE    budesonide-formoterol (SYMBICORT) 160-4.5 MCG/ACT inhaler 2 puff, 2 puff, Inhalation, BID - RT, Garry Burch MD, 2 puff at 06/06/23 2127    cefTRIAXone (ROCEPHIN) 2 g in sodium chloride 0.9 % 100 mL IVPB-VTB, 2 g, Intravenous, Q24H, Garry Burch MD, Last Rate: 200 mL/hr at 06/06/23 1612, 2 g at 06/06/23 1612    cyclobenzaprine (FLEXERIL) tablet 5 mg, 5 mg, Oral, TID PRN, Vijay Skelton APRN, 5 mg at 06/05/23 1555    diphenhydrAMINE-zinc acetate 2-0.1 % cream 1 application, 1 application, Topical, TID PRN, Lizbeth Ward APRN    levothyroxine (SYNTHROID, LEVOTHROID) tablet 50 mcg, 50 mcg, Oral, Daily, Vijay Skelton APRN, 50 mcg at 06/05/23 1555    morphine injection 2 mg, 2 mg, Intravenous, Q4H PRN, 2 mg at 06/07/23 0440 **AND** naloxone (NARCAN) injection 0.4 mg, 0.4 mg, Intravenous, Q5 Min PRN, Garry Burch MD    ondansetron (ZOFRAN) tablet 4 mg, 4 mg, Oral, Q6H PRN **OR** ondansetron (ZOFRAN) injection 4 mg, 4 mg, Intravenous, Q6H PRN, Vijay Skelton APRN    oxyCODONE-acetaminophen (PERCOCET) 5-325 MG per tablet 1 tablet, 1 tablet, Oral, Q4H PRN, Garry Burch MD, 1 tablet at 06/06/23 1738    Pharmacy to dose vancomycin, , Does not apply, Continuous PRN, Garry Burch MD    sodium chloride 0.9 % flush 10 mL, 10 mL, Intravenous, PRN, Emergency, Triage Protocol, MD    sodium chloride 0.9 % flush 10 mL, 10 mL, Intravenous, Q12H, Vijay Skelton APRN, 10 mL at 06/06/23 2026    sodium chloride 0.9 % flush 10 mL, 10 mL, Intravenous, PRN, Vijay Skelton APRN    sodium chloride 0.9 % infusion 40 mL, 40 mL, Intravenous, PRN, Vijay Skelton APRN    sodium chloride 0.9 % infusion, 75 mL/hr, Intravenous, Continuous, Garry Burch MD, Last Rate: 75 mL/hr at 06/07/23 0108, 75 mL/hr at 06/07/23 0108    vancomycin (VANCOCIN) 1000 mg/200 mL dextrose 5% IVPB, 1,000 mg, Intravenous, Q12H, Garry Burch MD, 1,000 mg at 06/07/23 0717  Medications Discontinued  During This Encounter   Medication Reason    azithromycin (Zithromax Z-Shahram) 250 MG tablet     morphine injection 2 mg     naloxone (NARCAN) injection 0.4 mg        Assessment & Plan         Abnormal CT of the abdomen    History of prostate cancer    Abdominal pain, RLQ    Hypothyroidism (acquired)  History of radiation therapy  Osteomyelitis  Pubo-urethral fistula      Plan   - continue IV antibiotics  - continue indwelling fuentes catheter indefinitely  - Dr. Gaines at [facility] will coordinate further care. The patient will likely need excision of the pubic symphysis and cystectomy with urinary diversion    Ty Malhotra Jr., MD  06/07/23  07:48 EDT

## 2023-06-07 NOTE — PROGRESS NOTES
[facility] Clinical Pharmacy Services: Vancomycin Level Monitoring Note    Girish Greenberg is a 67 y.o. male who is on day 3/7 of pharmacy to dose vancomycin for Bone and/or Joint Infection.    Estimated Creatinine Clearance: 121.3 mL/min (by C-G formula based on SCr of 0.77 mg/dL).    Current Vanc Dose: 1000 mg IV every  12  hours  Results from last 7 days   Lab Units 06/07/23  0704   VANCOMYCIN TR mcg/mL 14.50   Predicted AUC at current dose:479 mg/L.hr  Next Level Date and Time: Consider checking level in 2-3 days or sooner if renal function changes.     No changes at this time. Pharmacy is continuing to monitor and will adjust as needed.    Daya Chandler, PharmD  Clinical Pharmacist

## 2023-06-07 NOTE — PROGRESS NOTES
LOS: 1 day     Chief Complaint: Pelvic osteomyelitis    Interval History: Patient doing well this morning.  Tolerating antibiotics.  Fever curve within normal limits.  No leukocytosis.    Vital Signs  Temp:  [97.4 °F (36.3 °C)-98.3 °F (36.8 °C)] 98.3 °F (36.8 °C)  Heart Rate:  [75-94] 75  Resp:  [16-18] 18  BP: (118-125)/(69-79) 123/79    Physical Exam:  General: In no acute distress  HEENT: Oropharynx clear  Cardiovascular: RRR  Respiratory: Normal work of breathing  : Sanchez catheter in place  Skin: No rashes or lesions in exposed areas  Access: Peripheral IV    Antibiotics:  Anti-Infectives (From admission, onward)      Ordered     Dose/Rate Route Frequency Start Stop    06/05/23 1533  vancomycin 1750 mg/500 mL 0.9% NS IVPB ([facility])        Ordering Provider: Garry Burch MD    20 mg/kg × 92.1 kg  over 105 Minutes Intravenous Once 06/05/23 1700 06/05/23 2003 06/05/23 1524  cefTRIAXone (ROCEPHIN) 2 g in sodium chloride 0.9 % 100 mL IVPB-VTB        Ordering Provider: Garry Burch MD    2 g  200 mL/hr over 30 Minutes Intravenous Every 24 Hours 06/05/23 1615 06/12/23 1614             Results Review:     I reviewed the patient's new clinical results.    Lab Results   Component Value Date    WBC 7.96 06/07/2023    HGB 12.2 (L) 06/07/2023    HCT 36.7 (L) 06/07/2023    MCV 85.7 06/07/2023     06/07/2023     Lab Results   Component Value Date    GLUCOSE 125 (H) 06/07/2023    BUN 18 06/07/2023    CREATININE 0.77 06/07/2023    EGFRIFNONA 68 02/11/2022    EGFRIFAFRI 109 11/29/2021    BCR 23.4 06/07/2023    CO2 22.2 06/07/2023    CALCIUM 8.7 06/07/2023    PROTENTOTREF 5.8 (L) 06/01/2022    ALBUMIN 4.0 06/05/2023    LABIL2 1.8 06/01/2022    AST 18 06/05/2023    ALT 18 06/05/2023     Microbiology:  6/5 blood cultures in process  6/5 respiratory panel negative  6/5 urine culture greater than 100,000 Klebsiella pneumoniae    Assessment    #Pubic symphyseal septic arthritis and osteomyelitis  #Bladder  fistula  #Acute UTI  #Prostate cancer status post prostatectomy  #Bladder neck contracture status post TIBNC   #Intermittent self-catheterization  #Recent COVID infection    Urine culture isolating Klebsiella and will continue ceftriaxone 2 g daily.  Stop vancomycin today.  Plans for outpatient referral to [facility] for definitive management by urology.  Plan will be for 6 weeks of IV ceftriaxone 2 g daily with an end date of 7/17.  He will need weekly CBC with differential and creatinine.  PICC line ordered today.

## 2023-06-08 ENCOUNTER — READMISSION MANAGEMENT (OUTPATIENT)
Dept: CALL CENTER | Facility: HOSPITAL | Age: 68
End: 2023-06-08
Payer: MEDICAID

## 2023-06-08 ENCOUNTER — HOME HEALTH ADMISSION (OUTPATIENT)
Dept: HOME HEALTH SERVICES | Facility: HOME HEALTHCARE | Age: 68
End: 2023-06-08
Payer: COMMERCIAL

## 2023-06-08 VITALS
SYSTOLIC BLOOD PRESSURE: 124 MMHG | DIASTOLIC BLOOD PRESSURE: 71 MMHG | HEART RATE: 64 BPM | TEMPERATURE: 97.7 F | HEIGHT: 72 IN | WEIGHT: 203.04 LBS | OXYGEN SATURATION: 96 % | RESPIRATION RATE: 16 BRPM | BODY MASS INDEX: 27.5 KG/M2

## 2023-06-08 LAB
ANION GAP SERPL CALCULATED.3IONS-SCNC: 7.5 MMOL/L (ref 5–15)
BUN SERPL-MCNC: 16 MG/DL (ref 8–23)
BUN/CREAT SERPL: 22.9 (ref 7–25)
CALCIUM SPEC-SCNC: 9.2 MG/DL (ref 8.6–10.5)
CHLORIDE SERPL-SCNC: 105 MMOL/L (ref 98–107)
CO2 SERPL-SCNC: 25.5 MMOL/L (ref 22–29)
CREAT SERPL-MCNC: 0.7 MG/DL (ref 0.76–1.27)
DEPRECATED RDW RBC AUTO: 40.8 FL (ref 37–54)
EGFRCR SERPLBLD CKD-EPI 2021: 101 ML/MIN/1.73
ERYTHROCYTE [DISTWIDTH] IN BLOOD BY AUTOMATED COUNT: 13.2 % (ref 12.3–15.4)
GLUCOSE SERPL-MCNC: 105 MG/DL (ref 65–99)
HCT VFR BLD AUTO: 35.2 % (ref 37.5–51)
HGB BLD-MCNC: 11.9 G/DL (ref 13–17.7)
MCH RBC QN AUTO: 29 PG (ref 26.6–33)
MCHC RBC AUTO-ENTMCNC: 33.8 G/DL (ref 31.5–35.7)
MCV RBC AUTO: 85.6 FL (ref 79–97)
PLATELET # BLD AUTO: 336 10*3/MM3 (ref 140–450)
PMV BLD AUTO: 8.6 FL (ref 6–12)
POTASSIUM SERPL-SCNC: 4.3 MMOL/L (ref 3.5–5.2)
RBC # BLD AUTO: 4.11 10*6/MM3 (ref 4.14–5.8)
SODIUM SERPL-SCNC: 138 MMOL/L (ref 136–145)
WBC NRBC COR # BLD: 7.64 10*3/MM3 (ref 3.4–10.8)

## 2023-06-08 PROCEDURE — 85027 COMPLETE CBC AUTOMATED: CPT | Performed by: NURSE PRACTITIONER

## 2023-06-08 PROCEDURE — 80048 BASIC METABOLIC PNL TOTAL CA: CPT | Performed by: NURSE PRACTITIONER

## 2023-06-08 PROCEDURE — 99232 SBSQ HOSP IP/OBS MODERATE 35: CPT | Performed by: STUDENT IN AN ORGANIZED HEALTH CARE EDUCATION/TRAINING PROGRAM

## 2023-06-08 PROCEDURE — 25010000002 CEFTRIAXONE PER 250 MG: Performed by: INTERNAL MEDICINE

## 2023-06-08 RX ORDER — OXYCODONE AND ACETAMINOPHEN 10; 325 MG/1; MG/1
1 TABLET ORAL EVERY 4 HOURS PRN
Qty: 18 TABLET | Refills: 0 | Status: SHIPPED | OUTPATIENT
Start: 2023-06-08 | End: 2023-06-14

## 2023-06-08 RX ORDER — BISACODYL 10 MG
10 SUPPOSITORY, RECTAL RECTAL DAILY PRN
Qty: 10 EACH | Refills: 0 | Status: SHIPPED | OUTPATIENT
Start: 2023-06-08 | End: 2023-06-18

## 2023-06-08 RX ORDER — NALOXONE HYDROCHLORIDE 4 MG/.1ML
SPRAY NASAL
Qty: 2 EACH | Refills: 0 | Status: SHIPPED | OUTPATIENT
Start: 2023-06-08

## 2023-06-08 RX ORDER — AMOXICILLIN 250 MG
2 CAPSULE ORAL 2 TIMES DAILY
Qty: 40 TABLET | Refills: 0 | Status: SHIPPED | OUTPATIENT
Start: 2023-06-08 | End: 2023-06-18

## 2023-06-08 RX ORDER — POLYETHYLENE GLYCOL 3350 17 G/17G
17 POWDER, FOR SOLUTION ORAL 2 TIMES DAILY
Qty: 1020 G | Refills: 0 | Status: SHIPPED | OUTPATIENT
Start: 2023-06-08 | End: 2023-07-08

## 2023-06-08 RX ADMIN — DOCUSATE SODIUM 50MG AND SENNOSIDES 8.6MG 2 TABLET: 8.6; 5 TABLET, FILM COATED ORAL at 10:13

## 2023-06-08 RX ADMIN — CEFTRIAXONE 2 G: 2 INJECTION, POWDER, FOR SOLUTION INTRAMUSCULAR; INTRAVENOUS at 15:04

## 2023-06-08 RX ADMIN — SODIUM CHLORIDE 75 ML/HR: 9 INJECTION, SOLUTION INTRAVENOUS at 01:29

## 2023-06-08 RX ADMIN — BUDESONIDE AND FORMOTEROL FUMARATE DIHYDRATE 2 PUFF: 160; 4.5 AEROSOL RESPIRATORY (INHALATION) at 11:39

## 2023-06-08 RX ADMIN — LEVOTHYROXINE SODIUM 50 MCG: 0.05 TABLET ORAL at 10:13

## 2023-06-08 RX ADMIN — Medication 10 ML: at 10:13

## 2023-06-08 RX ADMIN — OXYCODONE AND ACETAMINOPHEN 1 TABLET: 5; 325 TABLET ORAL at 10:18

## 2023-06-08 RX ADMIN — POLYETHYLENE GLYCOL 3350 17 G: 17 POWDER, FOR SOLUTION ORAL at 10:13

## 2023-06-08 RX ADMIN — OXYCODONE AND ACETAMINOPHEN 1 TABLET: 5; 325 TABLET ORAL at 01:29

## 2023-06-08 NOTE — PROGRESS NOTES
LOS: 2 days     Chief Complaint: Pelvic osteomyelitis    Interval History: Patient reports he is going to attempt to use the bathroom today.  States he feels as though he needs to go.  Denies any side effects with antibiotics.  Tolerated PICC line placement well.    Vital Signs  Temp:  [97.5 °F (36.4 °C)-98.2 °F (36.8 °C)] 97.7 °F (36.5 °C)  Heart Rate:  [64-79] 64  Resp:  [16-18] 16  BP: (113-130)/(63-76) 124/71    Physical Exam:  General: In no acute distress  HEENT: Oropharynx clear  Cardiovascular: RRR  Respiratory: Normal work of breathing  Skin: No rashes or lesions in exposed areas  Access: PICC line    Antibiotics:  Anti-Infectives (From admission, onward)      Ordered     Dose/Rate Route Frequency Start Stop    06/05/23 1533  vancomycin 1750 mg/500 mL 0.9% NS IVPB ([facility])        Ordering Provider: Garry Burch MD    20 mg/kg × 92.1 kg  over 105 Minutes Intravenous Once 06/05/23 1700 06/05/23 2003 06/05/23 1524  cefTRIAXone (ROCEPHIN) 2 g in sodium chloride 0.9 % 100 mL IVPB-VTB        Ordering Provider: Garry Burch MD    2 g  200 mL/hr over 30 Minutes Intravenous Every 24 Hours 06/05/23 1615 06/12/23 1614             Results Review:     I reviewed the patient's new clinical results.    Lab Results   Component Value Date    WBC 7.64 06/08/2023    HGB 11.9 (L) 06/08/2023    HCT 35.2 (L) 06/08/2023    MCV 85.6 06/08/2023     06/08/2023     Lab Results   Component Value Date    GLUCOSE 105 (H) 06/08/2023    BUN 16 06/08/2023    CREATININE 0.70 (L) 06/08/2023    EGFRIFNONA 68 02/11/2022    EGFRIFAFRI 109 11/29/2021    BCR 22.9 06/08/2023    CO2 25.5 06/08/2023    CALCIUM 9.2 06/08/2023    PROTENTOTREF 5.8 (L) 06/01/2022    ALBUMIN 4.0 06/05/2023    LABIL2 1.8 06/01/2022    AST 18 06/05/2023    ALT 18 06/05/2023     Microbiology:  6/5 blood cultures in process  6/5 respiratory panel negative  6/5 urine culture greater than 100,000 Klebsiella pneumoniae    Assessment    #Pubic symphyseal  septic arthritis and osteomyelitis  #Bladder fistula  #Acute UTI  #Prostate cancer status post prostatectomy  #Bladder neck contracture status post TIBNC   #Intermittent self-catheterization  #Recent COVID infection    Plan for 6 weeks ceftriaxone 2 g daily for pubic symphysis osteomyelitis and septic arthritis.  End date will be 7/17 and will need weekly CBC with differential and creatinine while on therapy faxed to [phone].  Follow-up to be scheduled with me in clinic at the end of the 6 weeks of therapy.    Thank you for allowing me to be involved in the care of this patient. Infectious diseases will sign off at this time with antibiotics plan in place, but please call me at [phone] if any further ID questions or new ID concerns.

## 2023-06-08 NOTE — PLAN OF CARE
Goal Outcome Evaluation:   Pt AxOx4, calm and cooperative. VSS. .  Reg diet for dinner, Pt takes meds whole with water, see MAR. IV fluids at 75 ml/hr. IV Rocephin given. PRN morphine given for pain control, with good effect. Urinal at bedside. RN will continue to monitor.

## 2023-06-08 NOTE — CASE MANAGEMENT/SOCIAL WORK
Discharge Planning Assessment  [facility]     Patient Name: Girish Greenberg  MRN: [id number]  Today's Date: 6/8/2023    Admit Date: 6/5/2023    Plan: Home with HH and Home Infusion- referrals pending   Discharge Needs Assessment       Row Name 06/08/23 0957       Living Environment    People in Home spouse;child(hannah), adult;grandchild(hannah)    Name(s) of People in Home Spouse, Son, Daughter-in-law, and grandchildren    Current Living Arrangements home    Potentially Unsafe Housing Conditions none    Primary Care Provided by self    Provides Primary Care For no one    Family Caregiver if Needed spouse    Family Caregiver Names Jade Garzon [phone]    Quality of Family Relationships helpful;involved;supportive    Able to Return to Prior Arrangements yes       Resource/Environmental Concerns    Resource/Environmental Concerns none       Transition Planning    Patient/Family Anticipates Transition to home with family    Patient/Family Anticipated Services at Transition none    Transportation Anticipated family or friend will provide       Discharge Needs Assessment    Equipment Currently Used at Home none    Concerns to be Addressed discharge planning    Equipment Needed After Discharge none    Outpatient/Agency/Support Group Needs infusion therapy, home;homecare agency    Discharge Facility/Level of Care Needs home with home health                   Discharge Plan       Row Name 06/08/23 0959       Plan    Plan Home with HH and Home Infusion- referrals pending    Patient/Family in Agreement with Plan yes    Plan Comments CCP spoke with patient and patient's spouse, Jade, via phone; CCP role explained, face sheet verified, and discharge plan discussed. Patient resides with spouse, son, daughter-in-law, and grandchildren in three-level home with 7-8 steps to enter. Patient denies use of any DME and reports being independent with ADLs. Confirms PCP is Regina Landis. Denies any HH and SNF history. Patient requires  six weeks IV antibiotics with an end date of 7/17. Patient denies HH/Infusion company preference. Referrals pending to [facility] and [facility]. Patient states family will transport at discharge. CCP to follow. Dev COOK LCSW                  Continued Care and Services - Admitted Since 6/5/2023    Coordination has not been started for this encounter.       Expected Discharge Date and Time       Expected Discharge Date Expected Discharge Time    Jun 8, 2023            Demographic Summary       Row Name 06/08/23 0957       General Information    Admission Type inpatient    Arrived From home    Reason for Consult discharge planning    Preferred Language English                   Functional Status       Row Name 06/08/23 0957       Functional Status    Usual Activity Tolerance good    Current Activity Tolerance good       Functional Status, IADL    Medications independent    Meal Preparation independent    Housekeeping independent    Laundry independent    Shopping independent                   Psychosocial    No documentation.                  Abuse/Neglect    No documentation.                  Legal    No documentation.                  Substance Abuse    No documentation.                  Patient Forms    No documentation.                     Dev Lozano

## 2023-06-08 NOTE — PROGRESS NOTES
Case Management Discharge Note      Final Note: Discharged to [facility] and [facility]. Spouse to provide the transportation to home today. DWAYNE Trivedi RN, CCP.         Selected Continued Care - Admitted Since 6/5/2023       Destination    No services have been selected for the patient.                Durable Medical Equipment    No services have been selected for the patient.                Dialysis/Infusion Coordination complete.      Service Provider Selected Services Address Phone Fax Patient Preferred    [facility] Infusion and IV Therapy [address] [place] [address] [phone] --              Home Medical Care Coordination complete.      Service Provider Selected Services Address Phone Fax Patient Preferred     [facility] ,  Home Rehabilitation ,  Home Nursing [address] [address] [phone] --              Therapy    No services have been selected for the patient.                Community Resources    No services have been selected for the patient.                Community & DME    No services have been selected for the patient.                         Final Discharge Disposition Code: 06 - home with home health care

## 2023-06-08 NOTE — DISCHARGE PLACEMENT REQUEST
"Girish Greenberg (67 y.o. Male)       Date of Birth   1955    Social Security Number       Address   [address] [address]    Home Phone   [phone]    MRN   [id number]       Zoroastrian   None    Marital Status                               Admission Date   6/5/23    Admission Type   Emergency    Admitting Provider   Garry Burch MD    Attending Provider   Garry Burch MD    Department, Room/Bed   [address], 84/1       Discharge Date       Discharge Disposition   [place] [facility] [place] Destination                                 Attending Provider: Garry Burch MD    Allergies: Penicillins    Isolation: Enh Drop/Con   Infection: COVID (confirmed) (06/05/23)   Code Status: CPR    Ht: 182.9 cm (72\")   Wt: 92.1 kg (203 lb 0.7 oz)    Admission Cmt: None   Principal Problem: Abnormal CT of the abdomen [R93.5]                   Active Insurance as of 6/5/2023       Primary Coverage       Payor Plan Insurance Group Employer/Plan Group    [facility] [id number]       Payor Plan Address Payor Plan Phone Number Payor Plan Fax Number Effective Dates    [address]   1/1/2023 - None Entered    [facility] [id number]         Subscriber Name Subscriber Birth Date Member ID       GIRISH GREENBERG 1955 [id number]               Secondary Coverage       Payor Plan Insurance Group Employer/Plan Group    MEDICARE MEDICARE A & B        Payor Plan Address Payor Plan Phone Number Payor Plan Fax Number Effective Dates    [address] [phone]  9/1/2020 - None Entered    [facility] [address]         Subscriber Name Subscriber Birth Date Member ID       GIRISH GREENBERG 1955 [id number]                     Emergency Contacts        (Rel.) Home Phone Work Phone Mobile Phone    Jade Greenberg (Spouse) [phone] -- --                "

## 2023-06-08 NOTE — PROGRESS NOTES
[facility] will follow post hospital as requested. Patient/spouse agreeable to services. Contact information and PCP confirmed. Best to call patient's mobile # [phone] to schedule visits.

## 2023-06-08 NOTE — PROGRESS NOTES
Discharge Planning Assessment  [facility]     Patient Name: Girish Greenberg  MRN: [id number]  Today's Date: 6/8/2023    Admit Date: 6/5/2023    Plan: Home with HH and Home Infusion- referrals pending   Discharge Needs Assessment       Row Name 06/08/23 0957       Living Environment    People in Home spouse;child(hannah), adult;grandchild(hannah)    Name(s) of People in Home Spouse, Son, Daughter-in-law, and grandchildren    Current Living Arrangements home    Potentially Unsafe Housing Conditions none    Primary Care Provided by self    Provides Primary Care For no one    Family Caregiver if Needed spouse    Family Caregiver Names Jade Garzon [phone]    Quality of Family Relationships helpful;involved;supportive    Able to Return to Prior Arrangements yes       Resource/Environmental Concerns    Resource/Environmental Concerns none       Transition Planning    Patient/Family Anticipates Transition to home with family    Patient/Family Anticipated Services at Transition none    Transportation Anticipated family or friend will provide       Discharge Needs Assessment    Equipment Currently Used at Home none    Concerns to be Addressed discharge planning    Equipment Needed After Discharge none    Outpatient/Agency/Support Group Needs infusion therapy, home;homecare agency    Discharge Facility/Level of Care Needs home with home health                   Discharge Plan       Row Name 06/08/23 7053       Plan    Plan Comments Sent referral to [facility]. I have not heard from [facility] infusion at this time. DWAYNE Trivedi RN CCP.      Row Name 06/08/23 0959       Plan    Plan Home with HH and Home Infusion- referrals pending    Patient/Family in Agreement with Plan yes    Plan Comments CCP spoke with patient and patient's spouse, Jade, via phone; CCP role explained, face sheet verified, and discharge plan discussed. Patient resides with spouse, son, daughter-in-law, and grandchildren in three-level home with 7-8 steps  to enter. Patient denies use of any DME and reports being independent with ADLs. Confirms PCP is Regina Landis. Denies any HH and SNF history. Patient requires six weeks IV antibiotics with an end date of 7/17. Patient denies HH/Infusion company preference. Referrals pending to [facility] and [facility]. Patient states family will transport at discharge. CCP to follow. Dev COOK LCSW                  Continued Care and Services - Admitted Since 6/5/2023       Dialysis/Infusion       Service Provider Request Status Selected Services Address Phone Fax Patient Preferred    [facility] Pending - Request Sent N/A [address], [place] [id number] [phone] --    [facility] Pending - Request Sent N/A [address] [facility] [address] [address] [phone] --              Home Medical Care       Service Provider Request Status Selected Services Address Phone Fax Patient Preferred    [facility] Pending - Request Sent N/A [address] [facility] [address] [id number] [phone] --    [facility] Pending - Request Sent N/A [address], [facility] [address] [phone] --    [facility] Pending - Request Sent N/A [address] [facility] [phone] [phone] --    [facility] Declined  Cannot meet patient's needs N/A [address] [address]  [facility] [address], [facility] [address] [phone] --    [facility] Declined  Inadequate staffing N/A [address] [facility] [id number] [phone] --                  Expected Discharge Date and Time       Expected Discharge Date Expected Discharge Time    Jun 8, 2023            Demographic Summary       Row Name 06/08/23 0957       General Information    Admission Type inpatient    Arrived  From home    Reason for Consult discharge planning    Preferred Language English                   Functional Status       Row Name 06/08/23 0957       Functional Status    Usual Activity Tolerance good    Current Activity Tolerance good       Functional Status, IADL    Medications independent    Meal Preparation independent    Housekeeping independent    Laundry independent    Shopping independent                   Psychosocial    No documentation.                  Abuse/Neglect    No documentation.                  Legal    No documentation.                  Substance Abuse    No documentation.                  Patient Forms    No documentation.                     Rhiannon Trivedi RN

## 2023-06-08 NOTE — DISCHARGE PLACEMENT REQUEST
"Girish Greenberg (67 y.o. Male)       Date of Birth   1955    Social Security Number       Address   [address] [address]    Home Phone   [phone]    MRN   [id number]       [facility]   None    Marital Status                               Admission Date   6/5/23    Admission Type   Emergency    Admitting Provider   Garry Burch MD    Attending Provider   Garry Burch MD    Department, Room/Bed   [address], [address]       Discharge Date       [place] Dis[place] [facility] [place] Destination                                 Attending Provider: Garry Burch MD    Allergies: Penicillins    Isolation: Enh Drop/Con   Infection: COVID (confirmed) (06/05/23)   Code Status: CPR    Ht: 182.9 cm (72\")   Wt: 92.1 kg (203 lb 0.7 oz)    Admission Cmt: None   Principal Problem: Abnormal CT of the abdomen [R93.5]                   Active Insurance as of 6/5/2023       Primary Coverage       Payor Plan Insurance Group Employer/Plan Group    [facility] [id number]       Payor Plan Address Payor Plan Phone Number Payor Plan Fax Number Effective Dates    [address]   1/1/2023 - None Entered    [facility] [address]         Subscriber Name Subscriber Birth Date Member ID       GIRISH GREENBERG 1955 [id number]               Secondary Coverage       Payor Plan Insurance Group Employer/Plan Group    MEDICARE MEDICARE A & B        Payor Plan Address Payor Plan Phone Number Payor Plan Fax Number Effective Dates    [address] [phone]  9/1/2020 - None Entered    [facility] [address]         Subscriber Name Subscriber Birth Date Member ID       GIRISH GREENBERG 1955 [id number]                     Emergency Contacts        (Rel.) Home Phone Work Phone Mobile Phone    Jade Greenberg (Spouse) [phone] -- --                "

## 2023-06-08 NOTE — DISCHARGE SUMMARY
Patient Name: Girish Greenberg  : 1955  MRN: [id number]    Date of Admission: 2023  Date of Discharge:  2023  Primary Care Physician: Regina Landis MD      Chief Complaint:   Abdominal Pain      Discharge Diagnoses     Active Hospital Problems    Diagnosis  POA    **Abnormal CT of the abdomen [R93.5]  Yes    Osteomyelitis, pelvic [M86.9]  Yes    Rash of back [R21]  Unknown    History of prostate cancer [Z85.46]  Not Applicable    Abdominal pain, RLQ [R10.9]  Yes    Hypothyroidism (acquired) [E03.9]  Yes      Resolved Hospital Problems   No resolved problems to display.        Hospital Course     Mr. Greenberg is a 67 y.o. 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    Infectious disease recommend prolonged antibiotic course ceftriaxone 2 g daily with end date on 2023, weekly CBC with differential, 6 weekly creatinine faxed to infectious disease clinic with follow-up after antibiotic completion.  PICC line placed prior to hospital discharge.    Urology recommend continue Sanchez catheter indefinitely and follow-up with Dr. Vijay Gaines at [facility] for further management of above and potential excision of pubic symphysis cystectomy with urinary diversion.    Reported COVID-19 positive prior to hospital arrival.  Despite PCR negative infection control recommend continue enhanced isolation precautions until 2023.    Bowel movement prior to hospital discharge.  Patient tolerating minimal physical activity with oxycodone as needed.  Recommend continue bowel regimen as well follow-up primary care provider for continued pain medication management.    Patient appears medically stable for discharge home with family and home health on 2023 pending home health infusion  referrals.    Day of Discharge     Physical Exam:  Temp:  [97.5 °F (36.4 °C)-97.7 °F (36.5 °C)] 97.7 °F (36.5 °C)  Heart Rate:  [64-72] 64  Resp:  [16] 16  BP: (113-130)/(63-71) 124/71  Body mass index is 27.54 kg/m².    Physical Exam  Constitutional:       General: He is not in acute distress.     Appearance: He is not toxic-appearing.   HENT:      Head: Normocephalic and atraumatic.   Eyes:      Extraocular Movements: Extraocular movements intact.      Conjunctiva/sclera: Conjunctivae normal.   Cardiovascular:      Rate and Rhythm: Normal rate.      Heart sounds: Normal heart sounds.   Pulmonary:      Effort: Pulmonary effort is normal.      Breath sounds: Normal breath sounds.   Abdominal:      General: Bowel sounds are normal.      Palpations: Abdomen is soft.   Musculoskeletal:         General: Tenderness (right groin) present.      Cervical back: Normal range of motion and neck supple.      Right lower leg: No edema.      Left lower leg: No edema.   Skin:     General: Skin is warm and dry.   Neurological:      Mental Status: He is alert and oriented to person, place, and time.      Cranial Nerves: No cranial nerve deficit.   Psychiatric:         Behavior: Behavior normal.         Thought Content: Thought content normal.       Consultants     Consult Orders (all) (From admission, onward)       Start     Ordered    06/07/23 1245  IV TEAM - Consult for PICC Line (IV Team to Determine Number of Lumens)  Once        Provider:  (Not yet assigned)    06/07/23 1244    06/05/23 1528  Inpatient Neurosurgery Consult  Once,   Status:  Canceled        Specialty:  Neurosurgery  Provider:  Miah Bourgeois MD    06/05/23 1531    06/05/23 1519  Inpatient Orthopedic Surgery Consult  Once,   Status:  Canceled        Specialty:  Orthopedic Surgery  Provider:  (Not yet assigned)    06/05/23 1519    06/05/23 1519  Inpatient Infectious Diseases Consult  Once        Specialty:  Infectious Diseases  Provider:  Serafin Zambrano  MD Matteo    06/05/23 1519    06/05/23 0615  Inpatient Urology Consult  Once        Specialty:  Urology  Provider:  Ricky Billings MD    06/05/23 0616    06/05/23 0600  [facility] (on-call MD unless specified) Details  Once        Specialty:  Hospitalist  Provider:  (Not yet assigned)    06/05/23 0559                  Procedures     CYSTOSCOPY, URETERAL DILATION CATHETER INSERTION      Imaging Results (All)       Procedure Component Value Units Date/Time    MRI Pelvis With & Without Contrast [[id number]] Collected: 06/05/23 1516     Updated: 06/06/23 0914    Narrative:      MRI PELVIS WITH AND WITHOUT CONTRAST     HISTORY: 67-year-old male with history of a pelvic fracture in October  with pelvic pain since that time. Now with right lower quadrant pain.     TECHNIQUE: MRI pelvis includes axial T1 fat-sat, T2 fat-sat, as well as  coronal T1, STIR sequences. Gadolinium was administered intravenously  followed by axial and coronal T1 fat-saturated sequences.     COMPARISON: CT abdomen and pelvis 06/05/2023.     FINDINGS: There is abnormal widening of the pubic symphyseal joint and  there is a pubic symphyseal joint effusion. There are erosive changes  within the left and right pubic body and there is surrounding bone  marrow and soft tissue edema. Soft tissue edema extends into the hip  adductor pectineus musculature. There is also surrounding soft tissue  enhancement with fluid signal that communicates with the bladder and  extends into the posterior and superior aspect of the pubic symphyseal  joint with surrounding soft tissue thickening. There is also a T2 low  signal structure surrounded by fluid along the posterior-superior margin  of the pubic symphyseal joint and this low signal structure is  consistent with calcification that measures 1.6 x 0.6 x 1.8 cm AP and is  along the anterior aspect of the bladder base. This is suspicious for a  pubic symphyseal bone fragment that has migrated into the urinary  bladder or  surrounded by fluid that communicates with the urinary  bladder. There is also abnormal soft tissue edema and enhancement within  the distal right rectus abdominis muscle. This contains a thin  nonenhancing focus of fluid signal that measures approximately 7 x 5 mm,  consistent with a microabscess. There is surrounding myositis. No  evidence for drainage to the skin surface.     There is no hip fracture or osteonecrosis. Periarticular musculature  appears normal. There is no evidence for septic arthritis at either  sacroiliac joint. There is sclerosis within both sacral alae, consistent  with nonacute bilateral sacral alar fractures that appear to be healing.       Impression:      1. 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 There is fluid signal within the posterior-  superior pubic symphyseal joint that appears to communicate with the  bladder lumen and there is surrounding inflammatory change with bladder  wall thickening. There is also a low signal structure surrounded by  fluid along the posterior margin of the pubic symphyseal joint that  appears to represent a calcification or bone fragment within this  collection that communicates with the urinary bladder lumen.  2. Healing bilateral sacral alar fractures.     This report was finalized on 6/6/2023 9:11 AM by Dr. Julián Aragon M.D.       CT Abdomen Pelvis With Contrast [[id number]] Collected: 06/05/23 0554     Updated: 06/05/23 0554    Narrative:        Patient: PAO COLEMAN  Time Out: 05:53  Exam(s): CT ABDOMEN + PELVIS With Contrast     EXAM:    CT Abdomen and Pelvis With Intravenous Contrast    CLINICAL HISTORY:     Reason for exam: RLQ pain.    TECHNIQUE:    Axial computed tomography images of the abdomen and pelvis with   intravenous contrast.  CTDI is 13.27 mGy and DLP is 735.3 mGy-cm.  This   CT exam was performed according to the  principle of ALARA (As Low As   Reasonably Achievable) by using one or more of the following dose   reduction techniques: automated exposure control, adjustment of the mA   and or kV according to patient size, and or use of iterative   reconstruction technique.    COMPARISON:    No relevant prior studies available.    FINDINGS:    Lung bases:  Unremarkable.  No mass.  No consolidation.     ABDOMEN:    Liver: Multiple cysts in the liver measuring up to 2.7 cm.  No mass.    Gallbladder and bile ducts:  Unremarkable.  No calcified stones.  No   ductal dilation.    Pancreas:  Unremarkable.  No mass.  No ductal dilation.    Spleen:  Unremarkable.  No splenomegaly.    Adrenals:  Unremarkable.  No mass.    Kidneys and ureters:  Unremarkable.  No solid mass.  No hydronephrosis.    Stomach and bowel:  Unremarkable.  No obstruction.  No mucosal   thickening.     PELVIS:    Appendix:  No findings to suggest acute appendicitis.    Bladder:  Multiple calcifications in the bladder base measuring up to   15 mm.  Bladder wall thickening along the bladder base with anterior   bladder base mural defect and fluid extending into the pubic symphysis   with erosive changes of the bilateral medial pubic bones.      Reproductive:  Status post prostatectomy.      ABDOMEN and PELVIS:    Intraperitoneal space:  Unremarkable.  No free air.  No significant   fluid collection.    Bones joints:  No acute fracture.  No dislocation.    Soft tissues:  Unremarkable.    Vasculature:  Unremarkable.  No abdominal aortic aneurysm.    Lymph nodes:  Unremarkable.  No enlarged lymph nodes.    IMPRESSION:       Status post prostatectomy. Irregular soft tissue thickening of the   bladder base with mural defect along the anterior bladder wall extending   to the pubic symphysis and associated erosive changes of the bilateral   medial pubic bones which may be secondary to malignancy or contained   perforation with chronic osteomyelitis of the pubic symphysis.  Cystoscopy   or MRI Pelvis may be obtained for further evaluation.      Impression:          Electronically signed by Cr Don M.D. on 06-05-23 at 0553              Pertinent Labs     Results from last 7 days   Lab Units 06/08/23  0622 06/07/23  0704 06/06/23  0812 06/05/23  0320   WBC 10*3/mm3 7.64 7.96 7.49 8.70   HEMOGLOBIN g/dL 11.9* 12.2* 12.3* 13.2   PLATELETS 10*3/mm3 336 333 330 359     Results from last 7 days   Lab Units 06/08/23  0622 06/07/23  0704 06/06/23  0812 06/05/23  0320   SODIUM mmol/L 138 136 139 138   POTASSIUM mmol/L 4.3 4.2 3.9 4.8   CHLORIDE mmol/L 105 103 104 103   CO2 mmol/L 25.5 22.2 25.0 25.1   BUN mg/dL 16 18 14 33*   CREATININE mg/dL 0.70* 0.77 0.69* 0.91   GLUCOSE mg/dL 105* 125* 111* 124*   EGFR mL/min/1.73 101.0 98.1 101.4 92.4     Results from last 7 days   Lab Units 06/05/23  0320   ALBUMIN g/dL 4.0   BILIRUBIN mg/dL 0.3   ALK PHOS U/L 39   AST (SGOT) U/L 18   ALT (SGPT) U/L 18     Results from last 7 days   Lab Units 06/08/23  0622 06/07/23  0704 06/06/23  0812 06/05/23  0320   CALCIUM mg/dL 9.2 8.7 9.4 10.5   ALBUMIN g/dL  --   --   --  4.0     Results from last 7 days   Lab Units 06/05/23  0320   LIPASE U/L 31             Invalid input(s): LDLCALC  Results from last 7 days   Lab Units 06/05/23  1115 06/05/23  0928 06/05/23  0627   BLOODCX   --  No growth at 3 days No growth at 3 days   URINECX  >100,000 CFU/mL Klebsiella pneumoniae ssp pneumoniae*  --   --      Results from last 7 days   Lab Units 06/05/23  0809   COVID19  Not Detected       Test Results Pending at Discharge     Pending Labs       Order Current Status    Blood Culture - Blood, Arm, Left Preliminary result    Blood Culture - Blood, Hand, Right Preliminary result            Discharge Details        Discharge Medications        New Medications        Instructions Start Date   bisacodyl 10 MG suppository  Commonly known as: DULCOLAX   10 mg, Rectal, Daily PRN      cefTRIAXone 2 g in sodium chloride 0.9 % 100  mL IVPB   2 g, Intravenous, Every 24 Hours      naloxone 4 MG/0.1ML nasal spray  Commonly known as: NARCAN   Call 911. Don't prime. [place] in 1 nostril for overdose. Repeat in 2-3 minutes in other nostril if no or minimal breathing/responsiveness.      oxyCODONE-acetaminophen  MG per tablet  Commonly known as: Percocet  Replaces: oxyCODONE-acetaminophen 5-325 MG per tablet   1 tablet, Oral, Every 4 Hours PRN      polyethylene glycol 17 GM/SCOOP powder  Commonly known as: MIRALAX   Dissolve 17 g (1 capful) in liquid and drink by mouth 2 (Two) Times a Day for 30 days.      sennosides-docusate 8.6-50 MG per tablet  Commonly known as: PERICOLACE   2 tablets, Oral, 2 Times Daily, Hold for diarrhea             Continue These Medications        Instructions Start Date   albuterol sulfate  (90 Base) MCG/ACT inhaler  Commonly known as: PROVENTIL HFA;VENTOLIN HFA;PROAIR HFA   2 puffs, Inhalation, Every 6 Hours PRN      b complex-C-E-zinc tablet   1 tablet, Oral, Daily      B-12 PO   1 capsule, Oral, Daily, HOLDING FOR OR      BLACK ELDERBERRY PO   1 tablet, Oral, Nightly, HOLDING FOR OR      budesonide-formoterol 160-4.5 MCG/ACT inhaler  Commonly known as: SYMBICORT   2 puffs, Inhalation, As Needed      CO Q 10 PO   1 capsule, Oral, Daily, HOLD FOR SURGERY      DULoxetine 60 MG capsule  Commonly known as: CYMBALTA   TAKE 1 CAPSULE BY MOUTH EVERY DAY      GREEN TEA PO   1 tablet, Oral, Daily, HOLD FOR SURGERY      Hydrocort-Pramoxine (Perianal) 2.5-1 % rectal cream  Commonly known as: ANALPRAM-HC   Rectal, 3 Times Daily      levothyroxine 50 MCG tablet  Commonly known as: SYNTHROID, LEVOTHROID   TAKE 1 TABLET BY MOUTH EVERY DAY      magnesium oxide 400 tablet tablet  Commonly known as: MAG-OX   400 mg, Oral, Daily      RED YEAST RICE PO   1 capsule, Oral, Daily, HOLD FOR SURGERY      TURMERIC CURCUMIN PO   1 capsule, Oral, Daily, HOLD FOR SURGERY      VITAMIN C PO   1 tablet, Oral, Daily, HOLDING FOR OR     "  VITAMIN D PO   1 capsule, Oral, Daily, HOLDING FOR OR             Stop These Medications      cephalexin 500 MG capsule  Commonly known as: Keflex     oxyCODONE-acetaminophen 5-325 MG per tablet  Commonly known as: Percocet  Replaced by: oxyCODONE-acetaminophen  MG per tablet              Allergies   Allergen Reactions    Penicillins Other (See Comments)     \"BLACKS OUT\"/ IN CHILDHOOD       Discharge Disposition:  Home-Health Care Svc      Discharge Diet:  Diet Order   Procedures    Diet: Regular/House Diet; Texture: Regular Texture (IDDSI 7); Fluid Consistency: Thin (IDDSI 0)       Discharge Activity:   Activity Instructions       Activity as Tolerated              CODE STATUS:    Code Status and Medical Interventions:   Ordered at: 06/05/23 0933     Code Status (Patient has no pulse and is not breathing):    CPR (Attempt to Resuscitate)     Medical Interventions (Patient has pulse or is breathing):    Full Support       Future Appointments   Date Time Provider Department Center   6/20/2023  9:00 AM [facility]   6/23/2023 10:30 AM Regina Omalley MD [facility]   7/17/2023 10:50 AM Kevin Aquino DO [facility]   1/2/2024  9:00 AM [facility]   1/5/2024 10:45 AM Regina Omalley MD [facility]     Additional Instructions for the Follow-ups that You Need to Schedule       Ambulatory Referral to Home Health   As directed      Face to Face Visit Date: 6/8/2023    Follow-up provider for Plan of Care?: I treated the patient in an acute care facility and will not continue treatment after discharge.    Follow-up provider: REGINA OMALLEY [[id number]]    Reason/Clinical Findings: Pubic symphysis osteomyelitis and septic arthritis    Describe mobility limitations that make leaving home difficult: Pubic symphysis osteomyelitis and septic arthritis    Nursing/Therapeutic Services Requested: Skilled Nursing    Skilled nursing orders: Infusion therapy " PICC line care/instruction    Frequency: 1 Week 1         Ambulatory Referral to Home Health   As directed      Weekly CBC with differential and creatinine faxed to [phone] (infectious disease--Dr. Kevin Aquino)    Order Comments: Weekly CBC with differential and creatinine faxed to [phone] (infectious disease--Dr. Kevin Aquino)     Face to Face Visit Date: 6/8/2023    Follow-up provider for Plan of Care?: I treated the patient in an acute care facility and will not continue treatment after discharge.    Follow-up provider: JOHNATHAN OMALLEY [[id number]]    Reason/Clinical Findings: Pubic symphysis osteomyelitis and septic arthritis    Describe mobility limitations that make leaving home difficult: Pubic symphysis osteomyelitis and septic arthritis    Nursing/Therapeutic Services Requested: Skilled Nursing    Skilled nursing orders: Other    Frequency: 1 Week 1         Discharge Follow-up with PCP   As directed       Currently Documented PCP:    Johnathan Omalley MD    PCP Phone Number:    [phone]     Follow Up Details: Please call to schedule a 1 week or earliest available follow-up appointment PCP; continue pain management                Follow-up Information       Johnathan Omalley MD .    Specialty: Internal Medicine  Why: Please call to schedule a 1 week or earliest available follow-up appointment PCP; continue pain management  Contact information:  [address]  [address]  [place] [address] [phone]               Vijay Gaines MD. Schedule an appointment as soon as possible for a visit.    Specialty: Urology  Contact information:  [address]  [address]  [place] [address] [phone]               Kevin Aquino DO. Schedule an appointment as soon as possible for a visit.    Specialty: Infectious Diseases  Why: Please call schedule 6-week follow-up appointment with Dr. Kevin Aquino as previously discussed  Contact information:  [address]  [address]  [facility]  [address]  [phone]                             Additional Instructions for the Follow-ups that You Need to Schedule       Ambulatory Referral to Home Health   As directed      Face to Face Visit Date: 6/8/2023    Follow-up provider for Plan of Care?: I treated the patient in an acute care facility and will not continue treatment after discharge.    Follow-up provider: REGINA OMALLEY [[id number]]    Reason/Clinical Findings: Pubic symphysis osteomyelitis and septic arthritis    Describe mobility limitations that make leaving home difficult: Pubic symphysis osteomyelitis and septic arthritis    Nursing/Therapeutic Services Requested: Skilled Nursing    Skilled nursing orders: Infusion therapy PICC line care/instruction    Frequency: 1 Week 1         Ambulatory Referral to Home Health   As directed      Weekly CBC with differential and creatinine faxed to [phone] (infectious disease--Dr. Kevin Aquino)    Order Comments: Weekly CBC with differential and creatinine faxed to [phone] (infectious disease--Dr. Kevin Aquino)     Face to Face Visit Date: 6/8/2023    Follow-up provider for Plan of Care?: I treated the patient in an acute care facility and will not continue treatment after discharge.    Follow-up provider: REGINA OMALLEY [[id number]]    Reason/Clinical Findings: Pubic symphysis osteomyelitis and septic arthritis    Describe mobility limitations that make leaving home difficult: Pubic symphysis osteomyelitis and septic arthritis    Nursing/Therapeutic Services Requested: Skilled Nursing    Skilled nursing orders: Other    Frequency: 1 Week 1         Discharge Follow-up with PCP   As directed       Currently Documented PCP:    Regina Omalley MD    PCP Phone Number:    [phone]     Follow Up Details: Please call to schedule a 1 week or earliest available follow-up appointment PCP; continue pain management             Time Spent on Discharge:  Greater than 30 minutes      DIONE Parra  [facility]  06/08/23  11:01 EDT

## 2023-06-09 ENCOUNTER — HOME CARE VISIT (OUTPATIENT)
Dept: HOME HEALTH SERVICES | Facility: HOME HEALTHCARE | Age: 68
End: 2023-06-09
Payer: COMMERCIAL

## 2023-06-09 ENCOUNTER — TELEPHONE (OUTPATIENT)
Dept: INTERNAL MEDICINE | Facility: CLINIC | Age: 68
End: 2023-06-09
Payer: MEDICAID

## 2023-06-09 ENCOUNTER — TRANSITIONAL CARE MANAGEMENT TELEPHONE ENCOUNTER (OUTPATIENT)
Dept: CALL CENTER | Facility: HOSPITAL | Age: 68
End: 2023-06-09
Payer: MEDICAID

## 2023-06-09 PROCEDURE — G0299 HHS/HOSPICE OF RN EA 15 MIN: HCPCS

## 2023-06-09 NOTE — OUTREACH NOTE
Call Center TCM Note      Flowsheet Row Responses   [facility] patient discharged from? [facility]   Does the patient have one of the following disease processes/diagnoses(primary or secondary)? Other   TCM attempt successful? Yes   Call start time 1308   Call end time 1314   Discharge diagnosis pubic symphysis osteomyelitis and septic arthritis and urine culture growing Klebsiella.   Person spoke with today (if not patient) and relationship Patient   Medication alerts for this patient Home IV antibiotics   Meds reviewed with patient/caregiver? Yes   Does the patient have all medications ordered at discharge? Yes   Is the patient taking all medications as directed (includes completed medication regime)? Yes   Medication comments Patient understands to take probiotic daily   Comments PCP Dr Landis. Patient does not have a hospital follow up in place. He has a previously scheduled office visit for 6/23/23 1030am. He declines to schedule earlier appt with call today. He will discuss with wife and contact office back.   Does the patient have an appointment with their PCP within 7 days of discharge? No   What is the Home health agency?  [facility].   Has home health visited the patient within 72 hours of discharge? Call prior to 72 hours  [Scheduled to come this afternoon]   Psychosocial issues? No   Did the patient receive a copy of their discharge instructions? Yes   Nursing interventions Reviewed instructions with patient   What is the patient's perception of their health status since discharge? Improving   Is the patient/caregiver able to teach back signs and symptoms related to disease process for when to call PCP? Yes   Is the patient/caregiver able to teach back the hierarchy of who to call/visit for symptoms/problems? PCP, Specialist, Home health nurse, Urgent Care, ED, 911 Yes   If the patient is a current smoker, are they able to teach back resources for cessation? Not a  smoker   TCM call completed? Yes   Wrap up additional comments Patient denies any needs today from primary care. Has infectious disease and urology follow up.   Call end time 1314   Would this patient benefit from a Referral to [facility]? No   Is the patient interested in additional calls from an ambulatory ?  NOTE:  applies to high risk patients requiring additional follow-up. No            Corrina Celaya RN    6/9/2023, 13:15 EDT

## 2023-06-09 NOTE — TELEPHONE ENCOUNTER
----- Message from Regina Landis MD sent at 6/9/2023  8:07 AM EDT -----  Patient w/ prolonged antibiotic course. Please call and advise to take a daily probiotic to protect normal gut penelope.   LILIAN

## 2023-06-09 NOTE — OUTREACH NOTE
Prep Survey      Flowsheet Row Responses   [facility] patient discharged from? [facility]   Is LACE score < 7 ? No   Eligibility [facility]   Date of Admission 06/05/23   Date of Discharge 06/08/23   Discharge Disposition Home-Health Care [facility]   Discharge diagnosis *Abnormal CT of the abdomen Pelvic osteomylitis   Does the patient have one of the following disease processes/diagnoses(primary or secondary)? Other   Does the patient have Home health ordered? Yes   What is the Home health agency?  [facility]   Is there a DME ordered? No   Prep survey completed? Yes            [place] - Registered Nurse

## 2023-06-09 NOTE — Clinical Note
Dear Dr Boby Greenberg   55    The above named patient was admitted into home health services as of 23.  We will be doing nursing visits weekly for IV therapy teaching, PICC line dressing changes, weekly labs, assessment of all systems, and medication teaching.  If you have any other needs or any questions, please feel free to contact our agency at [phone].     Thank you so much,     Tamiko CHENN RN   [facility]  [phone]

## 2023-06-10 LAB
BACTERIA SPEC AEROBE CULT: NORMAL
BACTERIA SPEC AEROBE CULT: NORMAL

## 2023-06-11 VITALS
TEMPERATURE: 97.8 F | HEART RATE: 87 BPM | SYSTOLIC BLOOD PRESSURE: 138 MMHG | OXYGEN SATURATION: 94 % | RESPIRATION RATE: 18 BRPM | DIASTOLIC BLOOD PRESSURE: 78 MMHG

## 2023-06-12 ENCOUNTER — LAB REQUISITION (OUTPATIENT)
Dept: LAB | Facility: HOSPITAL | Age: 68
End: 2023-06-12
Payer: COMMERCIAL

## 2023-06-12 ENCOUNTER — HOME CARE VISIT (OUTPATIENT)
Dept: HOME HEALTH SERVICES | Facility: HOME HEALTHCARE | Age: 68
End: 2023-06-12
Payer: COMMERCIAL

## 2023-06-12 DIAGNOSIS — M46.28 OSTEOMYELITIS OF VERTEBRA, SACRAL AND SACROCOCCYGEAL REGION: ICD-10-CM

## 2023-06-12 LAB
BASOPHILS # BLD AUTO: 0.07 10*3/MM3 (ref 0–0.2)
BASOPHILS NFR BLD AUTO: 0.9 % (ref 0–1.5)
CREAT SERPL-MCNC: 0.63 MG/DL (ref 0.76–1.27)
DEPRECATED RDW RBC AUTO: 46.5 FL (ref 37–54)
EGFRCR SERPLBLD CKD-EPI 2021: 104.3 ML/MIN/1.73
EOSINOPHIL # BLD AUTO: 0.25 10*3/MM3 (ref 0–0.4)
EOSINOPHIL NFR BLD AUTO: 3.3 % (ref 0.3–6.2)
ERYTHROCYTE [DISTWIDTH] IN BLOOD BY AUTOMATED COUNT: 14.6 % (ref 12.3–15.4)
HCT VFR BLD AUTO: 37 % (ref 37.5–51)
HGB BLD-MCNC: 12.1 G/DL (ref 13–17.7)
IMM GRANULOCYTES # BLD AUTO: 0.03 10*3/MM3 (ref 0–0.05)
IMM GRANULOCYTES NFR BLD AUTO: 0.4 % (ref 0–0.5)
LYMPHOCYTES # BLD AUTO: 1.35 10*3/MM3 (ref 0.7–3.1)
LYMPHOCYTES NFR BLD AUTO: 17.9 % (ref 19.6–45.3)
MCH RBC QN AUTO: 28.7 PG (ref 26.6–33)
MCHC RBC AUTO-ENTMCNC: 32.7 G/DL (ref 31.5–35.7)
MCV RBC AUTO: 87.9 FL (ref 79–97)
MONOCYTES # BLD AUTO: 0.44 10*3/MM3 (ref 0.1–0.9)
MONOCYTES NFR BLD AUTO: 5.8 % (ref 5–12)
NEUTROPHILS NFR BLD AUTO: 5.4 10*3/MM3 (ref 1.7–7)
NEUTROPHILS NFR BLD AUTO: 71.7 % (ref 42.7–76)
NRBC BLD AUTO-RTO: 0 /100 WBC (ref 0–0.2)
PLATELET # BLD AUTO: 409 10*3/MM3 (ref 140–450)
PMV BLD AUTO: 9.4 FL (ref 6–12)
RBC # BLD AUTO: 4.21 10*6/MM3 (ref 4.14–5.8)
WBC NRBC COR # BLD: 7.54 10*3/MM3 (ref 3.4–10.8)

## 2023-06-12 PROCEDURE — 82565 ASSAY OF CREATININE: CPT | Performed by: STUDENT IN AN ORGANIZED HEALTH CARE EDUCATION/TRAINING PROGRAM

## 2023-06-12 PROCEDURE — G0299 HHS/HOSPICE OF RN EA 15 MIN: HCPCS

## 2023-06-12 PROCEDURE — 85025 COMPLETE CBC W/AUTO DIFF WBC: CPT | Performed by: STUDENT IN AN ORGANIZED HEALTH CARE EDUCATION/TRAINING PROGRAM

## 2023-06-12 NOTE — HOME HEALTH
SOC NOTE:    [facility] needed for:  osteomyelitis of vertebra, sacral and sacroccygeal region    Primary diagnoses/co-morbidities/recent procedures in past 60 days that impact current episode:  osteomyelitis of pubic symphseal    Current level of functional ability: ambulate with assist     Homebound status and living arrangements: Considerable effort due to weakness, infection.      Skilled need:  teaching of IV therapy, meds, s/s infection to report, care of fuentes catheter.     Focus of care for next 60 days for each discipline ordered: osteomyelitis of pubic symphseal    Skin integrity/wound status: no skin issues at this time    Code status: Full    Most recent fall risk: High     Estimated date when home care services will end:  8/7/23    Plan of Care confirmed with Dr Landis via in basket on 6/9/23

## 2023-06-14 VITALS
SYSTOLIC BLOOD PRESSURE: 124 MMHG | OXYGEN SATURATION: 99 % | DIASTOLIC BLOOD PRESSURE: 68 MMHG | HEART RATE: 71 BPM | RESPIRATION RATE: 18 BRPM | TEMPERATURE: 98.1 F

## 2023-06-14 NOTE — HOME HEALTH
PLAN FOR NEXT VISIT:  WEEKLY PICC LINE DRESSING CHANGE, WEEKLY LABS, ASSESS CATHETER, TEACHING ON MEDS

## 2023-06-15 DIAGNOSIS — R79.89 ABNORMAL CBC: ICD-10-CM

## 2023-06-15 DIAGNOSIS — E03.9 HYPOTHYROIDISM (ACQUIRED): Primary | ICD-10-CM

## 2023-08-04 RX ORDER — CEPHALEXIN 500 MG/1
CAPSULE ORAL
Qty: 30 CAPSULE | Refills: 0 | Status: SHIPPED | OUTPATIENT
Start: 2023-08-04

## 2023-08-11 ENCOUNTER — TRANSCRIBE ORDERS (OUTPATIENT)
Dept: ADMINISTRATIVE | Facility: HOSPITAL | Age: 68
End: 2023-08-11
Payer: MEDICARE

## 2023-08-11 DIAGNOSIS — M86.9 OSTEOMYELITIS, PELVIS: Primary | ICD-10-CM

## 2023-08-17 ENCOUNTER — HOSPITAL ENCOUNTER (OUTPATIENT)
Dept: MRI IMAGING | Facility: HOSPITAL | Age: 68
Discharge: HOME | End: 2023-08-17
Admitting: ORTHOPAEDIC SURGERY
Payer: COMMERCIAL

## 2023-08-17 DIAGNOSIS — M86.9 OSTEOMYELITIS, PELVIS: ICD-10-CM

## 2023-08-17 PROCEDURE — A9577 INJ MULTIHANCE: HCPCS | Performed by: ORTHOPAEDIC SURGERY

## 2023-08-17 PROCEDURE — 72197 MRI PELVIS W/O & W/DYE: CPT

## 2023-08-17 PROCEDURE — 0 GADOBENATE DIMEGLUMINE 529 MG/ML SOLUTION: Performed by: ORTHOPAEDIC SURGERY

## 2023-08-17 RX ADMIN — GADOBENATE DIMEGLUMINE 20 ML: 529 INJECTION, SOLUTION INTRAVENOUS at 07:35

## 2023-08-21 ENCOUNTER — PATIENT MESSAGE (OUTPATIENT)
Dept: INFECTIOUS DISEASES | Facility: CLINIC | Age: 68
End: 2023-08-21
Payer: MEDICARE

## 2023-08-21 RX ORDER — CEPHALEXIN 500 MG/1
500 CAPSULE ORAL 2 TIMES DAILY
Qty: 180 CAPSULE | Refills: 0 | Status: SHIPPED | OUTPATIENT
Start: 2023-08-21 | End: 2023-11-19

## 2023-08-21 NOTE — TELEPHONE ENCOUNTER
From: Girish Greenberg  To: Kevin qAuino  Sent: 8/21/2023 7:27 AM EDT  Subject: Question regarding MRI PELVIS W WO CONTRAST    I need a refill until I have surgery

## 2023-08-28 NOTE — TELEPHONE ENCOUNTER
Caller: Girish Greenberg    Relationship to patient: Self    Best call back number: [phone]    Patient is needing: PATIENT RETURNED A CALL TO JOSETTE. PLEASE CALL PATIENT BACK           Dr. Hannon

## 2023-09-11 ENCOUNTER — TELEPHONE (OUTPATIENT)
Dept: INTERNAL MEDICINE | Facility: CLINIC | Age: 68
End: 2023-09-11
Payer: MEDICARE

## 2023-09-11 NOTE — TELEPHONE ENCOUNTER
Provider: JOHNATHAN WATSON    Caller: PAO COLEMAN    Relationship to Patient: SELF    Phone Number: [phone]     Reason for Call: PATIENT IS HAVING SURGERY IN NOVEMBER OF 2023 AND IS NEEDING TO DISCUSS WITH JOSETTE, THE RESULTS OF HIS COLONOSCOPY, HE NEEDS THESE TEST RESULTS FAXED TO THE SURGEON OFFICE.     PLEASE CALL TO ADVISE.

## 2023-10-03 ENCOUNTER — FLU SHOT (OUTPATIENT)
Dept: INTERNAL MEDICINE | Facility: CLINIC | Age: 68
End: 2023-10-03
Payer: MEDICARE

## 2023-10-03 DIAGNOSIS — Z00.00 HEALTHCARE MAINTENANCE: Primary | ICD-10-CM

## 2023-10-03 PROCEDURE — G0008 ADMIN INFLUENZA VIRUS VAC: HCPCS | Performed by: INTERNAL MEDICINE

## 2023-10-03 PROCEDURE — 90662 IIV NO PRSV INCREASED AG IM: CPT | Performed by: INTERNAL MEDICINE

## 2023-10-03 PROCEDURE — 96372 THER/PROPH/DIAG INJ SC/IM: CPT | Performed by: INTERNAL MEDICINE

## 2023-11-28 ENCOUNTER — TELEPHONE (OUTPATIENT)
Dept: INTERNAL MEDICINE | Facility: CLINIC | Age: 68
End: 2023-11-28
Payer: MEDICARE

## 2023-11-28 NOTE — TELEPHONE ENCOUNTER
Caller: Girish Greenberg    Relationship: Self    Best call back number: [phone]     What is the best time to reach you: ANY    Who are you requesting to speak with (clinical staff, provider,  specific staff member): JOSETTE    Do you know the name of the person who called: GIRISH    What was the call regarding: PATIENT WOULD LIKE TO SPEAK TO THE OFFICE IN REGARDS TO A PROCEDURE HE IS HAVING.    Is it okay if the provider responds through MyChart:

## 2023-11-28 NOTE — TELEPHONE ENCOUNTER
Pt will send in a order he wants dr berg to order so he does not have to go [place] twice

## 2023-12-05 ENCOUNTER — TELEPHONE (OUTPATIENT)
Dept: INTERNAL MEDICINE | Facility: CLINIC | Age: 68
End: 2023-12-05

## 2023-12-05 NOTE — TELEPHONE ENCOUNTER
Caller: Girish Greenberg    Relationship: Self    Best call back number: [phone]     Who are you requesting to speak with (clinical staff, provider,  specific staff member): JOSETTE    What was the call regarding: PATIENT REQUESTS A CALL BACK TO FOLLOW UP IF DR OMALLEY GOT HIM SET UP FOR A TEST, THOUGH HE  COULDN'T REMEMBER WHAT TEST IT WAS SUPPOSED TO BE

## 2023-12-06 DIAGNOSIS — C61 PROSTATE CANCER: Primary | ICD-10-CM

## 2023-12-06 DIAGNOSIS — L98.8 FISTULA: ICD-10-CM

## 2023-12-15 ENCOUNTER — HOSPITAL ENCOUNTER (OUTPATIENT)
Dept: CT IMAGING | Facility: HOSPITAL | Age: 68
Discharge: HOME | End: 2023-12-15
Admitting: INTERNAL MEDICINE
Payer: COMMERCIAL

## 2023-12-15 DIAGNOSIS — C61 PROSTATE CANCER: ICD-10-CM

## 2023-12-15 DIAGNOSIS — L98.8 FISTULA: ICD-10-CM

## 2023-12-15 LAB — CREAT BLDA-MCNC: 1.8 MG/DL (ref 0.6–1.3)

## 2023-12-15 PROCEDURE — 82565 ASSAY OF CREATININE: CPT

## 2023-12-15 PROCEDURE — 25510000001 IOPAMIDOL 61 % SOLUTION: Performed by: INTERNAL MEDICINE

## 2023-12-15 PROCEDURE — 74178 CT ABD&PLV WO CNTR FLWD CNTR: CPT

## 2023-12-15 RX ADMIN — IOPAMIDOL 85 ML: 612 INJECTION, SOLUTION INTRAVENOUS at 18:08

## 2023-12-18 DIAGNOSIS — C61 PROSTATE CANCER: ICD-10-CM

## 2023-12-18 DIAGNOSIS — N17.9 ACUTE RENAL FAILURE, UNSPECIFIED ACUTE RENAL FAILURE TYPE: ICD-10-CM

## 2023-12-18 DIAGNOSIS — Z85.46 HISTORY OF PROSTATE CANCER: ICD-10-CM

## 2023-12-18 DIAGNOSIS — E03.9 HYPOTHYROIDISM (ACQUIRED): Primary | ICD-10-CM

## 2023-12-29 ENCOUNTER — TELEPHONE (OUTPATIENT)
Dept: INTERNAL MEDICINE | Facility: CLINIC | Age: 68
End: 2023-12-29
Payer: MEDICARE

## 2023-12-29 NOTE — TELEPHONE ENCOUNTER
----- Message from Regina Landis MD sent at 12/28/2023  6:27 PM EST -----  Called and discussed results w/ patient. He has an AWV w/ me 1/5 and a follow up w urology scheduled.   LILIAN

## 2024-01-03 LAB
ALBUMIN SERPL-MCNC: 4.5 G/DL (ref 3.5–5.2)
ALBUMIN/GLOB SERPL: 1.9 G/DL
ALP SERPL-CCNC: 43 U/L (ref 39–117)
ALT SERPL-CCNC: 37 U/L (ref 1–41)
AST SERPL-CCNC: 25 U/L (ref 1–40)
BASOPHILS # BLD AUTO: 0.06 10*3/MM3 (ref 0–0.2)
BASOPHILS NFR BLD AUTO: 0.9 % (ref 0–1.5)
BILIRUB SERPL-MCNC: 0.3 MG/DL (ref 0–1.2)
BUN SERPL-MCNC: 22 MG/DL (ref 8–23)
BUN/CREAT SERPL: 22.4 (ref 7–25)
CALCIUM SERPL-MCNC: 9.9 MG/DL (ref 8.6–10.5)
CHLORIDE SERPL-SCNC: 101 MMOL/L (ref 98–107)
CHOLEST SERPL-MCNC: 248 MG/DL (ref 0–200)
CO2 SERPL-SCNC: 23.4 MMOL/L (ref 22–29)
CREAT SERPL-MCNC: 0.98 MG/DL (ref 0.76–1.27)
EGFRCR SERPLBLD CKD-EPI 2021: 84 ML/MIN/1.73
EOSINOPHIL # BLD AUTO: 0.06 10*3/MM3 (ref 0–0.4)
EOSINOPHIL NFR BLD AUTO: 0.9 % (ref 0.3–6.2)
ERYTHROCYTE [DISTWIDTH] IN BLOOD BY AUTOMATED COUNT: 13.6 % (ref 12.3–15.4)
GLOBULIN SER CALC-MCNC: 2.4 GM/DL
GLUCOSE SERPL-MCNC: 105 MG/DL (ref 65–99)
HCT VFR BLD AUTO: 45.9 % (ref 37.5–51)
HDLC SERPL-MCNC: 78 MG/DL (ref 40–60)
HGB BLD-MCNC: 14.8 G/DL (ref 13–17.7)
IMM GRANULOCYTES # BLD AUTO: 0.02 10*3/MM3 (ref 0–0.05)
IMM GRANULOCYTES NFR BLD AUTO: 0.3 % (ref 0–0.5)
LDLC SERPL CALC-MCNC: 143 MG/DL (ref 0–100)
LDLC/HDLC SERPL: 1.79 {RATIO}
LYMPHOCYTES # BLD AUTO: 1.3 10*3/MM3 (ref 0.7–3.1)
LYMPHOCYTES NFR BLD AUTO: 19.1 % (ref 19.6–45.3)
MCH RBC QN AUTO: 27.9 PG (ref 26.6–33)
MCHC RBC AUTO-ENTMCNC: 32.2 G/DL (ref 31.5–35.7)
MCV RBC AUTO: 86.6 FL (ref 79–97)
MONOCYTES # BLD AUTO: 0.65 10*3/MM3 (ref 0.1–0.9)
MONOCYTES NFR BLD AUTO: 9.6 % (ref 5–12)
NEUTROPHILS # BLD AUTO: 4.71 10*3/MM3 (ref 1.7–7)
NEUTROPHILS NFR BLD AUTO: 69.2 % (ref 42.7–76)
NRBC BLD AUTO-RTO: 0 /100 WBC (ref 0–0.2)
PLATELET # BLD AUTO: 351 10*3/MM3 (ref 140–450)
POTASSIUM SERPL-SCNC: 4.5 MMOL/L (ref 3.5–5.2)
PROT SERPL-MCNC: 6.9 G/DL (ref 6–8.5)
RBC # BLD AUTO: 5.3 10*6/MM3 (ref 4.14–5.8)
SODIUM SERPL-SCNC: 137 MMOL/L (ref 136–145)
TRIGL SERPL-MCNC: 153 MG/DL (ref 0–150)
TSH SERPL DL<=0.005 MIU/L-ACNC: 3.36 UIU/ML (ref 0.27–4.2)
VLDLC SERPL CALC-MCNC: 27 MG/DL (ref 5–40)
WBC # BLD AUTO: 6.8 10*3/MM3 (ref 3.4–10.8)

## 2024-01-05 ENCOUNTER — OFFICE VISIT (OUTPATIENT)
Dept: INTERNAL MEDICINE | Facility: CLINIC | Age: 69
End: 2024-01-05
Payer: MEDICARE

## 2024-01-05 VITALS
OXYGEN SATURATION: 96 % | HEART RATE: 72 BPM | SYSTOLIC BLOOD PRESSURE: 134 MMHG | BODY MASS INDEX: 29.12 KG/M2 | DIASTOLIC BLOOD PRESSURE: 80 MMHG | HEIGHT: 72 IN | WEIGHT: 215 LBS

## 2024-01-05 DIAGNOSIS — E03.9 HYPOTHYROIDISM (ACQUIRED): ICD-10-CM

## 2024-01-05 DIAGNOSIS — C61 PROSTATE CANCER: ICD-10-CM

## 2024-01-05 DIAGNOSIS — Z00.00 HEALTHCARE MAINTENANCE: Primary | ICD-10-CM

## 2024-01-05 DIAGNOSIS — M86.9 OSTEOMYELITIS OF MULTIPLE SITES, UNSPECIFIED TYPE: ICD-10-CM

## 2024-01-05 DIAGNOSIS — R93.5 ABNORMAL CT OF THE ABDOMEN: ICD-10-CM

## 2024-01-05 DIAGNOSIS — M19.90 OSTEOARTHRITIS, UNSPECIFIED OSTEOARTHRITIS TYPE, UNSPECIFIED SITE: ICD-10-CM

## 2024-01-05 RX ORDER — ATORVASTATIN CALCIUM 10 MG/1
10 TABLET, FILM COATED ORAL DAILY
Qty: 90 TABLET | Refills: 1 | Status: SHIPPED | OUTPATIENT
Start: 2024-01-05

## 2024-01-05 RX ORDER — OXYBUTYNIN CHLORIDE 5 MG/1
1 TABLET, EXTENDED RELEASE ORAL DAILY
COMMUNITY
Start: 2023-12-05

## 2024-01-05 NOTE — PROGRESS NOTES
The ABCs of the Annual Wellness Visit  Subsequent Medicare Wellness Visit    Subjective    Girish Greenberg is a 68 y.o. male who presents for a Subsequent Medicare Wellness Visit.    The following portions of the patient's history were reviewed and   updated as appropriate: allergies, current medications, past family history, past medical history, past social history, past surgical history, and problem list.    Compared to one year ago, the patient feels his physical   health is worse.    Compared to one year ago, the patient feels his mental   health is worse.    Recent Hospitalizations:  This patient has had a [facility] admission record on file within the last 365 days.    Current Medical Providers:  Patient Care Team:  Regina Landis MD as PCP - General (Internal Medicine)  Regina Landis MD as PCP - Family Medicine  Slade Borrego MD as Consulting Physician (Urology)  Brian Sabillon MD as Consulting Physician (General Surgery)    Outpatient Medications Prior to Visit   Medication Sig Dispense Refill    albuterol (PROVENTIL HFA;VENTOLIN HFA) 108 (90 BASE) MCG/ACT inhaler Inhale 2 puffs Every 6 (Six) Hours As Needed for Shortness of Air. Indications: Chronic Obstructive Lung Disease      budesonide-formoterol (SYMBICORT) 160-4.5 MCG/ACT inhaler Inhale 2 puffs As Needed (SOA). Indications: Chronic Obstructive Lung Disease      Cholecalciferol (VITAMIN D PO) Take 1 capsule by mouth Daily. HOLDING FOR OR  Indications: Vitamin D deficiency      Coenzyme Q10 (CO Q 10 PO) Take 1 capsule by mouth Daily. HOLD FOR SURGERY      Cyanocobalamin (B-12 PO) Take 1 capsule by mouth Daily. HOLDING FOR OR  Indications: Vitamin B Deficiency      diphenhydrAMINE (BENADRYL) 25 MG tablet Take 2 tablets by mouth At Night As Needed for Allergies or Itching. Indications: Skin Reaction due to an Allergy      EPINEPHrine, Anaphylaxis, (ADRENALIN) 1 MG/ML injection Inject 0.3 mL into the appropriate muscle as directed by  prescriber As Needed for Anaphylaxis. Indications: Life-Threatening Hypersensitivity Reaction      escitalopram (Lexapro) 10 MG tablet Take 1 tablet by mouth Daily. 90 tablet 3    Hydrocort-Pramoxine, Perianal, (ANALPRAM-HC) 2.5-1 % rectal cream Insert  into the rectum 3 (Three) Times a Day. 30 g 2    levothyroxine (SYNTHROID, LEVOTHROID) 50 MCG tablet TAKE 1 TABLET BY MOUTH EVERY DAY 30 tablet 17    magnesium oxide (MAG-OX) 400 tablet tablet Take 1 tablet by mouth Daily. Indications: Disorder with Low Magnesium Levels      Multiple Vitamins-Minerals (b complex-C-E-zinc) tablet Take 1 tablet by mouth Daily. Indications: Other vitamin deficiencies      naloxone (NARCAN) 4 MG/0.1ML nasal spray Call 911. Don't prime. [place] in 1 nostril for overdose. Repeat in 2-3 minutes in other nostril if no or minimal breathing/responsiveness. 2 each 0    oxybutynin XL (DITROPAN-XL) 5 MG 24 hr tablet Take 1 tablet by mouth Daily.      Red Yeast Rice Extract (RED YEAST RICE PO) Take 1 capsule by mouth Daily. HOLD FOR SURGERY      TURMERIC CURCUMIN PO Take 1 capsule by mouth Daily. HOLD FOR SURGERY       No facility-administered medications prior to visit.       No opioid medication identified on active medication list. I have reviewed chart for other potential  high risk medication/s and harmful drug interactions in the elderly.        Aspirin is not on active medication list.  Aspirin use is not indicated based on review of current medical condition/s. Risk of harm outweighs potential benefits.  .    Patient Active Problem List   Diagnosis    Prostate cancer    Rib pain on left side    Obstructive sleep apnea syndrome    Osteoarthritis    Reactive depression    Bilateral low back pain without sciatica    Family history of colon cancer    Abnormal CT of the abdomen    History of prostate cancer    Abdominal pain, RLQ    Hypothyroidism (acquired)    Osteomyelitis, pelvic    Rash of back     Advance Care Planning   Advance Care  "Planning     Advance Directive is not on file.  ACP discussion was held with the patient during this visit. Patient does not have an advance directive, information provided.     Objective    Vitals:    24 1039   BP: 134/80   Pulse: 72   SpO2: 96%   Weight: 97.5 kg (215 lb)   Height: 182.9 cm (72\")     Estimated body mass index is 29.16 kg/m² as calculated from the following:    Height as of this encounter: 182.9 cm (72\").    Weight as of this encounter: 97.5 kg (215 lb).    BMI is >= 25 and <30. (Overweight) The following options were offered after discussion;: exercise counseling/recommendations and nutrition counseling/recommendations      Does the patient have evidence of cognitive impairment? No    Lab Results   Component Value Date    CHLPL 248 (H) 2024    TRIG 153 (H) 2024    HDL 78 (H) 2024     (H) 2024    VLDL 27 2024        HEALTH RISK ASSESSMENT    Smoking Status:  Social History     Tobacco Use   Smoking Status Never   Smokeless Tobacco Never     Alcohol Consumption:  Social History     Substance and Sexual Activity   Alcohol Use Not Currently    Alcohol/week: 2.0 standard drinks of alcohol    Types: 2 Cans of beer per week    Comment: 2 PER DAY     Fall Risk Screen:    STEADI Fall Risk Assessment was completed, and patient is at LOW risk for falls.Assessment completed on:2024    Depression Screenin/5/2024    10:42 AM   PHQ-2/PHQ-9 Depression Screening   Little Interest or Pleasure in Doing Things 0-->not at all   Feeling Down, Depressed or Hopeless 0-->not at all   PHQ-9: Brief Depression Severity Measure Score 0       Health Habits and Functional and Cognitive Screening:       No data to display                Age-appropriate Screening Schedule:  Refer to the list below for future screening recommendations based on patient's age, sex and/or medical conditions. Orders for these recommended tests are listed in the plan section. The patient has been " provided with a written plan.    Health Maintenance   Topic Date Due    URINE MICROALBUMIN  Never done    ZOSTER VACCINE (1 of 2) Never done    Hepatitis B (1 of 3 - Risk 3-dose series) Never done    DIABETIC FOOT EXAM  Never done    HEMOGLOBIN A1C  04/25/2017    DIABETIC EYE EXAM  03/01/2022    COVID-19 Vaccine (3 - 2023-24 season) 09/01/2023    COLORECTAL CANCER SCREENING  02/02/2024    ANNUAL WELLNESS VISIT  01/05/2025    BMI FOLLOWUP  01/05/2025    TDAP/TD VACCINES (4 - Td or Tdap) 09/04/2028    HEPATITIS C SCREENING  Completed    INFLUENZA VACCINE  Completed    Pneumococcal Vaccine 65+  Completed                  CMS Preventative Services Quick Reference  Risk Factors Identified During Encounter  Immunizations Discussed/Encouraged: Shingrix, COVID19, and RSV (Respiratory Syncytial Virus)  The above risks/problems have been discussed with the patient.  Pertinent information has been shared with the patient in the After Visit Summary.  An After Visit Summary and PPPS were made available to the patient.    Follow Up:   Next Medicare Wellness visit to be scheduled in 1 year.       Additional E&M Note during same encounter follows:  Patient has multiple medical problems which are significant and separately identifiable that require additional work above and beyond the Medicare Wellness Visit.      Chief Complaint  Annual Exam  Prostate cancer/ abnormal imaging  Hyperlipidemia w cardiac calcifications  Recent uri  Hypothyroid      Subjective        HPI  Girish Greenberg is also being seen today for AWV, to review chronic issues, and to address acute needs. Patient has history prostate cancer. He is s/p prostatectomy. Recent fistula and now has suprapubic catheter. Patient has recent ct urogram for this. Incidental finding of sclerotic bone lesions/ possible mets. Per report unchanged from June but new from studies prior to that. Mild low back pain. Managed w/ epidural injections quarterly.   Patient has hypothyroidism.  "Tsh 3.36. lipids w/ ldl 143. Prior ct scan notes coronary artery calcifications. Recent uri. Symptoms now resolving.              Objective   Vital Signs:  /80   Pulse 72   Ht 182.9 cm (72\")   Wt 97.5 kg (215 lb)   SpO2 96%   BMI 29.16 kg/m²     Physical Exam  Vitals and nursing note reviewed.   Constitutional:       Appearance: Normal appearance. He is well-developed.   HENT:      Head: Normocephalic and atraumatic.      Right Ear: Tympanic membrane and external ear normal.      Left Ear: Tympanic membrane and external ear normal.      Nose: Nose normal.      Mouth/Throat:      Mouth: Mucous membranes are moist.   Eyes:      Extraocular Movements: Extraocular movements intact.      Pupils: Pupils are equal, round, and reactive to light.   Cardiovascular:      Rate and Rhythm: Normal rate and regular rhythm.      Pulses: Normal pulses.      Heart sounds: Normal heart sounds.   Pulmonary:      Effort: Pulmonary effort is normal. No respiratory distress.      Breath sounds: Normal breath sounds.   Abdominal:      General: Abdomen is flat.      Palpations: Abdomen is soft.   Musculoskeletal:         General: Normal range of motion.      Cervical back: Normal range of motion and neck supple.   Skin:     General: Skin is warm and dry.   Neurological:      General: No focal deficit present.      Mental Status: He is alert and oriented to person, place, and time.   Psychiatric:         Mood and Affect: Mood normal.         Behavior: Behavior normal.         Thought Content: Thought content normal.         Judgment: Judgment normal.          The following data was reviewed by: Regina Landis MD on 01/05/2024:  CMP          7/17/2023    09:20 12/15/2023    17:39 1/2/2024    09:01   CMP   Glucose   105    BUN   22    Creatinine 0.78  1.80  0.98    EGFR 97.7      Sodium   137    Potassium   4.5    Chloride   101    Calcium   9.9    Total Protein   6.9    Albumin   4.5    Globulin   2.4    Total Bilirubin   0.3  "   Alkaline Phosphatase   43    AST (SGOT)   25    ALT (SGPT)   37    BUN/Creatinine Ratio   22.4      CBC w/diff          7/10/2023    11:30 7/17/2023    09:20 1/2/2024    09:01   CBC w/Diff   WBC 6.24  6.21  6.80    RBC 4.86  4.93  5.30    Hemoglobin 13.9  14.2  14.8    Hematocrit 42.4  42.1  45.9    MCV 87.2  85.4  86.6    MCH 28.6  28.8  27.9    MCHC 32.8  33.7  32.2    RDW 14.6  14.5  13.6    Platelets 306  324  351    Neutrophil Rel % 64.7  64.0  69.2    Immature Granulocyte Rel % 0.2  0.3     Lymphocyte Rel % 23.2  23.2  19.1    Monocyte Rel % 7.5  7.4  9.6    Eosinophil Rel % 3.0  3.7  0.9    Basophil Rel % 1.4  1.4  0.9      Lipid Panel          1/2/2024    09:01   Lipid Panel   Total Cholesterol 248    Triglycerides 153    HDL Cholesterol 78    VLDL Cholesterol 27    LDL Cholesterol  143    LDL/HDL Ratio 1.79      TSH          6/20/2023    10:30 1/2/2024    09:01   TSH   TSH 1.660  3.360      UA          6/5/2023    11:15   Urinalysis   Squamous Epithelial Cells, UA 0-2    Specific Gravity, UA >=1.030    Ketones, UA Negative    Blood, UA Moderate (2+)    Leukocytes, UA Large (3+)    Nitrite, UA Negative    RBC, UA 6-12    WBC, UA Too Numerous to Count    Bacteria, UA 4+      Data reviewed : Radiologic studies ct scan and Consultant notes urology           Assessment and Plan   Diagnoses and all orders for this visit:    1. Healthcare maintenance (Primary)    2. Prostate cancer  -     Cancel: NM PET/CT Skull Base to Mid Thigh; Future    3. Abnormal CT of the abdomen  -     Cancel: NM PET/CT Skull Base to Mid Thigh; Future  -     NM bone scan whole body; Future    4. Hypothyroidism (acquired)    5. Osteoarthritis, unspecified osteoarthritis type, unspecified site    6. Osteomyelitis of multiple sites, unspecified type    Other orders  -     atorvastatin (LIPITOR) 10 MG tablet; Take 1 tablet by mouth Daily.  Dispense: 90 tablet; Refill: 1    Patient w/ h/o prostate cancer w/ fistula formation and  osteomyelitis. Reviewed ct abd pelvis. Did not see any infectious formation. However, sclerotic lesion in spine possibly related to spinal mets. Will get bone scan. Will f/u w/ uro oncology as scheduled. Further treatment per results. Patient has hypothyroidism and will monitor tsh and free t4. He has oa and will take tylenol prn. He has elevated ld w/ radiological evidence coronary calcification. Will start atorvastatin 10 mg daily.          I spent 45 minutes caring for Girish on this date of service. This time includes time spent by me in the following activities:preparing for the visit, reviewing tests, obtaining and/or reviewing a separately obtained history, performing a medically appropriate examination and/or evaluation , counseling and educating the patient/family/caregiver, ordering medications, tests, or procedures, referring and communicating with other health care professionals , documenting information in the medical record, and independently interpreting results and communicating that information with the patient/family/caregiver  Follow Up   Return in about 6 months (around 7/5/2024) for Recheck.  Patient was given instructions and counseling regarding his condition or for health maintenance advice. Please see specific information pulled into the AVS if appropriate.     Patient has been erroneously marked as diabetic. Based on the available clinical information, he does not have diabetes and should therefore be excluded from diabetic health maintenance and quality measures for the remainder of the reporting period.

## 2024-02-27 RX ORDER — LEVOTHYROXINE SODIUM 0.05 MG/1
TABLET ORAL
Qty: 90 TABLET | Refills: 5 | Status: SHIPPED | OUTPATIENT
Start: 2024-02-27

## 2024-04-03 ENCOUNTER — TRANSCRIBE ORDERS (OUTPATIENT)
Dept: ADMINISTRATIVE | Facility: HOSPITAL | Age: 69
End: 2024-04-03
Payer: MEDICARE

## 2024-04-03 DIAGNOSIS — R91.8 PULMONARY NODULES: Primary | ICD-10-CM

## 2024-04-29 ENCOUNTER — HOSPITAL ENCOUNTER (OUTPATIENT)
Dept: CT IMAGING | Facility: HOSPITAL | Age: 69
Discharge: HOME | End: 2024-04-29
Admitting: INTERNAL MEDICINE
Payer: COMMERCIAL

## 2024-04-29 DIAGNOSIS — R91.8 PULMONARY NODULES: ICD-10-CM

## 2024-04-29 PROCEDURE — 71250 CT THORAX DX C-: CPT

## 2024-06-25 DIAGNOSIS — E78.89 LIPIDS ABNORMAL: ICD-10-CM

## 2024-06-25 DIAGNOSIS — E03.9 HYPOTHYROIDISM (ACQUIRED): Primary | ICD-10-CM

## 2024-12-19 DIAGNOSIS — E03.9 HYPOTHYROIDISM (ACQUIRED): ICD-10-CM

## 2024-12-19 DIAGNOSIS — Z13.220 LIPID SCREENING: ICD-10-CM

## 2024-12-19 DIAGNOSIS — Z12.5 SCREENING FOR PROSTATE CANCER: Primary | ICD-10-CM

## 2024-12-26 ENCOUNTER — TELEPHONE (OUTPATIENT)
Dept: INTERNAL MEDICINE | Facility: CLINIC | Age: 69
End: 2024-12-26

## 2024-12-26 NOTE — TELEPHONE ENCOUNTER
Caller: Girish Greenberg    Relationship to patient: Self    Best call back number: [phone]     Type of visit: SUBSEQUENT MEDICARE WELLNESS VISIT    Requested date: ANY FRIDAY      If rescheduling, when is the original appointment: 01.07.24     Additional notes:PATIENT IS CURRENTLY WORKING OUT OF TOWN AND CAN ONLY COME IN ON FRIDAY. FIRST AVAILABLE ISN'T UNTIL JUNE. PATIENT DIDN'T KNOW IF THAT WAS TO FAR OUT.  PLEASE ADVISE?

## 2025-01-12 LAB
ALBUMIN SERPL-MCNC: 4.3 G/DL (ref 3.5–5.2)
ALBUMIN/GLOB SERPL: 1.8 G/DL
ALP SERPL-CCNC: 35 U/L (ref 39–117)
ALT SERPL-CCNC: 17 U/L (ref 1–41)
APPEARANCE UR: CLEAR
AST SERPL-CCNC: 20 U/L (ref 1–40)
BACTERIA #/AREA URNS HPF: NORMAL /[HPF]
BACTERIA UR CULT: NORMAL
BACTERIA UR CULT: NORMAL
BASOPHILS # BLD AUTO: 0.08 10*3/MM3 (ref 0–0.2)
BASOPHILS NFR BLD AUTO: 1.4 % (ref 0–1.5)
BILIRUB SERPL-MCNC: 0.5 MG/DL (ref 0–1.2)
BILIRUB UR QL STRIP: NEGATIVE
BUN SERPL-MCNC: 16 MG/DL (ref 8–23)
BUN/CREAT SERPL: 16.7 (ref 7–25)
CALCIUM SERPL-MCNC: 9.7 MG/DL (ref 8.6–10.5)
CASTS URNS QL MICRO: NORMAL /LPF
CHLORIDE SERPL-SCNC: 99 MMOL/L (ref 98–107)
CHOLEST SERPL-MCNC: 259 MG/DL (ref 0–200)
CO2 SERPL-SCNC: 24.7 MMOL/L (ref 22–29)
COLOR UR: YELLOW
CREAT SERPL-MCNC: 0.96 MG/DL (ref 0.76–1.27)
EGFRCR SERPLBLD CKD-EPI 2021: 85.6 ML/MIN/1.73
EOSINOPHIL # BLD AUTO: 0.21 10*3/MM3 (ref 0–0.4)
EOSINOPHIL NFR BLD AUTO: 3.6 % (ref 0.3–6.2)
EPI CELLS #/AREA URNS HPF: NORMAL /HPF (ref 0–10)
ERYTHROCYTE [DISTWIDTH] IN BLOOD BY AUTOMATED COUNT: 12.4 % (ref 12.3–15.4)
GLOBULIN SER CALC-MCNC: 2.4 GM/DL
GLUCOSE SERPL-MCNC: 87 MG/DL (ref 65–99)
GLUCOSE UR QL STRIP: NEGATIVE
HCT VFR BLD AUTO: 46 % (ref 37.5–51)
HDLC SERPL-MCNC: 63 MG/DL (ref 40–60)
HGB BLD-MCNC: 15.8 G/DL (ref 13–17.7)
HGB UR QL STRIP: NEGATIVE
IMM GRANULOCYTES # BLD AUTO: 0.02 10*3/MM3 (ref 0–0.05)
IMM GRANULOCYTES NFR BLD AUTO: 0.3 % (ref 0–0.5)
KETONES UR QL STRIP: ABNORMAL
LDLC SERPL CALC-MCNC: 181 MG/DL (ref 0–100)
LEUKOCYTE ESTERASE UR QL STRIP: ABNORMAL
LYMPHOCYTES # BLD AUTO: 1.41 10*3/MM3 (ref 0.7–3.1)
LYMPHOCYTES NFR BLD AUTO: 24.3 % (ref 19.6–45.3)
MCH RBC QN AUTO: 30.3 PG (ref 26.6–33)
MCHC RBC AUTO-ENTMCNC: 34.3 G/DL (ref 31.5–35.7)
MCV RBC AUTO: 88.3 FL (ref 79–97)
MICRO URNS: ABNORMAL
MONOCYTES # BLD AUTO: 0.47 10*3/MM3 (ref 0.1–0.9)
MONOCYTES NFR BLD AUTO: 8.1 % (ref 5–12)
MUCOUS THREADS URNS QL MICRO: PRESENT
NEUTROPHILS # BLD AUTO: 3.61 10*3/MM3 (ref 1.7–7)
NEUTROPHILS NFR BLD AUTO: 62.3 % (ref 42.7–76)
NITRITE UR QL STRIP: POSITIVE
NRBC BLD AUTO-RTO: 0 /100 WBC (ref 0–0.2)
PH UR STRIP: 6 [PH] (ref 5–7.5)
PLATELET # BLD AUTO: 331 10*3/MM3 (ref 140–450)
POTASSIUM SERPL-SCNC: 4.4 MMOL/L (ref 3.5–5.2)
PROT SERPL-MCNC: 6.7 G/DL (ref 6–8.5)
PROT UR QL STRIP: NEGATIVE
PSA SERPL-MCNC: 0.78 NG/ML (ref 0–4)
RBC # BLD AUTO: 5.21 10*6/MM3 (ref 4.14–5.8)
RBC #/AREA URNS HPF: NORMAL /HPF (ref 0–2)
SODIUM SERPL-SCNC: 136 MMOL/L (ref 136–145)
SP GR UR STRIP: 1.01 (ref 1–1.03)
TRIGL SERPL-MCNC: 86 MG/DL (ref 0–150)
TSH SERPL DL<=0.005 MIU/L-ACNC: 1.35 UIU/ML (ref 0.27–4.2)
URINALYSIS REFLEX: ABNORMAL
UROBILINOGEN UR STRIP-MCNC: 0.2 MG/DL (ref 0.2–1)
VLDLC SERPL CALC-MCNC: 15 MG/DL (ref 5–40)
WBC # BLD AUTO: 5.8 10*3/MM3 (ref 3.4–10.8)
WBC #/AREA URNS HPF: NORMAL /HPF (ref 0–5)

## 2025-01-24 ENCOUNTER — OFFICE VISIT (OUTPATIENT)
Dept: INTERNAL MEDICINE | Facility: CLINIC | Age: 70
End: 2025-01-24
Payer: MEDICARE

## 2025-01-24 VITALS
HEIGHT: 72 IN | OXYGEN SATURATION: 96 % | BODY MASS INDEX: 30.07 KG/M2 | HEART RATE: 79 BPM | SYSTOLIC BLOOD PRESSURE: 136 MMHG | WEIGHT: 222 LBS | DIASTOLIC BLOOD PRESSURE: 86 MMHG

## 2025-01-24 DIAGNOSIS — Z12.12 ENCOUNTER FOR COLORECTAL CANCER SCREENING: Primary | ICD-10-CM

## 2025-01-24 DIAGNOSIS — R06.09 EXERTIONAL DYSPNEA: ICD-10-CM

## 2025-01-24 DIAGNOSIS — Z12.11 ENCOUNTER FOR COLORECTAL CANCER SCREENING: Primary | ICD-10-CM

## 2025-01-24 DIAGNOSIS — S41.111A ARM LACERATION, RIGHT, INITIAL ENCOUNTER: ICD-10-CM

## 2025-01-24 DIAGNOSIS — E78.5 HYPERLIPIDEMIA, UNSPECIFIED HYPERLIPIDEMIA TYPE: ICD-10-CM

## 2025-01-24 DIAGNOSIS — I51.5 CARDIAC CALCIFICATION: ICD-10-CM

## 2025-01-24 PROCEDURE — 90471 IMMUNIZATION ADMIN: CPT | Performed by: INTERNAL MEDICINE

## 2025-01-24 PROCEDURE — 99214 OFFICE O/P EST MOD 30 MIN: CPT | Performed by: INTERNAL MEDICINE

## 2025-01-24 PROCEDURE — G0439 PPPS, SUBSEQ VISIT: HCPCS | Performed by: INTERNAL MEDICINE

## 2025-01-24 PROCEDURE — G2211 COMPLEX E/M VISIT ADD ON: HCPCS | Performed by: INTERNAL MEDICINE

## 2025-01-24 PROCEDURE — 90714 TD VACC NO PRESV 7 YRS+ IM: CPT | Performed by: INTERNAL MEDICINE

## 2025-01-24 PROCEDURE — 93000 ELECTROCARDIOGRAM COMPLETE: CPT | Performed by: INTERNAL MEDICINE

## 2025-01-24 PROCEDURE — 1170F FXNL STATUS ASSESSED: CPT | Performed by: INTERNAL MEDICINE

## 2025-01-24 PROCEDURE — 1126F AMNT PAIN NOTED NONE PRSNT: CPT | Performed by: INTERNAL MEDICINE

## 2025-01-24 RX ORDER — ATORVASTATIN CALCIUM 20 MG/1
20 TABLET, FILM COATED ORAL DAILY
Qty: 90 TABLET | Refills: 3 | Status: SHIPPED | OUTPATIENT
Start: 2025-01-24

## 2025-01-24 NOTE — PROGRESS NOTES
Subjective   The ABCs of the Annual Wellness Visit  Medicare Wellness Visit      Girish Greenberg is a 69 y.o. patient who presents for a Medicare Wellness Visit.    The following portions of the patient's history were reviewed and   updated as appropriate: allergies, current medications, past family history, past medical history, past social history, past surgical history, and problem list.    Compared to one year ago, the patient's physical   health is the same.  Compared to one year ago, the patient's mental   health is the same.    Recent Hospitalizations:  He was not admitted to the hospital during the last year.     Current Medical Providers:  Patient Care Team:  Regina Landis MD as PCP - General (Internal Medicine)  Regina Landis MD as PCP - Family Medicine  Slade Borrego MD as Consulting Physician (Urology)  Brian Sabillon MD as Consulting Physician (General Surgery)    Outpatient Medications Prior to Visit   Medication Sig Dispense Refill    albuterol (PROVENTIL HFA;VENTOLIN HFA) 108 (90 BASE) MCG/ACT inhaler Inhale 2 puffs Every 6 (Six) Hours As Needed for Shortness of Air. Indications: Chronic Obstructive Lung Disease      budesonide-formoterol (SYMBICORT) 160-4.5 MCG/ACT inhaler Inhale 2 puffs As Needed (SOA). Indications: Chronic Obstructive Lung Disease      Cholecalciferol (VITAMIN D PO) Take 1 capsule by mouth Daily. HOLDING FOR OR  Indications: Vitamin D deficiency      Coenzyme Q10 (CO Q 10 PO) Take 1 capsule by mouth Daily. HOLD FOR SURGERY      Cyanocobalamin (B-12 PO) Take 1 capsule by mouth Daily. HOLDING FOR OR  Indications: Vitamin B Deficiency      diphenhydrAMINE (BENADRYL) 25 MG tablet Take 2 tablets by mouth At Night As Needed for Allergies or Itching. Indications: Skin Reaction due to an Allergy      escitalopram (Lexapro) 10 MG tablet Take 1 tablet by mouth Daily. 90 tablet 3    levothyroxine (SYNTHROID, LEVOTHROID) 50 MCG tablet TAKE 1 TABLET BY MOUTH EVERY DAY 90 tablet 5     magnesium oxide (MAG-OX) 400 tablet tablet Take 1 tablet by mouth Daily. Indications: Disorder with Low Magnesium Levels      Multiple Vitamins-Minerals (b complex-C-E-zinc) tablet Take 1 tablet by mouth Daily. Indications: Other vitamin deficiencies      oxybutynin XL (DITROPAN-XL) 5 MG 24 hr tablet Take 1 tablet by mouth Daily.      Red Yeast Rice Extract (RED YEAST RICE PO) Take 1 capsule by mouth Daily. HOLD FOR SURGERY      TURMERIC CURCUMIN PO Take 1 capsule by mouth Daily. HOLD FOR SURGERY      atorvastatin (LIPITOR) 10 MG tablet Take 1 tablet by mouth Daily. 90 tablet 1    EPINEPHrine, Anaphylaxis, (ADRENALIN) 1 MG/ML injection Inject 0.3 mL into the appropriate muscle as directed by prescriber As Needed for Anaphylaxis. Indications: Life-Threatening Hypersensitivity Reaction      Hydrocort-Pramoxine, Perianal, (ANALPRAM-HC) 2.5-1 % rectal cream Insert  into the rectum 3 (Three) Times a Day. 30 g 2    naloxone (NARCAN) 4 MG/0.1ML nasal spray Call 911. Don't prime. [place] in 1 nostril for overdose. Repeat in 2-3 minutes in other nostril if no or minimal breathing/responsiveness. 2 each 0     No facility-administered medications prior to visit.     No opioid medication identified on active medication list. I have reviewed chart for other potential  high risk medication/s and harmful drug interactions in the elderly.      Aspirin is not on active medication list.  Aspirin use is not indicated based on review of current medical condition/s. Risk of harm outweighs potential benefits.  .    Patient Active Problem List   Diagnosis    Prostate cancer    Rib pain on left side    Obstructive sleep apnea syndrome    Osteoarthritis    Reactive depression    Bilateral low back pain without sciatica    Family history of colon cancer    Abnormal CT of the abdomen    History of prostate cancer    Abdominal pain, RLQ    Hypothyroidism (acquired)    Osteomyelitis, pelvic    Rash of back     Advance Care Planning Advance  "Directive is not on file.  ACP discussion was held with the patient during this visit. Patient does not have an advance directive, information provided.            Objective   Vitals:    25 0827   BP: 136/86   Pulse: 79   SpO2: 96%   Weight: 101 kg (222 lb)   Height: 182.9 cm (72\")   PainSc: 0-No pain       Estimated body mass index is 30.11 kg/m² as calculated from the following:    Height as of this encounter: 182.9 cm (72\").    Weight as of this encounter: 101 kg (222 lb).    BMI is >= 30 and <35. (Class 1 Obesity). The following options were offered after discussion;: weight loss educational material (shared in after visit summary), exercise counseling/recommendations, and nutrition counseling/recommendations           Does the patient have evidence of cognitive impairment? No  Lab Results   Component Value Date    CHLPL 259 (H) 01/10/2025    TRIG 86 01/10/2025    HDL 63 (H) 01/10/2025     (H) 01/10/2025    VLDL 15 01/10/2025                                                                                                Health  Risk Assessment    Smoking Status:  Social History     Tobacco Use   Smoking Status Never   Smokeless Tobacco Never     Alcohol Consumption:  Social History     Substance and Sexual Activity   Alcohol Use Not Currently    Alcohol/week: 2.0 standard drinks of alcohol    Types: 2 Cans of beer per week    Comment: 2 PER DAY       Fall Risk Screen  STEADI Fall Risk Assessment was completed, and patient is at LOW risk for falls.Assessment completed on:2025    Depression Screening   Little interest or pleasure in doing things? Not at all   Feeling down, depressed, or hopeless? Not at all   PHQ-2 Total Score 0      Health Habits and Functional and Cognitive Screenin/24/2025     8:29 AM   Functional & Cognitive Status   Do you have difficulty preparing food and eating? No   Do you have difficulty bathing yourself, getting dressed or grooming yourself? No   Do you have " difficulty using the toilet? No   Do you have difficulty moving around from place to place? No   Do you have trouble with steps or getting out of a bed or a chair? No   Dental Exam Up to date   Eye Exam Up to date   Do you need help using the phone?  No   Are you deaf or do you have serious difficulty hearing?  No   Do you need help to go to places out of walking distance? No   Do you need help shopping? No   Do you need help preparing meals?  No   Do you need help with housework?  No   Do you need help with laundry? No   Do you need help taking your medications? No   Do you need help managing money? No   Do you ever drive or ride in a car without wearing a seat belt? No   Have you felt unusual stress, anger or loneliness in the last month? No   Who do you live with? Spouse   If you need help, do you have trouble finding someone available to you? No   Have you been bothered in the last four weeks by sexual problems? No   Do you have difficulty concentrating, remembering or making decisions? No           Age-appropriate Screening Schedule:  Refer to the list below for future screening recommendations based on patient's age, sex and/or medical conditions. Orders for these recommended tests are listed in the plan section. The patient has been provided with a written plan.    Health Maintenance List  Health Maintenance   Topic Date Due    ZOSTER VACCINE (1 of 2) Never done    Hepatitis B (1 of 3 - Risk 3-dose series) Never done    COLORECTAL CANCER SCREENING  02/02/2024    COVID-19 Vaccine (3 - 2024-25 season) 01/26/2025 (Originally 9/1/2024)    LIPID PANEL  01/10/2026    ANNUAL WELLNESS VISIT  01/24/2026    BMI FOLLOWUP  01/24/2026    TDAP/TD VACCINES (4 - Td or Tdap) 09/04/2028    HEPATITIS C SCREENING  Completed    INFLUENZA VACCINE  Completed    Pneumococcal Vaccine 65+  Completed    HEMOGLOBIN A1C  Discontinued                                                                                                            "                                     CMS Preventative Services Quick Reference  Risk Factors Identified During Encounter  Immunizations Discussed/Encouraged: Shingrix and COVID19    The above risks/problems have been discussed with the patient.  Pertinent information has been shared with the patient in the After Visit Summary.  An After Visit Summary and PPPS were made available to the patient.    Follow Up:   Next Medicare Wellness visit to be scheduled in 1 year.         Additional E&M Note during same encounter follows:  Patient has additional, significant, and separately identifiable condition(s)/problem(s) that require work above and beyond the Medicare Wellness Visit     Chief Complaint  Annual Exam  H/o prostate cancer  H/o precancerous rectal poly  H/o fistula  Hyperlipdiemia  Hypothyroidism    Subjective   HPI  Girish is also being seen today for an annual adult preventative physical exam.  and Girish is also being seen today for additional medical problem/s. Patient w prev h/o fistula formation. Now w urine catheter changed monthly. Arlene f/u urology and doiong well. Has hyperlipidemia. Self d/c atorvastatin. Unsure why med was d/c possibly to try herbal or why med was d/c. Pulm nodules monitored annually by ct chest. Incidental finding coronary artery calcification on ct scan. Ldl c now 181.                    Objective   Vital Signs:  /86   Pulse 79   Ht 182.9 cm (72\")   Wt 101 kg (222 lb)   SpO2 96%   BMI 30.11 kg/m²   Physical Exam  Vitals and nursing note reviewed.   Constitutional:       Appearance: Normal appearance. He is well-developed.   HENT:      Head: Normocephalic and atraumatic.      Right Ear: Tympanic membrane and external ear normal.      Left Ear: Tympanic membrane and external ear normal.      Nose: Nose normal.      Mouth/Throat:      Mouth: Mucous membranes are moist.   Eyes:      Extraocular Movements: Extraocular movements intact.      Pupils: Pupils are equal, round, and " reactive to light.   Cardiovascular:      Rate and Rhythm: Normal rate and regular rhythm.      Pulses: Normal pulses.      Heart sounds: Normal heart sounds.   Pulmonary:      Effort: Pulmonary effort is normal. No respiratory distress.      Breath sounds: Normal breath sounds.   Abdominal:      General: Abdomen is flat.      Palpations: Abdomen is soft.   Musculoskeletal:         General: Normal range of motion.      Cervical back: Normal range of motion and neck supple.   Skin:     General: Skin is warm and dry.   Neurological:      General: No focal deficit present.      Mental Status: He is alert and oriented to person, place, and time.   Psychiatric:         Mood and Affect: Mood normal.         Behavior: Behavior normal.         Thought Content: Thought content normal.         Judgment: Judgment normal.       EKG for dyslipidemia. Sinus non spec  st changes. Unchanged prior.     The following data was reviewed by: Regina Landis MD on 01/24/2025:  Data reviewed : Consultant notes urology  Common labs          1/10/2025    09:53   Common Labs   Glucose 87    BUN 16    Creatinine 0.96    Sodium 136    Potassium 4.4    Chloride 99    Calcium 9.7    Total Protein 6.7    Albumin 4.3    Total Bilirubin 0.5    Alkaline Phosphatase 35    AST (SGOT) 20    ALT (SGPT) 17    WBC 5.80    Hemoglobin 15.8    Hematocrit 46.0    Platelets 331    Total Cholesterol 259    Triglycerides 86    HDL Cholesterol 63    LDL Cholesterol  181    PSA 0.779              Assessment and Plan Additional age appropriate preventative wellness advice topics were discussed during today's preventative wellness exam(some topics already addressed during AWV portion of the note above):    Physical Activity: Advised cardiovascular activity 150 minutes per week as tolerated. (example brisk walk for 30 minutes, 5 days a week).     Nutrition: Discussed nutrition plan with patient. Information shared in after visit summary. Goal is for a well balanced  diet to enhance overall health.     Healthy Weight: Discussed current and goal BMI with patient. Steps to attain this goal discussed. Information shared in after visit summary.           Encounter for colorectal cancer screening    Orders:    Ambulatory Referral For Screening Colonoscopy    Hyperlipidemia, unspecified hyperlipidemia type   Lipid abnormalities are worsening. Patient self d/c statin. (Possibly related to wife concerned of possible side effects?)He will restart given calcification on ct chest. He will be referred to cards to determine cardiac testing in this setting.     Plan:  Begin taking the following medication/s; atorvastatin 20 mg..      Discussed medication dosage, use, side effects, and goals of treatment in detail.    Counseled patient on lifestyle modifications to help control hyperlipidemia.   Advised patient to exercise for 150 minutes weekly. (30 minute brisk walk, 5 days a week for example)  Weight Loss encouraged  Referral to cardiologist    Patient Treatment Goals:   LDL goal is less than 70    Followup in 6 months.    Orders:    ECG 12 Lead    Ambulatory Referral to Cardiology    Comprehensive Metabolic Panel    Lipid Panel With LDL / HDL Ratio    CK    Exertional dyspnea    Orders:    ECG 12 Lead    Ambulatory Referral to Cardiology    Arm laceration, right, initial encounter    Orders:    Td Vaccine => 6yo PF (TDVAX) 2-2    Ambulatory Referral to Cardiology    Cardiac calcification    Orders:    Ambulatory Referral to Cardiology    Comprehensive Metabolic Panel    Lipid Panel With LDL / HDL Ratio    CK  -hypothyroid-tsh at goal. Cont synthroid   -hyperlip-atorvastatin 20  -coronary artery calcification- cards referral  - arm laceration-local wound care. Td today  -h/o prostate ca-f/u urology  -h/o fistula-monthly cath change. F/u urology  -hcm-to maintain all routine hcm. D/w patient healthy nutrtion and movement especially in setting of cardiac calcification and dyslipidemia. He is  ref for screening cscope.          I spent 52 minutes caring for Girish on this date of service. This time includes time spent by me in the following activities:preparing for the visit, reviewing tests, obtaining and/or reviewing a separately obtained history, performing a medically appropriate examination and/or evaluation , counseling and educating the patient/family/caregiver, ordering medications, tests, or procedures, referring and communicating with other health care professionals , documenting information in the medical record, and independently interpreting results and communicating that information with the patient/family/caregiver  Follow Up   Return in about 6 months (around 7/24/2025) for Recheck.  Patient was given instructions and counseling regarding his condition or for health maintenance advice. Please see specific information pulled into the AVS if appropriate.

## 2025-01-28 ENCOUNTER — TRANSCRIBE ORDERS (OUTPATIENT)
Dept: INTERNAL MEDICINE | Facility: CLINIC | Age: 70
End: 2025-01-28
Payer: COMMERCIAL

## 2025-01-28 DIAGNOSIS — Z13.6 ENCOUNTER FOR SCREENING FOR VASCULAR DISEASE: Primary | ICD-10-CM

## 2025-02-07 ENCOUNTER — OFFICE VISIT (OUTPATIENT)
Dept: CARDIOLOGY | Facility: CLINIC | Age: 70
End: 2025-02-07
Payer: MEDICARE

## 2025-02-07 VITALS
HEART RATE: 57 BPM | DIASTOLIC BLOOD PRESSURE: 70 MMHG | HEIGHT: 72 IN | WEIGHT: 229.6 LBS | BODY MASS INDEX: 31.1 KG/M2 | OXYGEN SATURATION: 98 % | SYSTOLIC BLOOD PRESSURE: 119 MMHG

## 2025-02-07 DIAGNOSIS — G47.33 OBSTRUCTIVE SLEEP APNEA SYNDROME: ICD-10-CM

## 2025-02-07 DIAGNOSIS — I25.10 CORONARY ARTERY CALCIFICATION SEEN ON CAT SCAN: Primary | ICD-10-CM

## 2025-02-07 DIAGNOSIS — R06.02 SHORTNESS OF BREATH: ICD-10-CM

## 2025-02-07 DIAGNOSIS — I25.10 CORONARY ARTERY DISEASE INVOLVING NATIVE CORONARY ARTERY OF NATIVE HEART WITHOUT ANGINA PECTORIS: ICD-10-CM

## 2025-02-07 DIAGNOSIS — E78.5 HYPERLIPIDEMIA LDL GOAL <70: ICD-10-CM

## 2025-02-07 DIAGNOSIS — I44.0 FIRST DEGREE AV BLOCK: ICD-10-CM

## 2025-02-07 RX ORDER — ASPIRIN 81 MG/1
81 TABLET ORAL DAILY
COMMUNITY

## 2025-02-07 RX ORDER — ATORVASTATIN CALCIUM 20 MG/1
80 TABLET, FILM COATED ORAL DAILY
Qty: 90 TABLET | Refills: 3 | Status: SHIPPED | OUTPATIENT
Start: 2025-02-07

## 2025-02-07 NOTE — PROGRESS NOTES
"[facility]      Patient Name: Girish Greenberg  :1955  Age: 69 y.o.  Encounter Provider:  Adeel Almonte MD      Chief Complaint:   Chief Complaint   Patient presents with    Hyperlipidemia         HPI  Girish Greenberg is a 69 y.o. male who presents today for evaluation of hyperlipidemia. Pt has a  history significant for history of prostate cancer, hypothyroidism, hyperlipidemia.  He works as a job  for [employer] and is fairly active walking up and down stairs.  He notes he gets more short of breath recently.  Does not have any chest pain.  Gets trace lower extremity edema the end of a long day but it resolves by the morning.  No orthopnea.  Recent LDL of 181, that he was not taking statin at that time.  Personal review of CT chest that he was getting follow-up lung nodules with extensive coronary artery calcifications, especially in LAD territory.  He has no known history of cardiac disease      The following portions of the patient's history were reviewed and updated as appropriate: allergies, current medications, past family history, past medical history, past social history, past surgical history and problem list.      Previous Cardiac Testing:  No recent    OBJECTIVE:   Vital Signs  Vitals:    25 0846   BP: 119/70   Pulse: 57   SpO2: 98%     Estimated body mass index is 31.14 kg/m² as calculated from the following:    Height as of this encounter: 182.9 cm (72\").    Weight as of this encounter: 104 kg (229 lb 9.6 oz).    Constitutional:       Appearance: Healthy appearance. Not in distress.   Neck:      Vascular: JVD normal.   Pulmonary:      Effort: Pulmonary effort is normal.      Breath sounds: Normal breath sounds. No wheezing. No rhonchi. No rales.   Cardiovascular:      PMI at left midclavicular line. Normal rate. Regular rhythm. Normal S1. Normal S2.       Murmurs: There is no murmur.      No gallop.  No click. No rub.   Pulses:     Intact distal " pulses.   Edema:     Peripheral edema absent.   Abdominal:      Palpations: Abdomen is soft.      Tenderness: There is no abdominal tenderness.   Musculoskeletal: Normal range of motion. Skin:     General: Skin is warm and dry.   Neurological:      General: No focal deficit present.      Mental Status: Alert and oriented to person, place and time.           ECG 12 Lead    Date/Time: 2/7/2025 9:50 AM  Performed by: Adeel Almonte MD    Authorized by: Adeel Almonte MD  Comparison: compared with previous ECG from 1/24/2025  Similar to previous ECG  Rhythm: sinus rhythm  Rate: normal  Conduction: 1st degree AV block  ST Segments: ST segments normal  T Waves: T waves normal  QRS axis: normal    Clinical impression: abnormal EKG          Lipid Panel          1/10/2025    09:53   Lipid Panel   Total Cholesterol 259    Triglycerides 86    HDL Cholesterol 63    VLDL Cholesterol 15    LDL Cholesterol  181         BUN   Date Value Ref Range Status   01/10/2025 16 8 - 23 mg/dL Final   06/19/2023 19 8 - 23 mg/dL Final     Creatinine   Date Value Ref Range Status   01/10/2025 0.96 0.76 - 1.27 mg/dL Final   12/15/2023 1.80 (H) 0.60 - 1.30 mg/dL Final     Comment:     Serial Number: [id number]:  [id number]   01/16/2018 0.8 0.6 - 1.3 mg/dL Final     Potassium   Date Value Ref Range Status   01/10/2025 4.4 3.5 - 5.2 mmol/L Final   06/19/2023 4.8 3.5 - 5.2 mmol/L Final     Comment:     Slight hemolysis detected by analyzer. Results may be affected.     ALT (SGPT)   Date Value Ref Range Status   01/10/2025 17 1 - 41 U/L Final   06/19/2023 22 1 - 41 U/L Final     AST (SGOT)   Date Value Ref Range Status   01/10/2025 20 1 - 40 U/L Final   06/19/2023 22 1 - 40 U/L Final     Comment:     Slight hemolysis detected by analyzer. Results may be affected.           ASSESSMENT:      Diagnosis Plan   1. Coronary artery calcification seen on CAT scan  Stress Test With Myocardial Perfusion One Day    Lipid Panel    Adult Transthoracic Echo  Complete w/ Color, Spectral and Contrast if Necessary Per Protocol      2. Hyperlipidemia LDL goal <70        3. Obstructive sleep apnea syndrome        4. Coronary artery disease involving native coronary artery of native heart without angina pectoris        5. First degree AV block              PLAN OF CARE:     Coronary artery disease  Extensive coronary artery calcification, LAD,  seen on CT chest.  Patient does note some dyspnea on exertion but no chest pain.  Will obtain echocardiogram and exercise SPECT to better risk stratify  Start aspirin 81 mg daily.  Lipid management as below  Hyperlipidemia: .  Increase atorvastatin to 80 mg, okay to continue CoQ10. Repeat lipid panel in 3 months. He will notify me if cramping.   MICHAEL  First-degree AV block: stable. No signs of high grade block.     RTC in 3 months.          Discharge Medications            Accurate as of February 7, 2025  9:51 AM. If you have any questions, ask your nurse or doctor.                Changes to Medications        Instructions Start Date   atorvastatin 20 MG tablet  Commonly known as: LIPITOR  What changed: how much to take  Changed by: Adeel Almonte   80 mg, Oral, Daily             Continue These Medications        Instructions Start Date   albuterol sulfate  (90 Base) MCG/ACT inhaler  Commonly known as: PROVENTIL HFA;VENTOLIN HFA;PROAIR HFA   2 puffs, Every 6 Hours PRN      aspirin 81 MG EC tablet   81 mg, Oral, Daily      b complex-C-E-zinc tablet   1 tablet, Daily      B-12 PO   1 capsule, Daily      budesonide-formoterol 160-4.5 MCG/ACT inhaler  Commonly known as: SYMBICORT   2 puffs, As Needed      CO Q 10 PO   1 capsule, Daily      diphenhydrAMINE 25 MG tablet  Commonly known as: BENADRYL   50 mg, Nightly PRN      EPINEPHrine (Anaphylaxis) 1 MG/ML injection  Commonly known as: ADRENALIN   0.3 mg, As Needed      escitalopram 10 MG tablet  Commonly known as: Lexapro   10 mg, Oral, Daily      Hydrocort-Pramoxine  (Perianal) 2.5-1 % rectal cream  Commonly known as: ANALPRAM-HC   Rectal, 3 Times Daily      levothyroxine 50 MCG tablet  Commonly known as: SYNTHROID, LEVOTHROID   TAKE 1 TABLET BY MOUTH EVERY DAY      magnesium oxide 400 tablet tablet  Commonly known as: MAG-OX   1 tablet, Daily      Narcan 4 MG/0.1ML nasal spray  Generic drug: naloxone   Call 911. Don't prime. [place] in 1 nostril for overdose. Repeat in 2-3 minutes in other nostril if no or minimal breathing/responsiveness.      oxybutynin XL 5 MG 24 hr tablet  Commonly known as: DITROPAN-XL   1 tablet, Daily      RED YEAST RICE PO   1 capsule, Daily      TURMERIC CURCUMIN PO   1 capsule, Daily      VITAMIN D PO   1 capsule, Daily               Thank you for allowing me to participate in the care of your patient,      Sincerely,   Adeel Almonte MD  [facility]  02/07/25  09:51 EST

## 2025-02-21 ENCOUNTER — TRANSCRIBE ORDERS (OUTPATIENT)
Dept: ADMINISTRATIVE | Facility: HOSPITAL | Age: 70
End: 2025-02-21
Payer: COMMERCIAL

## 2025-02-21 DIAGNOSIS — R97.21 INCREASING PSA LEVEL AFTER TREATMENT FOR PROSTATE CANCER: ICD-10-CM

## 2025-02-21 DIAGNOSIS — C61 PROSTATE CANCER: Primary | ICD-10-CM

## 2025-02-27 ENCOUNTER — TELEPHONE (OUTPATIENT)
Dept: CARDIOLOGY | Facility: CLINIC | Age: 70
End: 2025-02-27
Payer: COMMERCIAL

## 2025-02-28 ENCOUNTER — LAB (OUTPATIENT)
Dept: LAB | Facility: HOSPITAL | Age: 70
End: 2025-02-28
Payer: COMMERCIAL

## 2025-02-28 ENCOUNTER — HOSPITAL ENCOUNTER (OUTPATIENT)
Dept: CARDIOLOGY | Facility: HOSPITAL | Age: 70
Discharge: HOME | End: 2025-02-28
Payer: COMMERCIAL

## 2025-02-28 ENCOUNTER — PREP FOR SURGERY (OUTPATIENT)
Dept: OTHER | Facility: HOSPITAL | Age: 70
End: 2025-02-28
Payer: COMMERCIAL

## 2025-02-28 VITALS
DIASTOLIC BLOOD PRESSURE: 84 MMHG | SYSTOLIC BLOOD PRESSURE: 130 MMHG | WEIGHT: 229 LBS | HEIGHT: 72 IN | BODY MASS INDEX: 31.02 KG/M2

## 2025-02-28 VITALS — HEIGHT: 72 IN | WEIGHT: 229.28 LBS | BODY MASS INDEX: 31.05 KG/M2

## 2025-02-28 DIAGNOSIS — Z80.0 FAMILY HISTORY OF COLON CANCER: ICD-10-CM

## 2025-02-28 DIAGNOSIS — I25.10 CORONARY ARTERY CALCIFICATION SEEN ON CAT SCAN: ICD-10-CM

## 2025-02-28 DIAGNOSIS — Z86.0101 HISTORY OF ADENOMATOUS POLYP OF COLON: Primary | ICD-10-CM

## 2025-02-28 LAB
AORTIC ARCH: 3.5 CM
ASCENDING AORTA: 4 CM
AV MEAN PRESS GRAD SYS DOP V1V2: 2 MMHG
AV VMAX SYS DOP: 98.9 CM/SEC
BH CV ECHO MEAS - ACS: 2.05 CM
BH CV ECHO MEAS - AO MAX PG: 3.9 MMHG
BH CV ECHO MEAS - AO ROOT DIAM: 3.4 CM
BH CV ECHO MEAS - AO V2 VTI: 24.4 CM
BH CV ECHO MEAS - AVA(I,D): 2.5 CM2
BH CV ECHO MEAS - EDV(CUBED): 59.3 ML
BH CV ECHO MEAS - EDV(MOD-SP2): 140 ML
BH CV ECHO MEAS - EDV(MOD-SP4): 142 ML
BH CV ECHO MEAS - EF(MOD-SP2): 58.6 %
BH CV ECHO MEAS - EF(MOD-SP4): 57 %
BH CV ECHO MEAS - ESV(CUBED): 17.7 ML
BH CV ECHO MEAS - ESV(MOD-SP2): 58 ML
BH CV ECHO MEAS - ESV(MOD-SP4): 61 ML
BH CV ECHO MEAS - FS: 33.2 %
BH CV ECHO MEAS - IVS/LVPW: 0.95 CM
BH CV ECHO MEAS - IVSD: 1 CM
BH CV ECHO MEAS - LAT PEAK E' VEL: 8.5 CM/SEC
BH CV ECHO MEAS - LV DIASTOLIC VOL/BSA (35-75): 62.9 CM2
BH CV ECHO MEAS - LV MASS(C)D: 125.9 GRAMS
BH CV ECHO MEAS - LV MAX PG: 2.41 MMHG
BH CV ECHO MEAS - LV MEAN PG: 1 MMHG
BH CV ECHO MEAS - LV SYSTOLIC VOL/BSA (12-30): 27 CM2
BH CV ECHO MEAS - LV V1 MAX: 77.7 CM/SEC
BH CV ECHO MEAS - LV V1 VTI: 17.5 CM
BH CV ECHO MEAS - LVIDD: 3.9 CM
BH CV ECHO MEAS - LVIDS: 2.6 CM
BH CV ECHO MEAS - LVOT AREA: 3.5 CM2
BH CV ECHO MEAS - LVOT DIAM: 2.11 CM
BH CV ECHO MEAS - LVPWD: 1.05 CM
BH CV ECHO MEAS - MED PEAK E' VEL: 8.1 CM/SEC
BH CV ECHO MEAS - MV A DUR: 0.14 SEC
BH CV ECHO MEAS - MV A MAX VEL: 62.2 CM/SEC
BH CV ECHO MEAS - MV DEC SLOPE: 308.6 CM/SEC2
BH CV ECHO MEAS - MV DEC TIME: 0.21 SEC
BH CV ECHO MEAS - MV E MAX VEL: 77.8 CM/SEC
BH CV ECHO MEAS - MV E/A: 1.25
BH CV ECHO MEAS - MV MAX PG: 2.29 MMHG
BH CV ECHO MEAS - MV MEAN PG: 0.87 MMHG
BH CV ECHO MEAS - MV P1/2T: 74.5 MSEC
BH CV ECHO MEAS - MV V2 VTI: 24.8 CM
BH CV ECHO MEAS - MVA(P1/2T): 3 CM2
BH CV ECHO MEAS - MVA(VTI): 2.46 CM2
BH CV ECHO MEAS - PA ACC TIME: 0.16 SEC
BH CV ECHO MEAS - PA V2 MAX: 86.9 CM/SEC
BH CV ECHO MEAS - PULM A REVS DUR: 0.11 SEC
BH CV ECHO MEAS - PULM A REVS VEL: 27.3 CM/SEC
BH CV ECHO MEAS - PULM DIAS VEL: 28.6 CM/SEC
BH CV ECHO MEAS - PULM S/D: 2.24
BH CV ECHO MEAS - PULM SYS VEL: 64.1 CM/SEC
BH CV ECHO MEAS - QP/QS: 0.67
BH CV ECHO MEAS - RAP SYSTOLE: 3 MMHG
BH CV ECHO MEAS - RV MAX PG: 1.14 MMHG
BH CV ECHO MEAS - RV V1 MAX: 53.4 CM/SEC
BH CV ECHO MEAS - RV V1 VTI: 11.9 CM
BH CV ECHO MEAS - RVOT DIAM: 2.09 CM
BH CV ECHO MEAS - SV(LVOT): 61.1 ML
BH CV ECHO MEAS - SV(MOD-SP2): 82 ML
BH CV ECHO MEAS - SV(MOD-SP4): 81 ML
BH CV ECHO MEAS - SV(RVOT): 41 ML
BH CV ECHO MEAS - SVI(LVOT): 27.1 ML/M2
BH CV ECHO MEAS - SVI(MOD-SP2): 36.3 ML/M2
BH CV ECHO MEAS - SVI(MOD-SP4): 35.9 ML/M2
BH CV ECHO MEAS - TAPSE (>1.6): 2.42 CM
BH CV ECHO MEASUREMENTS AVERAGE E/E' RATIO: 9.37
BH CV NUCLEAR PRIOR STUDY: 2
BH CV REST NUCLEAR ISOTOPE DOSE: 10.5 MCI
BH CV STRESS BP STAGE 1: NORMAL
BH CV STRESS BP STAGE 2: NORMAL
BH CV STRESS DURATION MIN STAGE 1: 3
BH CV STRESS DURATION MIN STAGE 2: 3
BH CV STRESS DURATION SEC STAGE 1: 0
BH CV STRESS DURATION SEC STAGE 2: 30
BH CV STRESS GRADE STAGE 1: 10
BH CV STRESS GRADE STAGE 2: 12
BH CV STRESS HR STAGE 1: 105
BH CV STRESS HR STAGE 2: 130
BH CV STRESS METS STAGE 1: 5
BH CV STRESS METS STAGE 2: 7.5
BH CV STRESS NUCLEAR ISOTOPE DOSE: 33.9 MCI
BH CV STRESS PROTOCOL 1: NORMAL
BH CV STRESS RECOVERY BP: NORMAL MMHG
BH CV STRESS RECOVERY HR: 77 BPM
BH CV STRESS SPEED STAGE 1: 1.7
BH CV STRESS SPEED STAGE 2: 2.5
BH CV STRESS STAGE 1: 1
BH CV STRESS STAGE 2: 2
BH CV XLRA - RV BASE: 3.6 CM
BH CV XLRA - RV LENGTH: 10.3 CM
BH CV XLRA - RV MID: 2.8 CM
BH CV XLRA - TDI S': 11.6 CM/SEC
CHOLEST SERPL-MCNC: 203 MG/DL (ref 0–200)
HDLC SERPL-MCNC: 76 MG/DL (ref 40–60)
LDLC SERPL CALC-MCNC: 119 MG/DL (ref 0–100)
LDLC/HDLC SERPL: 1.55 {RATIO}
LEFT ATRIUM VOLUME INDEX: 27.7 ML/M2
LV EF BIPLANE MOD: 54.6 %
MAXIMAL PREDICTED HEART RATE: 151 BPM
PERCENT MAX PREDICTED HR: 86.09 %
SINUS: 3.5 CM
SPECT HRT GATED+EF W RNC IV: 63 %
STJ: 2.9 CM
STRESS BASELINE BP: NORMAL MMHG
STRESS BASELINE HR: 63 BPM
STRESS PERCENT HR: 101 %
STRESS POST ESTIMATED WORKLOAD: 8 METS
STRESS POST EXERCISE DUR MIN: 6 MIN
STRESS POST EXERCISE DUR SEC: 30 SEC
STRESS POST PEAK BP: NORMAL MMHG
STRESS POST PEAK HR: 130 BPM
STRESS TARGET HR: 128 BPM
TRIGL SERPL-MCNC: 45 MG/DL (ref 0–150)
VLDLC SERPL-MCNC: 8 MG/DL (ref 5–40)

## 2025-02-28 PROCEDURE — 93306 TTE W/DOPPLER COMPLETE: CPT

## 2025-02-28 PROCEDURE — 36415 COLL VENOUS BLD VENIPUNCTURE: CPT

## 2025-02-28 PROCEDURE — A9502 TC99M TETROFOSMIN: HCPCS | Performed by: STUDENT IN AN ORGANIZED HEALTH CARE EDUCATION/TRAINING PROGRAM

## 2025-02-28 PROCEDURE — 93017 CV STRESS TEST TRACING ONLY: CPT

## 2025-02-28 PROCEDURE — 80061 LIPID PANEL: CPT

## 2025-02-28 PROCEDURE — 78452 HT MUSCLE IMAGE SPECT MULT: CPT

## 2025-02-28 PROCEDURE — 34310000005 TECHNETIUM TETROFOSMIN KIT: Performed by: STUDENT IN AN ORGANIZED HEALTH CARE EDUCATION/TRAINING PROGRAM

## 2025-02-28 RX ADMIN — TETROFOSMIN 1 DOSE: 1.38 INJECTION, POWDER, LYOPHILIZED, FOR SOLUTION INTRAVENOUS at 08:38

## 2025-02-28 RX ADMIN — TETROFOSMIN 1 DOSE: 1.38 INJECTION, POWDER, LYOPHILIZED, FOR SOLUTION INTRAVENOUS at 09:35

## 2025-03-05 ENCOUNTER — TRANSCRIBE ORDERS (OUTPATIENT)
Dept: ADMINISTRATIVE | Facility: HOSPITAL | Age: 70
End: 2025-03-05
Payer: COMMERCIAL

## 2025-03-05 DIAGNOSIS — R97.21 INCREASING PSA LEVEL AFTER TREATMENT FOR PROSTATE CANCER: ICD-10-CM

## 2025-03-05 DIAGNOSIS — C61 PROSTATE CANCER: Primary | ICD-10-CM

## 2025-03-18 ENCOUNTER — TELEPHONE (OUTPATIENT)
Dept: INTERNAL MEDICINE | Facility: CLINIC | Age: 70
End: 2025-03-18
Payer: COMMERCIAL

## 2025-03-18 NOTE — TELEPHONE ENCOUNTER
Caller: Girish Greenberg    Relationship: Self    Best call back number: [phone]         Who are you requesting to speak with (clinical staff, provider,  specific staff member): JOSETTE        What was the call regarding: NEEDS INFO ON COLONOSCOPY

## 2025-03-20 ENCOUNTER — HOSPITAL ENCOUNTER (OUTPATIENT)
Dept: PET IMAGING | Facility: HOSPITAL | Age: 70
Discharge: HOME | End: 2025-03-20
Payer: COMMERCIAL

## 2025-03-20 DIAGNOSIS — R97.21 INCREASING PSA LEVEL AFTER TREATMENT FOR PROSTATE CANCER: ICD-10-CM

## 2025-03-20 DIAGNOSIS — C61 PROSTATE CANCER: ICD-10-CM

## 2025-03-20 PROCEDURE — 78815 PET IMAGE W/CT SKULL-THIGH: CPT

## 2025-03-20 PROCEDURE — A9608 FLOTUFOLASTAT F-18 296-5846 MBQ/ML SOLUTION: HCPCS | Performed by: STUDENT IN AN ORGANIZED HEALTH CARE EDUCATION/TRAINING PROGRAM

## 2025-03-20 PROCEDURE — 34310000005 FLOTUFOLASTAT F-18 296-5846 MBQ/ML SOLUTION: Performed by: STUDENT IN AN ORGANIZED HEALTH CARE EDUCATION/TRAINING PROGRAM

## 2025-03-20 RX ADMIN — FLOTUFOLASTAT F-18 1 DOSE: 158 INJECTION INTRAVENOUS at 13:19

## 2025-04-24 ENCOUNTER — ANESTHESIA EVENT (OUTPATIENT)
Dept: PERIOP | Facility: HOSPITAL | Age: 70
End: 2025-04-24
Payer: MEDICARE

## 2025-04-25 ENCOUNTER — HOSPITAL ENCOUNTER (OUTPATIENT)
Facility: HOSPITAL | Age: 70
Setting detail: HOSPITAL OUTPATIENT SURGERY
Discharge: HOME | End: 2025-04-25
Attending: SURGERY | Admitting: SURGERY
Payer: MEDICARE

## 2025-04-25 ENCOUNTER — ANESTHESIA (OUTPATIENT)
Dept: PERIOP | Facility: HOSPITAL | Age: 70
End: 2025-04-25
Payer: MEDICARE

## 2025-04-25 VITALS
WEIGHT: 206 LBS | OXYGEN SATURATION: 98 % | SYSTOLIC BLOOD PRESSURE: 144 MMHG | DIASTOLIC BLOOD PRESSURE: 85 MMHG | BODY MASS INDEX: 27.9 KG/M2 | HEART RATE: 66 BPM | HEIGHT: 72 IN | TEMPERATURE: 98 F | RESPIRATION RATE: 19 BRPM

## 2025-04-25 PROCEDURE — 25810000003 LACTATED RINGERS PER 1000 ML: Performed by: NURSE ANESTHETIST, CERTIFIED REGISTERED

## 2025-04-25 PROCEDURE — S0260 H&P FOR SURGERY: HCPCS | Performed by: SURGERY

## 2025-04-25 PROCEDURE — G0105 COLORECTAL SCRN; HI RISK IND: HCPCS | Performed by: SURGERY

## 2025-04-25 PROCEDURE — 25010000002 PROPOFOL 200 MG/20ML EMULSION: Performed by: NURSE ANESTHETIST, CERTIFIED REGISTERED

## 2025-04-25 RX ORDER — PROPOFOL 10 MG/ML
INJECTION, EMULSION INTRAVENOUS AS NEEDED
Status: DISCONTINUED | OUTPATIENT
Start: 2025-04-25 | End: 2025-04-25 | Stop reason: SURG

## 2025-04-25 RX ORDER — SODIUM CHLORIDE, SODIUM LACTATE, POTASSIUM CHLORIDE, CALCIUM CHLORIDE 600; 310; 30; 20 MG/100ML; MG/100ML; MG/100ML; MG/100ML
100 INJECTION, SOLUTION INTRAVENOUS ONCE
Status: DISCONTINUED | OUTPATIENT
Start: 2025-04-25 | End: 2025-04-25 | Stop reason: HOSPADM

## 2025-04-25 RX ORDER — SODIUM CHLORIDE 0.9 % (FLUSH) 0.9 %
10 SYRINGE (ML) INJECTION EVERY 12 HOURS SCHEDULED
Status: DISCONTINUED | OUTPATIENT
Start: 2025-04-25 | End: 2025-04-25 | Stop reason: HOSPADM

## 2025-04-25 RX ORDER — LIDOCAINE HYDROCHLORIDE 10 MG/ML
0.5 INJECTION, SOLUTION EPIDURAL; INFILTRATION; INTRACAUDAL; PERINEURAL ONCE AS NEEDED
Status: DISCONTINUED | OUTPATIENT
Start: 2025-04-25 | End: 2025-04-25 | Stop reason: HOSPADM

## 2025-04-25 RX ORDER — ONDANSETRON 2 MG/ML
4 INJECTION INTRAMUSCULAR; INTRAVENOUS ONCE AS NEEDED
Status: DISCONTINUED | OUTPATIENT
Start: 2025-04-25 | End: 2025-04-25 | Stop reason: HOSPADM

## 2025-04-25 RX ORDER — SODIUM CHLORIDE, SODIUM LACTATE, POTASSIUM CHLORIDE, CALCIUM CHLORIDE 600; 310; 30; 20 MG/100ML; MG/100ML; MG/100ML; MG/100ML
9 INJECTION, SOLUTION INTRAVENOUS CONTINUOUS PRN
Status: DISCONTINUED | OUTPATIENT
Start: 2025-04-25 | End: 2025-04-25 | Stop reason: HOSPADM

## 2025-04-25 RX ORDER — SODIUM CHLORIDE 9 MG/ML
40 INJECTION, SOLUTION INTRAVENOUS AS NEEDED
Status: DISCONTINUED | OUTPATIENT
Start: 2025-04-25 | End: 2025-04-25 | Stop reason: HOSPADM

## 2025-04-25 RX ORDER — SODIUM CHLORIDE 0.9 % (FLUSH) 0.9 %
10 SYRINGE (ML) INJECTION AS NEEDED
Status: DISCONTINUED | OUTPATIENT
Start: 2025-04-25 | End: 2025-04-25 | Stop reason: HOSPADM

## 2025-04-25 RX ADMIN — PROPOFOL 20 MG: 10 INJECTION, EMULSION INTRAVENOUS at 09:18

## 2025-04-25 RX ADMIN — SODIUM CHLORIDE, POTASSIUM CHLORIDE, SODIUM LACTATE AND CALCIUM CHLORIDE: 600; 310; 30; 20 INJECTION, SOLUTION INTRAVENOUS at 09:08

## 2025-04-25 RX ADMIN — PROPOFOL 20 MG: 10 INJECTION, EMULSION INTRAVENOUS at 09:20

## 2025-04-25 RX ADMIN — PROPOFOL 80 MG: 10 INJECTION, EMULSION INTRAVENOUS at 09:12

## 2025-04-25 RX ADMIN — PROPOFOL 70 MG: 10 INJECTION, EMULSION INTRAVENOUS at 08:46

## 2025-04-25 RX ADMIN — PROPOFOL 20 MG: 10 INJECTION, EMULSION INTRAVENOUS at 09:16

## 2025-04-25 RX ADMIN — PROPOFOL 20 MG: 10 INJECTION, EMULSION INTRAVENOUS at 09:13

## 2025-04-25 NOTE — OP NOTE
PREOPERATIVE DIAGNOSIS:  High risk screening secondary to personal history of tubulovillous polyp and family history of colon cancer-mother    POSTOPERATIVE DIAGNOSIS AND FINDINGS:  Diverticulosis    PROCEDURE:  Colonoscopy to cecum     SURGEON:  Gordy Orellaan MD    ANESTHESIA:  MAC    SPECIMEN(S):  none    DESCRIPTION:  In decubitus position digital rectal exam was normal. Colonoscope inserted under direct visualization of lumen to cecum confirmed by visualization of ileocecal valve and appendiceal orifice.  Scope slowly withdrawn circumferentially examining all mucosal surfaces.  Bowel preparation was good.  There were no mucosal abnormalities.  Diverticulosis was noted.  Tolerated well.    RECOMMENDATION FOR FUTURE SURVEILLANCE:  5 years    Gordy Orellana M.D.

## 2025-04-25 NOTE — ANESTHESIA PREPROCEDURE EVALUATION
Anesthesia Evaluation     Patient summary reviewed and Nursing notes reviewed   history of anesthetic complications:  prolonged sedation  NPO Solid Status: > 8 hours  NPO Liquid Status: > 4 hours           Airway   Mallampati: II  TM distance: >3 FB  Neck ROM: full  No difficulty expected  Dental - normal exam     Pulmonary - negative pulmonary ROS and normal exam    breath sounds clear to auscultation  (-) COPD, asthma, sleep apnea  Cardiovascular - negative cardio ROS and normal exam  Exercise tolerance: good (4-7 METS)    ECG reviewed  Rhythm: regular  Rate: normal      ROS comment: EKG 2/7/25:  Authorized by: Adeel Almonte MD  Comparison: compared with previous ECG from 1/24/2025  Similar to previous ECG  Rhythm: sinus rhythm  Rate: normal  Conduction: 1st degree AV block  ST Segments: ST segments normal  T Waves: T waves normal  QRS axis: normal     Clinical impression: abnormal EKG    ECHO 2/28/25:  Interpretation Summary  ·  Left ventricular systolic function is normal. Calculated left ventricular EF = 54.6%  ·  Left ventricular diastolic function was normal.  ·  Normal right ventricular size and function  ·  Mild dilation of the ascending aorta is present measuring 4.0 cm    STRESS TEST 2/28/25:  Interpretation Summary  ·  Myocardial perfusion imaging indicates a normal myocardial perfusion study with no evidence of ischemia. Impressions are consistent with a low risk study.  ·  Left ventricular ejection fraction is normal (Calculated EF = 63%).  ·  There is no prior study available for comparison.    Neuro/Psych- negative ROS  (-) psychiatric history  GI/Hepatic/Renal/Endo    (+) GERD (rare), thyroid problem hypothyroidism    ROS Comment: uretrostomies    Musculoskeletal     (+) back pain      ROS comment: NEVIN  Abdominal  - normal exam   Substance History      OB/GYN          Other   arthritis,   history of cancer (prostate) remission                      Anesthesia Plan    ASA 2     MAC     intravenous  induction     Anesthetic plan, risks, benefits, and alternatives have been provided, discussed and informed consent has been obtained with: patient.  Pre-procedure education provided  Use of blood products discussed with patient  Consented to blood products.    Plan discussed with CRNA.        CODE STATUS:

## 2025-04-25 NOTE — H&P
CC: Surveillance    HPI: 69-year-old gentleman family history of colon cancer in mother and personal history of tubulovillous polyp in 2022 presents for surveillance colonoscopy.    PMH, PSH, MEDS AND ALLERGIES reviewed and reconciled in  EPIC    PHYSICAL EXAM:  Constitutional:  awake, alert, no acute distress  VS: Blood pressure 122/77.  Heart rate 71.  Respiratory rate 12.  Temperature 97.8.  Respiratory:  normal inspiratory effort  Cardiovascular: regular rate  Gastrointestinal: Soft    ROS:  relevant systems negative other than any presenting complaints    ASSESSMENT:    Surveillance    PLAN:  Colonoscopy    Gordy Orellana M.D.

## 2025-04-25 NOTE — ANESTHESIA POSTPROCEDURE EVALUATION
Patient: Girish Greenberg    Procedure Summary       Date: 04/25/25 Room / Location: [facility] ENDOSCOPY 2 /  [facility]    Anesthesia Start: 0908 Anesthesia Stop: 0930    Procedure: COLONOSCOPY Diagnosis:       History of adenomatous polyp of colon      Family history of colon cancer      (History of adenomatous polyp of colon [Z86.0101])      (Family history of colon cancer [Z80.0])    Surgeons: Gordy Orellana MD Provider: Samantha Segura CRNA    Anesthesia Type: MAC ASA Status: 2            Anesthesia Type: MAC    Vitals  Vitals Value Taken Time   /85 04/25/25 10:00   Temp 98 °F (36.7 °C) 04/25/25 09:30   Pulse 66 04/25/25 10:06   Resp 19 04/25/25 09:30   SpO2 96 % 04/25/25 10:06   Vitals shown include unfiled device data.        Post Anesthesia Care and Evaluation    Patient location during evaluation: PHASE II  Patient participation: complete - patient participated  Level of consciousness: awake and alert  Pain score: 0  Pain management: adequate    Airway patency: patent  Anesthetic complications: No anesthetic complications  PONV Status: none  Cardiovascular status: acceptable  Respiratory status: acceptable  Hydration status: acceptable

## 2025-05-05 RX ORDER — LEVOTHYROXINE SODIUM 50 UG/1
50 TABLET ORAL DAILY
Qty: 90 TABLET | Refills: 5 | Status: SHIPPED | OUTPATIENT
Start: 2025-05-05

## 2025-05-16 ENCOUNTER — OFFICE VISIT (OUTPATIENT)
Dept: CARDIOLOGY | Age: 70
End: 2025-05-16
Payer: MEDICARE

## 2025-05-16 VITALS
SYSTOLIC BLOOD PRESSURE: 138 MMHG | WEIGHT: 207 LBS | OXYGEN SATURATION: 99 % | BODY MASS INDEX: 28.04 KG/M2 | HEIGHT: 72 IN | DIASTOLIC BLOOD PRESSURE: 82 MMHG

## 2025-05-16 DIAGNOSIS — I25.10 CORONARY ARTERY CALCIFICATION SEEN ON CAT SCAN: Primary | ICD-10-CM

## 2025-05-16 DIAGNOSIS — E78.5 HYPERLIPIDEMIA LDL GOAL <70: ICD-10-CM

## 2025-05-16 DIAGNOSIS — I44.0 FIRST DEGREE AV BLOCK: ICD-10-CM

## 2025-05-16 DIAGNOSIS — G47.33 OBSTRUCTIVE SLEEP APNEA SYNDROME: ICD-10-CM

## 2025-05-16 NOTE — PROGRESS NOTES
"[facility]      Patient Name: Girish Greenberg  :1955  Age: 69 y.o.  Encounter Provider:  Adeel Almonte MD      Chief Complaint:   Chief Complaint   Patient presents with    Coronary artery calcification seen on CAT scan     Patient is in the office today for his 3 month follow up appointment.          HPI  Girish Greenberg is a 69 y.o. male who presents today for follow-up. Pt has a  history significant for history of coronary artery disease noted on CT scan, prostate cancer, hypothyroidism, hyperlipidemia.  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      At last visit his LDL was 81 and I recommend increased dose of statin.  He is to focus on lifestyle changes.  He is lost 20 pounds and is much more active.  He states he feels great.  He is not taking statin.  His most recent LDL cholesterol was 120.  He is not interested in the cholesterol medicine at this time.  He has no cardiovascular complaints today      The following portions of the patient's history were reviewed and updated as appropriate: allergies, current medications, past family history, past medical history, past social history, past surgical history and problem list.      Previous Cardiac Testing:  Normal echocardiogram and stress test 2025    OBJECTIVE:   Vital Signs  Vitals:    25 0826   BP: 138/82   SpO2: 99%       Estimated body mass index is 28.07 kg/m² as calculated from the following:    Height as of this encounter: 182.9 cm (72\").    Weight as of this encounter: 93.9 kg (207 lb).    Constitutional:       Appearance: Healthy appearance. Not in distress.   Neck:      Vascular: JVD normal.   Pulmonary:      Effort: Pulmonary effort is normal.      Breath sounds: Normal breath sounds. No wheezing. No rhonchi. No rales.   Cardiovascular:      PMI at left midclavicular line. Normal rate. Regular rhythm. Normal S1. Normal S2.       Murmurs: There is no " murmur.      No gallop.  No click. No rub.   Pulses:     Intact distal pulses.   Edema:     Peripheral edema absent.   Abdominal:      Palpations: Abdomen is soft.      Tenderness: There is no abdominal tenderness.   Musculoskeletal: Normal range of motion. Skin:     General: Skin is warm and dry.   Neurological:      General: No focal deficit present.      Mental Status: Alert and oriented to person, place and time.       Procedures    Lipid Panel          1/10/2025    09:53 2/28/2025    07:18   Lipid Panel   Total Cholesterol  203    Total Cholesterol 259     Triglycerides 86  45    HDL Cholesterol 63  76    VLDL Cholesterol 15  8    LDL Cholesterol  181  119    LDL/HDL Ratio  1.55         BUN   Date Value Ref Range Status   01/10/2025 16 8 - 23 mg/dL Final   06/19/2023 19 8 - 23 mg/dL Final     Creatinine   Date Value Ref Range Status   01/10/2025 0.96 0.76 - 1.27 mg/dL Final   12/15/2023 1.80 (H) 0.60 - 1.30 mg/dL Final     Comment:     Serial Number: [id number]:  [id number]   01/16/2018 0.8 0.6 - 1.3 mg/dL Final     Potassium   Date Value Ref Range Status   01/10/2025 4.4 3.5 - 5.2 mmol/L Final   06/19/2023 4.8 3.5 - 5.2 mmol/L Final     Comment:     Slight hemolysis detected by analyzer. Results may be affected.     ALT (SGPT)   Date Value Ref Range Status   01/10/2025 17 1 - 41 U/L Final   06/19/2023 22 1 - 41 U/L Final     AST (SGOT)   Date Value Ref Range Status   01/10/2025 20 1 - 40 U/L Final   06/19/2023 22 1 - 40 U/L Final     Comment:     Slight hemolysis detected by analyzer. Results may be affected.           ASSESSMENT:     No diagnosis found.        PLAN OF CARE:     Coronary artery disease  Extensive coronary artery calcification, LAD,  seen on CT chest.    I would recommend aspirin 81 mg daily and high intensity statin given extensive coronary artery calcifications.  Patient is uninterested at this time  Hyperlipidemia: .  We had a long discussion regarding statins today.  He has had some  success with reducing his LDL cholesterol with diet and exercise.  He is doing a great job and is lost 20 pounds.  Most recent LDL is 120.  I told him our goal is less than 70.  I would recommend statin therapy but again he declined.  He will follow-up with his primary care doctor Dr. Landis and reassess his cholesterol panel in a few months and reconsider  MICHAEL  First-degree AV block: stable. No signs of high grade block.   Borderline aortic root dilation: Aortic root is 4.0 cm on echocardiogram.    Clinic in 1 year          Discharge Medications            Accurate as of May 16, 2025  8:44 AM. If you have any questions, ask your nurse or doctor.                Continue These Medications        Instructions Start Date   albuterol sulfate  (90 Base) MCG/ACT inhaler  Commonly known as: PROVENTIL HFA;VENTOLIN HFA;PROAIR HFA   2 puffs, Every 6 Hours PRN      b complex-C-E-zinc tablet   1 tablet, Daily      B-12 PO   1 capsule, Daily      budesonide-formoterol 160-4.5 MCG/ACT inhaler  Commonly known as: SYMBICORT   2 puffs, As Needed      EPINEPHrine (Anaphylaxis) 1 MG/ML injection  Commonly known as: ADRENALIN   0.3 mg, As Needed      levothyroxine 50 MCG tablet  Commonly known as: SYNTHROID, LEVOTHROID   50 mcg, Oral, Daily      magnesium oxide 400 tablet tablet  Commonly known as: MAG-OX   1 tablet, Daily      oxybutynin XL 5 MG 24 hr tablet  Commonly known as: DITROPAN-XL   1 tablet, Daily      RED YEAST RICE PO   1 capsule, Daily      TURMERIC CURCUMIN PO   1 capsule, Daily               Thank you for allowing me to participate in the care of your patient,      Sincerely,   Adeel Almonte MD  [facility]  05/16/25  08:44 EDT

## 2025-06-11 ENCOUNTER — TRANSCRIBE ORDERS (OUTPATIENT)
Dept: ADMINISTRATIVE | Facility: HOSPITAL | Age: 70
End: 2025-06-11
Payer: COMMERCIAL

## 2025-06-11 DIAGNOSIS — R91.1 PULMONARY NODULE: Primary | ICD-10-CM

## 2025-07-25 ENCOUNTER — HOSPITAL ENCOUNTER (OUTPATIENT)
Facility: HOSPITAL | Age: 70
Discharge: HOME | End: 2025-07-25
Admitting: INTERNAL MEDICINE
Payer: COMMERCIAL

## 2025-07-25 DIAGNOSIS — R91.1 PULMONARY NODULE: ICD-10-CM

## 2025-07-25 LAB
ALBUMIN SERPL-MCNC: 4.3 G/DL (ref 3.5–5.2)
ALBUMIN/GLOB SERPL: 2 G/DL
ALP SERPL-CCNC: 27 U/L (ref 39–117)
ALT SERPL-CCNC: 21 U/L (ref 1–41)
AST SERPL-CCNC: 15 U/L (ref 1–40)
BILIRUB SERPL-MCNC: 0.4 MG/DL (ref 0–1.2)
BUN SERPL-MCNC: 19 MG/DL (ref 8–23)
BUN/CREAT SERPL: 20.9 (ref 7–25)
CALCIUM SERPL-MCNC: 9.9 MG/DL (ref 8.6–10.5)
CHLORIDE SERPL-SCNC: 100 MMOL/L (ref 98–107)
CHOLEST SERPL-MCNC: 198 MG/DL (ref 0–200)
CK SERPL-CCNC: 65 U/L (ref 20–200)
CO2 SERPL-SCNC: 23.7 MMOL/L (ref 22–29)
CREAT SERPL-MCNC: 0.91 MG/DL (ref 0.76–1.27)
EGFRCR SERPLBLD CKD-EPI 2021: 91.2 ML/MIN/1.73
GLOBULIN SER CALC-MCNC: 2.2 GM/DL
GLUCOSE SERPL-MCNC: 95 MG/DL (ref 65–99)
HDLC SERPL-MCNC: 75 MG/DL (ref 40–60)
LDLC SERPL CALC-MCNC: 109 MG/DL (ref 0–100)
LDLC/HDLC SERPL: 1.44 {RATIO}
POTASSIUM SERPL-SCNC: 4.6 MMOL/L (ref 3.5–5.2)
PROT SERPL-MCNC: 6.5 G/DL (ref 6–8.5)
SODIUM SERPL-SCNC: 137 MMOL/L (ref 136–145)
TRIGL SERPL-MCNC: 76 MG/DL (ref 0–150)
VLDLC SERPL CALC-MCNC: 14 MG/DL (ref 5–40)

## 2025-07-25 PROCEDURE — 71250 CT THORAX DX C-: CPT

## 2025-08-01 ENCOUNTER — OFFICE VISIT (OUTPATIENT)
Dept: INTERNAL MEDICINE | Facility: CLINIC | Age: 70
End: 2025-08-01
Payer: MEDICARE

## 2025-08-01 VITALS
SYSTOLIC BLOOD PRESSURE: 128 MMHG | BODY MASS INDEX: 26.95 KG/M2 | WEIGHT: 199 LBS | OXYGEN SATURATION: 96 % | HEART RATE: 75 BPM | HEIGHT: 72 IN | DIASTOLIC BLOOD PRESSURE: 82 MMHG

## 2025-08-01 DIAGNOSIS — E78.5 HYPERLIPIDEMIA, UNSPECIFIED HYPERLIPIDEMIA TYPE: ICD-10-CM

## 2025-08-01 DIAGNOSIS — C61 PROSTATE CANCER: ICD-10-CM

## 2025-08-01 DIAGNOSIS — J45.20 MILD INTERMITTENT REACTIVE AIRWAY DISEASE WITHOUT COMPLICATION: ICD-10-CM

## 2025-08-01 DIAGNOSIS — E03.9 HYPOTHYROIDISM, UNSPECIFIED TYPE: Primary | ICD-10-CM

## 2025-08-01 NOTE — PROGRESS NOTES
Chief Complaint   Patient presents with    Hypothyroidism    Hypertension    Hyperlipidemia       Hypothyroidism  His past medical history is significant for hyperlipidemia.   Hypertension  Hyperlipidemia  Exacerbating diseases include hypothyroidism.      Girish Greenberg is a 69 y.o. male presents for follow up evaluation. Patient has hypothyroid, htn, hyperlipidemia. Patient was advised statin therapy given elevated cardiac calcium score. He declined this and has made some consistent healthy lifestyle modifications. He has reduced ldl c from 181 to 109. Also taking red yeast rice.   Bp has been having normotensive bp.   Thyroid tested in January and this was euthyroid.   H/o prostate ca. Recently noted psa elevated.    The following portions of the patient's history were reviewed and updated as appropriate: allergies, current medications, past family history, past medical history, past social history, past surgical history and problem list.  Current Outpatient Medications on File Prior to Visit   Medication Sig Dispense Refill    albuterol (PROVENTIL HFA;VENTOLIN HFA) 108 (90 BASE) MCG/ACT inhaler Inhale 2 puffs Every 6 (Six) Hours As Needed for Shortness of Air. Indications: Chronic Obstructive Lung Disease      budesonide-formoterol (SYMBICORT) 160-4.5 MCG/ACT inhaler Inhale 2 puffs As Needed (SOA). Indications: Chronic Obstructive Lung Disease      Cyanocobalamin (B-12 PO) Take 1 capsule by mouth Daily. HOLDING FOR OR  Indications: Vitamin B Deficiency      EPINEPHrine, Anaphylaxis, (ADRENALIN) 1 MG/ML injection Inject 0.3 mL into the appropriate muscle as directed by prescriber As Needed for Anaphylaxis. Indications: Life-Threatening Hypersensitivity Reaction      levothyroxine (SYNTHROID, LEVOTHROID) 50 MCG tablet TAKE 1 TABLET BY MOUTH EVERY DAY 90 tablet 5    magnesium oxide (MAG-OX) 400 tablet tablet Take 1 tablet by mouth Daily. Indications: Disorder with Low Magnesium Levels      Multiple Vitamins-Minerals  "(b complex-C-E-zinc) tablet Take 1 tablet by mouth Daily. Indications: Other vitamin deficiencies      oxybutynin XL (DITROPAN-XL) 5 MG 24 hr tablet Take 1 tablet by mouth Daily.      Red Yeast Rice Extract (RED YEAST RICE PO) Take 1 capsule by mouth Daily. HOLD FOR SURGERY      TURMERIC CURCUMIN PO Take 1 capsule by mouth Daily. HOLD FOR SURGERY       No current facility-administered medications on file prior to visit.     Review of Systems    Objective   Physical Exam  Vitals and nursing note reviewed.   Constitutional:       Appearance: Normal appearance. He is well-developed.   HENT:      Head: Normocephalic and atraumatic.      Right Ear: Tympanic membrane and external ear normal.      Left Ear: Tympanic membrane and external ear normal.      Nose: Nose normal.      Mouth/Throat:      Mouth: Mucous membranes are moist.   Eyes:      Extraocular Movements: Extraocular movements intact.      Pupils: Pupils are equal, round, and reactive to light.   Cardiovascular:      Rate and Rhythm: Normal rate and regular rhythm.      Pulses: Normal pulses.      Heart sounds: Normal heart sounds.   Pulmonary:      Effort: Pulmonary effort is normal. No respiratory distress.      Breath sounds: Normal breath sounds.   Abdominal:      General: Abdomen is flat.      Palpations: Abdomen is soft.   Musculoskeletal:         General: Normal range of motion.      Cervical back: Normal range of motion and neck supple.   Skin:     General: Skin is warm and dry.   Neurological:      General: No focal deficit present.      Mental Status: He is alert and oriented to person, place, and time.   Psychiatric:         Mood and Affect: Mood normal.         Behavior: Behavior normal.         Thought Content: Thought content normal.         Judgment: Judgment normal.          /82   Pulse 75   Ht 182.9 cm (72\")   Wt 90.3 kg (199 lb)   SpO2 96%   BMI 26.99 kg/m²     Assessment & Plan   Diagnoses and all orders for this " visit:    Hypothyroidism, unspecified type  -     TSH    Hyperlipidemia, unspecified hyperlipidemia type    Prostate cancer    Mild intermittent reactive airway disease without complication      Patient w hypothyroidism. Will repeat TSH and continue sythroid replacement therapy.   Patient has h/o prostate ca. To have additional imaging and f/u w urology. He has hyperlipidemia. Declines statin therapy. Advised low dosing to get less than 70 on ldlc. Has reactive air. Discussed timing for symbicort v albuterol interms of long term v rescue inhaler.

## 2025-08-02 LAB
TSH SERPL DL<=0.005 MIU/L-ACNC: 2.27 UIU/ML (ref 0.27–4.2)
WRITTEN AUTHORIZATION: NORMAL

## (undated) DEVICE — 450 ML BOTTLE OF 0.05% CHLORHEXIDINE GLUCONATE IN 99.95% STERILE WATER FOR IRRIGATION, USP AND APPLICATOR.: Brand: IRRISEPT ANTIMICROBIAL WOUND LAVAGE

## (undated) DEVICE — INTENDED FOR TISSUE SEPARATION, AND OTHER PROCEDURES THAT REQUIRE A SHARP SURGICAL BLADE TO PUNCTURE OR CUT.: Brand: BARD-PARKER ® CARBON RIB-BACK BLADES

## (undated) DEVICE — ADAPT CLN BIOGUARD AIR/H2O DISP

## (undated) DEVICE — CANN O2 ETCO2 FITS ALL CONN CO2 SMPL A/ 7IN DISP LF

## (undated) DEVICE — KT ORCA ORCAPOD DISP STRL

## (undated) DEVICE — ADHS SKIN SURG TISS VISC PREMIERPRO EXOFIN HI/VISC FAST/DRY

## (undated) DEVICE — SPNG GZ WOVN 4X4IN 12PLY 10/BX STRL

## (undated) DEVICE — BW-412T DISP COMBO CLEANING BRUSH: Brand: SINGLE USE COMBINATION CLEANING BRUSH

## (undated) DEVICE — APPL CHLORAPREP HI/LITE 26ML ORNG

## (undated) DEVICE — LOU MINOR PROCEDURE: Brand: MEDLINE INDUSTRIES, INC.

## (undated) DEVICE — 3M™ STERI-STRIP™ COMPOUND BENZOIN TINCTURE 40 BAGS/CARTON 4 CARTONS/CASE C1544: Brand: 3M™ STERI-STRIP™

## (undated) DEVICE — TRAP FLD MINIVAC MEGADYNE 100ML

## (undated) DEVICE — SOL IRR H2O BO 1000ML STRL

## (undated) DEVICE — DRP C/ARM 41X74IN

## (undated) DEVICE — NDL SPINE 22G 31/2IN BLK

## (undated) DEVICE — EPIDURAL TRAY: Brand: MEDLINE INDUSTRIES, INC.

## (undated) DEVICE — SPK PIN NSR FLUID NONVNT 2WY MINI LL LF

## (undated) DEVICE — ANTIBACTERIAL UNDYED BRAIDED (POLYGLACTIN 910), SYNTHETIC ABSORBABLE SUTURE: Brand: COATED VICRYL

## (undated) DEVICE — DRAPE,REIN 53X77,STERILE: Brand: MEDLINE

## (undated) DEVICE — SINGLE-USE BIOPSY FORCEPS: Brand: RADIAL JAW 4

## (undated) DEVICE — TUBING, SUCTION, 1/4" X 10', STRAIGHT: Brand: MEDLINE

## (undated) DEVICE — TBG PENCL TELESCP MEGADYNE SMOKE EVAC 10FT

## (undated) DEVICE — DRSNG SURESITE WNDW 4X4.5

## (undated) DEVICE — NDL SPINE 22G 5IN BLK

## (undated) DEVICE — CVR PROB 96IN LF STRL

## (undated) DEVICE — LINER SURG CANSTR SXN S/RIGD 1500CC

## (undated) DEVICE — Device

## (undated) DEVICE — GLV SURG TRIUMPH PF LTX 7.5 STRL

## (undated) DEVICE — SENSR O2 OXIMAX FNGR A/ 18IN NONSTR

## (undated) DEVICE — CABL SACRAL NEUROSTM INTERSTIM 218CM

## (undated) DEVICE — CANN NASL O2 INF

## (undated) DEVICE — NDL HYPO PRECISIONGLIDE REG 25G 1 1/2

## (undated) DEVICE — LN SMPL CO2 SHTRM SD STREAM W/M LUER

## (undated) DEVICE — DRSNG SURESITE123 6X8IN

## (undated) DEVICE — GLV SURG BIOGEL LTX PF 8